# Patient Record
Sex: FEMALE | Race: WHITE | Employment: FULL TIME | URBAN - METROPOLITAN AREA
[De-identification: names, ages, dates, MRNs, and addresses within clinical notes are randomized per-mention and may not be internally consistent; named-entity substitution may affect disease eponyms.]

---

## 2017-01-01 ENCOUNTER — APPOINTMENT (OUTPATIENT)
Dept: CT IMAGING | Age: 57
DRG: 641 | End: 2017-01-01
Attending: HOSPITALIST
Payer: COMMERCIAL

## 2017-01-01 ENCOUNTER — APPOINTMENT (OUTPATIENT)
Dept: CT IMAGING | Age: 57
DRG: 641 | End: 2017-01-01
Attending: INTERNAL MEDICINE
Payer: COMMERCIAL

## 2017-01-01 ENCOUNTER — HOSPITAL ENCOUNTER (INPATIENT)
Age: 57
LOS: 2 days | DRG: 189 | End: 2017-10-08
Attending: EMERGENCY MEDICINE | Admitting: INTERNAL MEDICINE
Payer: COMMERCIAL

## 2017-01-01 ENCOUNTER — HOSPITAL ENCOUNTER (EMERGENCY)
Age: 57
Discharge: HOME | End: 2017-09-10
Attending: EMERGENCY MEDICINE
Payer: COMMERCIAL

## 2017-01-01 ENCOUNTER — APPOINTMENT (OUTPATIENT)
Dept: GENERAL RADIOLOGY | Age: 57
End: 2017-01-01
Attending: EMERGENCY MEDICINE
Payer: COMMERCIAL

## 2017-01-01 ENCOUNTER — APPOINTMENT (OUTPATIENT)
Dept: ULTRASOUND IMAGING | Age: 57
DRG: 189 | End: 2017-01-01
Attending: EMERGENCY MEDICINE
Payer: COMMERCIAL

## 2017-01-01 ENCOUNTER — APPOINTMENT (OUTPATIENT)
Dept: ULTRASOUND IMAGING | Age: 57
DRG: 641 | End: 2017-01-01
Attending: HOSPITALIST
Payer: COMMERCIAL

## 2017-01-01 ENCOUNTER — HOSPITAL ENCOUNTER (INPATIENT)
Age: 57
LOS: 3 days | Discharge: HOME | DRG: 643 | End: 2017-09-18
Attending: EMERGENCY MEDICINE | Admitting: INTERNAL MEDICINE
Payer: COMMERCIAL

## 2017-01-01 ENCOUNTER — HOSPITAL ENCOUNTER (INPATIENT)
Age: 57
LOS: 4 days | Discharge: HOME | DRG: 641 | End: 2017-10-02
Attending: EMERGENCY MEDICINE | Admitting: HOSPITALIST
Payer: COMMERCIAL

## 2017-01-01 ENCOUNTER — APPOINTMENT (OUTPATIENT)
Dept: ULTRASOUND IMAGING | Age: 57
DRG: 641 | End: 2017-01-01
Attending: INTERNAL MEDICINE
Payer: COMMERCIAL

## 2017-01-01 ENCOUNTER — DOCUMENTATION ONLY (OUTPATIENT)
Dept: ONCOLOGY | Age: 57
End: 2017-01-01

## 2017-01-01 ENCOUNTER — APPOINTMENT (OUTPATIENT)
Dept: GENERAL RADIOLOGY | Age: 57
DRG: 189 | End: 2017-01-01
Attending: HOSPITALIST
Payer: COMMERCIAL

## 2017-01-01 ENCOUNTER — APPOINTMENT (OUTPATIENT)
Dept: GENERAL RADIOLOGY | Age: 57
DRG: 643 | End: 2017-01-01
Attending: EMERGENCY MEDICINE
Payer: COMMERCIAL

## 2017-01-01 ENCOUNTER — APPOINTMENT (OUTPATIENT)
Dept: GENERAL RADIOLOGY | Age: 57
DRG: 189 | End: 2017-01-01
Attending: EMERGENCY MEDICINE
Payer: COMMERCIAL

## 2017-01-01 VITALS
HEART RATE: 95 BPM | RESPIRATION RATE: 18 BRPM | BODY MASS INDEX: 32.97 KG/M2 | DIASTOLIC BLOOD PRESSURE: 81 MMHG | HEIGHT: 63 IN | OXYGEN SATURATION: 95 % | TEMPERATURE: 98.2 F | WEIGHT: 186.07 LBS | SYSTOLIC BLOOD PRESSURE: 172 MMHG

## 2017-01-01 VITALS
OXYGEN SATURATION: 97 % | SYSTOLIC BLOOD PRESSURE: 163 MMHG | TEMPERATURE: 98.3 F | WEIGHT: 184.97 LBS | DIASTOLIC BLOOD PRESSURE: 79 MMHG | HEIGHT: 63 IN | BODY MASS INDEX: 32.77 KG/M2 | HEART RATE: 80 BPM | RESPIRATION RATE: 20 BRPM

## 2017-01-01 VITALS
BODY MASS INDEX: 32.6 KG/M2 | WEIGHT: 184 LBS | RESPIRATION RATE: 16 BRPM | HEIGHT: 63 IN | SYSTOLIC BLOOD PRESSURE: 158 MMHG | HEART RATE: 80 BPM | TEMPERATURE: 98.5 F | OXYGEN SATURATION: 92 % | DIASTOLIC BLOOD PRESSURE: 77 MMHG

## 2017-01-01 VITALS
TEMPERATURE: 95.8 F | HEIGHT: 63 IN | SYSTOLIC BLOOD PRESSURE: 57 MMHG | OXYGEN SATURATION: 95 % | WEIGHT: 187.61 LBS | DIASTOLIC BLOOD PRESSURE: 17 MMHG | BODY MASS INDEX: 33.24 KG/M2

## 2017-01-01 DIAGNOSIS — C78.7 LIVER METASTASES (HCC): Primary | ICD-10-CM

## 2017-01-01 DIAGNOSIS — R25.2 CRAMP OF BOTH LOWER EXTREMITIES: ICD-10-CM

## 2017-01-01 DIAGNOSIS — K72.00 ACUTE LIVER FAILURE WITHOUT HEPATIC COMA: ICD-10-CM

## 2017-01-01 DIAGNOSIS — E87.6 HYPOKALEMIA: ICD-10-CM

## 2017-01-01 DIAGNOSIS — C34.91 SMALL CELL CARCINOMA OF RIGHT LUNG (HCC): ICD-10-CM

## 2017-01-01 DIAGNOSIS — E87.6 HYPOKALEMIA: Primary | ICD-10-CM

## 2017-01-01 DIAGNOSIS — K59.00 CONSTIPATION, UNSPECIFIED CONSTIPATION TYPE: ICD-10-CM

## 2017-01-01 DIAGNOSIS — J44.1 ACUTE EXACERBATION OF CHRONIC OBSTRUCTIVE PULMONARY DISEASE (COPD) (HCC): Primary | ICD-10-CM

## 2017-01-01 DIAGNOSIS — R18.0 MALIGNANT ASCITES: ICD-10-CM

## 2017-01-01 DIAGNOSIS — R60.0 PEDAL EDEMA: ICD-10-CM

## 2017-01-01 DIAGNOSIS — C79.9 METASTATIC CANCER (HCC): ICD-10-CM

## 2017-01-01 DIAGNOSIS — E87.1 HYPONATREMIA: Primary | ICD-10-CM

## 2017-01-01 DIAGNOSIS — E87.1 HYPONATREMIA: ICD-10-CM

## 2017-01-01 DIAGNOSIS — J96.21 ACUTE ON CHRONIC RESPIRATORY FAILURE WITH HYPOXIA (HCC): ICD-10-CM

## 2017-01-01 LAB
ACTH PLAS-MCNC: 337.6 PG/ML (ref 7.2–63.3)
AFP-TM SERPL-MCNC: 2.9 NG/ML (ref 0–8.3)
ALBUMIN SERPL-MCNC: 1.8 G/DL (ref 3.5–5)
ALBUMIN SERPL-MCNC: 2.1 G/DL (ref 3.5–5)
ALBUMIN SERPL-MCNC: 2.3 G/DL (ref 3.5–5)
ALBUMIN SERPL-MCNC: 2.4 G/DL (ref 3.5–5)
ALBUMIN SERPL-MCNC: 2.8 G/DL (ref 3.5–5)
ALBUMIN SERPL-MCNC: 2.9 G/DL (ref 3.5–5)
ALBUMIN SERPL-MCNC: 3 G/DL (ref 3.5–5)
ALBUMIN SERPL-MCNC: 3.2 G/DL (ref 3.5–5)
ALBUMIN/GLOB SERPL: 0.5 {RATIO} (ref 1.1–2.2)
ALBUMIN/GLOB SERPL: 0.6 {RATIO} (ref 1.1–2.2)
ALBUMIN/GLOB SERPL: 0.7 {RATIO} (ref 1.1–2.2)
ALBUMIN/GLOB SERPL: 0.7 {RATIO} (ref 1.1–2.2)
ALBUMIN/GLOB SERPL: 1 {RATIO} (ref 1.1–2.2)
ALBUMIN/GLOB SERPL: 1 {RATIO} (ref 1.1–2.2)
ALBUMIN/GLOB SERPL: 1.2 {RATIO} (ref 1.1–2.2)
ALBUMIN/GLOB SERPL: 1.2 {RATIO} (ref 1.1–2.2)
ALDOST 24H UR-MRATE: <10.25 UG/24 HR (ref 0–19)
ALDOST SERPL-MCNC: <1 NG/DL (ref 0–30)
ALDOST UR-MCNC: <2.5 UG/L
ALP SERPL-CCNC: 1292 U/L (ref 45–117)
ALP SERPL-CCNC: 248 U/L (ref 45–117)
ALP SERPL-CCNC: 424 U/L (ref 45–117)
ALP SERPL-CCNC: 437 U/L (ref 45–117)
ALP SERPL-CCNC: 88 U/L (ref 45–117)
ALP SERPL-CCNC: 88 U/L (ref 45–117)
ALP SERPL-CCNC: 98 U/L (ref 45–117)
ALP SERPL-CCNC: 98 U/L (ref 45–117)
ALT SERPL-CCNC: 278 U/L (ref 12–78)
ALT SERPL-CCNC: 292 U/L (ref 12–78)
ALT SERPL-CCNC: 37 U/L (ref 12–78)
ALT SERPL-CCNC: 38 U/L (ref 12–78)
ALT SERPL-CCNC: 52 U/L (ref 12–78)
ALT SERPL-CCNC: 55 U/L (ref 12–78)
ALT SERPL-CCNC: 87 U/L (ref 12–78)
ALT SERPL-CCNC: >3500 U/L (ref 12–78)
AMORPH CRY URNS QL MICRO: ABNORMAL
AMPHET UR QL SCN: NEGATIVE
ANION GAP SERPL CALC-SCNC: 10 MMOL/L (ref 5–15)
ANION GAP SERPL CALC-SCNC: 14 MMOL/L (ref 5–15)
ANION GAP SERPL CALC-SCNC: 23 MMOL/L (ref 5–15)
ANION GAP SERPL CALC-SCNC: 4 MMOL/L (ref 5–15)
ANION GAP SERPL CALC-SCNC: 5 MMOL/L (ref 5–15)
ANION GAP SERPL CALC-SCNC: 6 MMOL/L (ref 5–15)
ANION GAP SERPL CALC-SCNC: 7 MMOL/L (ref 5–15)
ANION GAP SERPL CALC-SCNC: 8 MMOL/L (ref 5–15)
ANION GAP SERPL CALC-SCNC: 9 MMOL/L (ref 5–15)
APPEARANCE UR: ABNORMAL
APPEARANCE UR: CLEAR
APPEARANCE UR: CLEAR
APTT PPP: 23.3 SEC (ref 22.1–32.5)
ARTERIAL PATENCY WRIST A: YES
AST SERPL-CCNC: 175 U/L (ref 15–37)
AST SERPL-CCNC: 182 U/L (ref 15–37)
AST SERPL-CCNC: 195 U/L (ref 15–37)
AST SERPL-CCNC: 28 U/L (ref 15–37)
AST SERPL-CCNC: 31 U/L (ref 15–37)
AST SERPL-CCNC: 37 U/L (ref 15–37)
AST SERPL-CCNC: 46 U/L (ref 15–37)
AST SERPL-CCNC: >2000 U/L (ref 15–37)
ATRIAL RATE: 163 BPM
ATRIAL RATE: 62 BPM
ATRIAL RATE: 88 BPM
BACTERIA SPEC CULT: NORMAL
BACTERIA SPEC CULT: NORMAL
BACTERIA URNS QL MICRO: ABNORMAL /HPF
BACTERIA URNS QL MICRO: NEGATIVE /HPF
BACTERIA URNS QL MICRO: NEGATIVE /HPF
BARBITURATES UR QL SCN: NEGATIVE
BASE DEFICIT BLDA-SCNC: 19.6 MMOL/L
BASE DEFICIT BLDA-SCNC: 2 MMOL/L
BASE EXCESS BLDA CALC-SCNC: 19.9 MMOL/L
BASOPHILS # BLD: 0 K/UL
BASOPHILS # BLD: 0 K/UL (ref 0–0.1)
BASOPHILS NFR BLD: 0 %
BASOPHILS NFR BLD: 0 % (ref 0–1)
BDY SITE: ABNORMAL
BENZODIAZ UR QL: NEGATIVE
BILIRUB DIRECT SERPL-MCNC: 2.4 MG/DL (ref 0–0.2)
BILIRUB SERPL-MCNC: 0.6 MG/DL (ref 0.2–1)
BILIRUB SERPL-MCNC: 0.9 MG/DL (ref 0.2–1)
BILIRUB SERPL-MCNC: 1 MG/DL (ref 0.2–1)
BILIRUB SERPL-MCNC: 3.2 MG/DL (ref 0.2–1)
BILIRUB SERPL-MCNC: 3.5 MG/DL (ref 0.2–1)
BILIRUB SERPL-MCNC: 4.3 MG/DL (ref 0.2–1)
BILIRUB UR QL: NEGATIVE
BNP SERPL-MCNC: 1014 PG/ML (ref 0–125)
BREATHS.SPONTANEOUS ON VENT: 20
BREATHS.SPONTANEOUS ON VENT: 28
BUN SERPL-MCNC: 10 MG/DL (ref 6–20)
BUN SERPL-MCNC: 11 MG/DL (ref 6–20)
BUN SERPL-MCNC: 12 MG/DL (ref 6–20)
BUN SERPL-MCNC: 12 MG/DL (ref 6–20)
BUN SERPL-MCNC: 14 MG/DL (ref 6–20)
BUN SERPL-MCNC: 18 MG/DL (ref 6–20)
BUN SERPL-MCNC: 20 MG/DL (ref 6–20)
BUN SERPL-MCNC: 36 MG/DL (ref 6–20)
BUN SERPL-MCNC: 7 MG/DL (ref 6–20)
BUN SERPL-MCNC: 8 MG/DL (ref 6–20)
BUN SERPL-MCNC: 8 MG/DL (ref 6–20)
BUN SERPL-MCNC: 9 MG/DL (ref 6–20)
BUN/CREAT SERPL: 13 (ref 12–20)
BUN/CREAT SERPL: 15 (ref 12–20)
BUN/CREAT SERPL: 15 (ref 12–20)
BUN/CREAT SERPL: 17 (ref 12–20)
BUN/CREAT SERPL: 18 (ref 12–20)
BUN/CREAT SERPL: 18 (ref 12–20)
BUN/CREAT SERPL: 19 (ref 12–20)
BUN/CREAT SERPL: 20 (ref 12–20)
BUN/CREAT SERPL: 21 (ref 12–20)
BUN/CREAT SERPL: 22 (ref 12–20)
BUN/CREAT SERPL: 23 (ref 12–20)
BUN/CREAT SERPL: 24 (ref 12–20)
BUN/CREAT SERPL: 26 (ref 12–20)
BUN/CREAT SERPL: 29 (ref 12–20)
CALCIUM SERPL-MCNC: 7.7 MG/DL (ref 8.5–10.1)
CALCIUM SERPL-MCNC: 7.7 MG/DL (ref 8.5–10.1)
CALCIUM SERPL-MCNC: 7.8 MG/DL (ref 8.5–10.1)
CALCIUM SERPL-MCNC: 7.9 MG/DL (ref 8.5–10.1)
CALCIUM SERPL-MCNC: 8 MG/DL (ref 8.5–10.1)
CALCIUM SERPL-MCNC: 8.1 MG/DL (ref 8.5–10.1)
CALCIUM SERPL-MCNC: 8.1 MG/DL (ref 8.5–10.1)
CALCIUM SERPL-MCNC: 8.2 MG/DL (ref 8.5–10.1)
CALCIUM SERPL-MCNC: 8.3 MG/DL (ref 8.5–10.1)
CALCIUM SERPL-MCNC: 8.8 MG/DL (ref 8.5–10.1)
CALCIUM SERPL-MCNC: 8.8 MG/DL (ref 8.5–10.1)
CALCIUM SERPL-MCNC: 8.9 MG/DL (ref 8.5–10.1)
CALCULATED P AXIS, ECG09: -52 DEGREES
CALCULATED P AXIS, ECG09: 69 DEGREES
CALCULATED R AXIS, ECG10: -15 DEGREES
CALCULATED R AXIS, ECG10: -74 DEGREES
CALCULATED R AXIS, ECG10: 5 DEGREES
CALCULATED T AXIS, ECG11: -131 DEGREES
CALCULATED T AXIS, ECG11: 43 DEGREES
CALCULATED T AXIS, ECG11: 68 DEGREES
CANCER AG19-9 SERPL-ACNC: 117 U/ML (ref 0–35)
CANNABINOIDS UR QL SCN: NEGATIVE
CC UR VC: NORMAL
CC UR VC: NORMAL
CEA SERPL-MCNC: 2.1 NG/ML
CHLORIDE SERPL-SCNC: 100 MMOL/L (ref 97–108)
CHLORIDE SERPL-SCNC: 101 MMOL/L (ref 97–108)
CHLORIDE SERPL-SCNC: 102 MMOL/L (ref 97–108)
CHLORIDE SERPL-SCNC: 77 MMOL/L (ref 97–108)
CHLORIDE SERPL-SCNC: 77 MMOL/L (ref 97–108)
CHLORIDE SERPL-SCNC: 78 MMOL/L (ref 97–108)
CHLORIDE SERPL-SCNC: 81 MMOL/L (ref 97–108)
CHLORIDE SERPL-SCNC: 83 MMOL/L (ref 97–108)
CHLORIDE SERPL-SCNC: 87 MMOL/L (ref 97–108)
CHLORIDE SERPL-SCNC: 89 MMOL/L (ref 97–108)
CHLORIDE SERPL-SCNC: 92 MMOL/L (ref 97–108)
CHLORIDE SERPL-SCNC: 93 MMOL/L (ref 97–108)
CHLORIDE SERPL-SCNC: 94 MMOL/L (ref 97–108)
CHLORIDE SERPL-SCNC: 95 MMOL/L (ref 97–108)
CHLORIDE SERPL-SCNC: 95 MMOL/L (ref 97–108)
CHLORIDE SERPL-SCNC: 96 MMOL/L (ref 97–108)
CHLORIDE SERPL-SCNC: 97 MMOL/L (ref 97–108)
CHLORIDE SERPL-SCNC: 99 MMOL/L (ref 97–108)
CK MB CFR SERPL CALC: 0.4 % (ref 0–2.5)
CK MB CFR SERPL CALC: 0.7 % (ref 0–2.5)
CK MB SERPL-MCNC: 17.3 NG/ML (ref 5–25)
CK MB SERPL-MCNC: 2.2 NG/ML (ref 5–25)
CK SERPL-CCNC: 294 U/L (ref 26–192)
CK SERPL-CCNC: 322 U/L (ref 26–192)
CK SERPL-CCNC: 4042 U/L (ref 26–192)
CO2 SERPL-SCNC: 11 MMOL/L (ref 21–32)
CO2 SERPL-SCNC: 23 MMOL/L (ref 21–32)
CO2 SERPL-SCNC: 28 MMOL/L (ref 21–32)
CO2 SERPL-SCNC: 28 MMOL/L (ref 21–32)
CO2 SERPL-SCNC: 29 MMOL/L (ref 21–32)
CO2 SERPL-SCNC: 31 MMOL/L (ref 21–32)
CO2 SERPL-SCNC: 33 MMOL/L (ref 21–32)
CO2 SERPL-SCNC: 34 MMOL/L (ref 21–32)
CO2 SERPL-SCNC: 34 MMOL/L (ref 21–32)
CO2 SERPL-SCNC: 35 MMOL/L (ref 21–32)
CO2 SERPL-SCNC: 36 MMOL/L (ref 21–32)
CO2 SERPL-SCNC: 36 MMOL/L (ref 21–32)
CO2 SERPL-SCNC: 37 MMOL/L (ref 21–32)
CO2 SERPL-SCNC: 37 MMOL/L (ref 21–32)
CO2 SERPL-SCNC: 38 MMOL/L (ref 21–32)
CO2 SERPL-SCNC: 40 MMOL/L (ref 21–32)
CO2 SERPL-SCNC: 43 MMOL/L (ref 21–32)
CO2 SERPL-SCNC: 45 MMOL/L (ref 21–32)
COCAINE UR QL SCN: NEGATIVE
COLLECT DURATION TIME UR: 24 HR
COLOR UR: ABNORMAL
CORTIS AM PEAK SERPL-MCNC: 104.6 UG/DL (ref 4.3–22.4)
CREAT SERPL-MCNC: 0.33 MG/DL (ref 0.55–1.02)
CREAT SERPL-MCNC: 0.38 MG/DL (ref 0.55–1.02)
CREAT SERPL-MCNC: 0.41 MG/DL (ref 0.55–1.02)
CREAT SERPL-MCNC: 0.43 MG/DL (ref 0.55–1.02)
CREAT SERPL-MCNC: 0.43 MG/DL (ref 0.55–1.02)
CREAT SERPL-MCNC: 0.45 MG/DL (ref 0.55–1.02)
CREAT SERPL-MCNC: 0.45 MG/DL (ref 0.55–1.02)
CREAT SERPL-MCNC: 0.51 MG/DL (ref 0.55–1.02)
CREAT SERPL-MCNC: 0.51 MG/DL (ref 0.55–1.02)
CREAT SERPL-MCNC: 0.52 MG/DL (ref 0.55–1.02)
CREAT SERPL-MCNC: 0.52 MG/DL (ref 0.55–1.02)
CREAT SERPL-MCNC: 0.54 MG/DL (ref 0.55–1.02)
CREAT SERPL-MCNC: 0.61 MG/DL (ref 0.55–1.02)
CREAT SERPL-MCNC: 0.69 MG/DL (ref 0.55–1.02)
CREAT SERPL-MCNC: 0.72 MG/DL (ref 0.55–1.02)
CREAT SERPL-MCNC: 0.88 MG/DL (ref 0.55–1.02)
CREAT SERPL-MCNC: 0.96 MG/DL (ref 0.55–1.02)
CREAT SERPL-MCNC: 2.42 MG/DL (ref 0.55–1.02)
DIAGNOSIS, 93000: NORMAL
DIFFERENTIAL METHOD BLD: ABNORMAL
DRUG SCRN COMMENT,DRGCM: NORMAL
EOSINOPHIL # BLD: 0 K/UL
EOSINOPHIL # BLD: 0 K/UL (ref 0–0.4)
EOSINOPHIL NFR BLD: 0 %
EOSINOPHIL NFR BLD: 0 % (ref 0–7)
EPAP/CPAP/PEEP, PAPEEP: 6
EPITH CASTS URNS QL MICRO: ABNORMAL /LPF
ERYTHROCYTE [DISTWIDTH] IN BLOOD BY AUTOMATED COUNT: 12.7 % (ref 11.5–14.5)
ERYTHROCYTE [DISTWIDTH] IN BLOOD BY AUTOMATED COUNT: 12.8 % (ref 11.5–14.5)
ERYTHROCYTE [DISTWIDTH] IN BLOOD BY AUTOMATED COUNT: 13.1 % (ref 11.5–14.5)
ERYTHROCYTE [DISTWIDTH] IN BLOOD BY AUTOMATED COUNT: 13.3 % (ref 11.5–14.5)
ERYTHROCYTE [DISTWIDTH] IN BLOOD BY AUTOMATED COUNT: 13.4 % (ref 11.5–14.5)
ERYTHROCYTE [DISTWIDTH] IN BLOOD BY AUTOMATED COUNT: 13.6 % (ref 11.5–14.5)
ERYTHROCYTE [DISTWIDTH] IN BLOOD BY AUTOMATED COUNT: 13.7 % (ref 11.5–14.5)
ERYTHROCYTE [DISTWIDTH] IN BLOOD BY AUTOMATED COUNT: 14.6 % (ref 11.5–14.5)
ERYTHROCYTE [DISTWIDTH] IN BLOOD BY AUTOMATED COUNT: 14.6 % (ref 11.5–14.5)
ERYTHROCYTE [DISTWIDTH] IN BLOOD BY AUTOMATED COUNT: 15.1 % (ref 11.5–14.5)
EST. AVERAGE GLUCOSE BLD GHB EST-MCNC: 143 MG/DL
FIO2 ON VENT: 100 %
FIO2 ON VENT: 100 %
GAS FLOW.O2 SETTING OXYMISER: 4 L/MIN
GLOBULIN SER CALC-MCNC: 2.6 G/DL (ref 2–4)
GLOBULIN SER CALC-MCNC: 2.7 G/DL (ref 2–4)
GLOBULIN SER CALC-MCNC: 2.8 G/DL (ref 2–4)
GLOBULIN SER CALC-MCNC: 2.9 G/DL (ref 2–4)
GLOBULIN SER CALC-MCNC: 3.1 G/DL (ref 2–4)
GLOBULIN SER CALC-MCNC: 3.3 G/DL (ref 2–4)
GLOBULIN SER CALC-MCNC: 3.4 G/DL (ref 2–4)
GLOBULIN SER CALC-MCNC: 3.5 G/DL (ref 2–4)
GLUCOSE BLD STRIP.AUTO-MCNC: 109 MG/DL (ref 65–100)
GLUCOSE BLD STRIP.AUTO-MCNC: 118 MG/DL (ref 65–100)
GLUCOSE BLD STRIP.AUTO-MCNC: 131 MG/DL (ref 65–100)
GLUCOSE BLD STRIP.AUTO-MCNC: 139 MG/DL (ref 65–100)
GLUCOSE BLD STRIP.AUTO-MCNC: 157 MG/DL (ref 65–100)
GLUCOSE BLD STRIP.AUTO-MCNC: 161 MG/DL (ref 65–100)
GLUCOSE BLD STRIP.AUTO-MCNC: 166 MG/DL (ref 65–100)
GLUCOSE BLD STRIP.AUTO-MCNC: 167 MG/DL (ref 65–100)
GLUCOSE BLD STRIP.AUTO-MCNC: 168 MG/DL (ref 65–100)
GLUCOSE BLD STRIP.AUTO-MCNC: 174 MG/DL (ref 65–100)
GLUCOSE BLD STRIP.AUTO-MCNC: 176 MG/DL (ref 65–100)
GLUCOSE BLD STRIP.AUTO-MCNC: 181 MG/DL (ref 65–100)
GLUCOSE BLD STRIP.AUTO-MCNC: 197 MG/DL (ref 65–100)
GLUCOSE BLD STRIP.AUTO-MCNC: 198 MG/DL (ref 65–100)
GLUCOSE BLD STRIP.AUTO-MCNC: 203 MG/DL (ref 65–100)
GLUCOSE BLD STRIP.AUTO-MCNC: 215 MG/DL (ref 65–100)
GLUCOSE BLD STRIP.AUTO-MCNC: 228 MG/DL (ref 65–100)
GLUCOSE BLD STRIP.AUTO-MCNC: 229 MG/DL (ref 65–100)
GLUCOSE BLD STRIP.AUTO-MCNC: 237 MG/DL (ref 65–100)
GLUCOSE BLD STRIP.AUTO-MCNC: 250 MG/DL (ref 65–100)
GLUCOSE BLD STRIP.AUTO-MCNC: 252 MG/DL (ref 65–100)
GLUCOSE BLD STRIP.AUTO-MCNC: 268 MG/DL (ref 65–100)
GLUCOSE BLD STRIP.AUTO-MCNC: 306 MG/DL (ref 65–100)
GLUCOSE BLD STRIP.AUTO-MCNC: 316 MG/DL (ref 65–100)
GLUCOSE BLD STRIP.AUTO-MCNC: 85 MG/DL (ref 65–100)
GLUCOSE BLD STRIP.AUTO-MCNC: 91 MG/DL (ref 65–100)
GLUCOSE BLD STRIP.AUTO-MCNC: 92 MG/DL (ref 65–100)
GLUCOSE BLD STRIP.AUTO-MCNC: 92 MG/DL (ref 65–100)
GLUCOSE BLD STRIP.AUTO-MCNC: 94 MG/DL (ref 65–100)
GLUCOSE BLD STRIP.AUTO-MCNC: 97 MG/DL (ref 65–100)
GLUCOSE SERPL-MCNC: 112 MG/DL (ref 65–100)
GLUCOSE SERPL-MCNC: 133 MG/DL (ref 65–100)
GLUCOSE SERPL-MCNC: 141 MG/DL (ref 65–100)
GLUCOSE SERPL-MCNC: 150 MG/DL (ref 65–100)
GLUCOSE SERPL-MCNC: 165 MG/DL (ref 65–100)
GLUCOSE SERPL-MCNC: 178 MG/DL (ref 65–100)
GLUCOSE SERPL-MCNC: 182 MG/DL (ref 65–100)
GLUCOSE SERPL-MCNC: 198 MG/DL (ref 65–100)
GLUCOSE SERPL-MCNC: 198 MG/DL (ref 65–100)
GLUCOSE SERPL-MCNC: 211 MG/DL (ref 65–100)
GLUCOSE SERPL-MCNC: 223 MG/DL (ref 65–100)
GLUCOSE SERPL-MCNC: 224 MG/DL (ref 65–100)
GLUCOSE SERPL-MCNC: 339 MG/DL (ref 65–100)
GLUCOSE SERPL-MCNC: 342 MG/DL (ref 65–100)
GLUCOSE SERPL-MCNC: 75 MG/DL (ref 65–100)
GLUCOSE SERPL-MCNC: 86 MG/DL (ref 65–100)
GLUCOSE UR STRIP.AUTO-MCNC: 500 MG/DL
HBA1C MFR BLD: 6.6 % (ref 4.2–6.3)
HCO3 BLDA-SCNC: 11 MMOL/L (ref 22–26)
HCO3 BLDA-SCNC: 19 MMOL/L (ref 22–26)
HCO3 BLDA-SCNC: 43 MMOL/L (ref 22–26)
HCT VFR BLD AUTO: 29.3 % (ref 35–47)
HCT VFR BLD AUTO: 31.3 % (ref 35–47)
HCT VFR BLD AUTO: 32.3 % (ref 35–47)
HCT VFR BLD AUTO: 33.5 % (ref 35–47)
HCT VFR BLD AUTO: 33.8 % (ref 35–47)
HCT VFR BLD AUTO: 33.9 % (ref 35–47)
HCT VFR BLD AUTO: 35.6 % (ref 35–47)
HCT VFR BLD AUTO: 36.2 % (ref 35–47)
HCT VFR BLD AUTO: 38.2 % (ref 35–47)
HCT VFR BLD AUTO: 38.5 % (ref 35–47)
HGB BLD-MCNC: 10.8 G/DL (ref 11.5–16)
HGB BLD-MCNC: 11.2 G/DL (ref 11.5–16)
HGB BLD-MCNC: 12.1 G/DL (ref 11.5–16)
HGB BLD-MCNC: 12.1 G/DL (ref 11.5–16)
HGB BLD-MCNC: 12.2 G/DL (ref 11.5–16)
HGB BLD-MCNC: 12.7 G/DL (ref 11.5–16)
HGB BLD-MCNC: 13.2 G/DL (ref 11.5–16)
HGB BLD-MCNC: 13.7 G/DL (ref 11.5–16)
HGB BLD-MCNC: 14 G/DL (ref 11.5–16)
HGB BLD-MCNC: 9.4 G/DL (ref 11.5–16)
HGB UR QL STRIP: NEGATIVE
HYALINE CASTS URNS QL MICRO: ABNORMAL /LPF (ref 0–5)
INR PPP: 1.2 (ref 0.9–1.1)
INR PPP: 1.2 (ref 0.9–1.1)
IPAP/PIP, IPAPIP: 8
KETONES UR QL STRIP.AUTO: NEGATIVE MG/DL
LACTATE SERPL-SCNC: 16.7 MMOL/L (ref 0.4–2)
LEUKOCYTE ESTERASE UR QL STRIP.AUTO: ABNORMAL
LEUKOCYTE ESTERASE UR QL STRIP.AUTO: NEGATIVE
LEUKOCYTE ESTERASE UR QL STRIP.AUTO: NEGATIVE
LIPASE SERPL-CCNC: 1392 U/L (ref 73–393)
LIPASE SERPL-CCNC: 397 U/L (ref 73–393)
LIPASE SERPL-CCNC: 461 U/L (ref 73–393)
LIPASE SERPL-CCNC: 778 U/L (ref 73–393)
LYMPHOCYTES # BLD: 1 K/UL (ref 0.8–3.5)
LYMPHOCYTES # BLD: 2.1 K/UL
LYMPHOCYTES # BLD: 2.5 K/UL
LYMPHOCYTES # BLD: 2.6 K/UL
LYMPHOCYTES NFR BLD: 18 %
LYMPHOCYTES NFR BLD: 22 %
LYMPHOCYTES NFR BLD: 24 %
LYMPHOCYTES NFR BLD: 6 % (ref 12–49)
MAGNESIUM SERPL-MCNC: 1.8 MG/DL (ref 1.6–2.4)
MAGNESIUM SERPL-MCNC: 1.8 MG/DL (ref 1.6–2.4)
MAGNESIUM SERPL-MCNC: 1.9 MG/DL (ref 1.6–2.4)
MAGNESIUM SERPL-MCNC: 1.9 MG/DL (ref 1.6–2.4)
MAGNESIUM SERPL-MCNC: 2 MG/DL (ref 1.6–2.4)
MAGNESIUM SERPL-MCNC: 2 MG/DL (ref 1.6–2.4)
MAGNESIUM SERPL-MCNC: 2.1 MG/DL (ref 1.6–2.4)
MAGNESIUM SERPL-MCNC: 2.1 MG/DL (ref 1.6–2.4)
MAGNESIUM SERPL-MCNC: 2.3 MG/DL (ref 1.6–2.4)
MAGNESIUM SERPL-MCNC: 2.4 MG/DL (ref 1.6–2.4)
MAGNESIUM SERPL-MCNC: 3.2 MG/DL (ref 1.6–2.4)
MCH RBC QN AUTO: 29.3 PG (ref 26–34)
MCH RBC QN AUTO: 29.4 PG (ref 26–34)
MCH RBC QN AUTO: 29.6 PG (ref 26–34)
MCH RBC QN AUTO: 29.7 PG (ref 26–34)
MCH RBC QN AUTO: 29.8 PG (ref 26–34)
MCH RBC QN AUTO: 29.8 PG (ref 26–34)
MCH RBC QN AUTO: 29.9 PG (ref 26–34)
MCH RBC QN AUTO: 31 PG (ref 26–34)
MCHC RBC AUTO-ENTMCNC: 32.1 G/DL (ref 30–36.5)
MCHC RBC AUTO-ENTMCNC: 34.5 G/DL (ref 30–36.5)
MCHC RBC AUTO-ENTMCNC: 34.7 G/DL (ref 30–36.5)
MCHC RBC AUTO-ENTMCNC: 35.6 G/DL (ref 30–36.5)
MCHC RBC AUTO-ENTMCNC: 35.7 G/DL (ref 30–36.5)
MCHC RBC AUTO-ENTMCNC: 35.7 G/DL (ref 30–36.5)
MCHC RBC AUTO-ENTMCNC: 35.8 G/DL (ref 30–36.5)
MCHC RBC AUTO-ENTMCNC: 36.4 G/DL (ref 30–36.5)
MCHC RBC AUTO-ENTMCNC: 36.5 G/DL (ref 30–36.5)
MCHC RBC AUTO-ENTMCNC: 36.6 G/DL (ref 30–36.5)
MCV RBC AUTO: 81.7 FL (ref 80–99)
MCV RBC AUTO: 82.1 FL (ref 80–99)
MCV RBC AUTO: 82.5 FL (ref 80–99)
MCV RBC AUTO: 83.5 FL (ref 80–99)
MCV RBC AUTO: 83.8 FL (ref 80–99)
MCV RBC AUTO: 83.9 FL (ref 80–99)
MCV RBC AUTO: 84.5 FL (ref 80–99)
MCV RBC AUTO: 84.8 FL (ref 80–99)
MCV RBC AUTO: 85.4 FL (ref 80–99)
MCV RBC AUTO: 92.7 FL (ref 80–99)
METAMYELOCYTES NFR BLD MANUAL: 1 %
METAMYELOCYTES NFR BLD MANUAL: 2 %
METAMYELOCYTES NFR BLD MANUAL: 3 %
METHADONE UR QL: NEGATIVE
MONOCYTES # BLD: 0 K/UL
MONOCYTES # BLD: 0.4 K/UL
MONOCYTES # BLD: 0.4 K/UL
MONOCYTES # BLD: 1 K/UL (ref 0–1)
MONOCYTES NFR BLD: 0 %
MONOCYTES NFR BLD: 3 %
MONOCYTES NFR BLD: 4 %
MONOCYTES NFR BLD: 6 % (ref 5–13)
MYELOCYTES NFR BLD MANUAL: 2 %
MYELOCYTES NFR BLD MANUAL: 2 %
MYELOCYTES NFR BLD MANUAL: 3 %
NEUTS BAND NFR BLD MANUAL: 12 %
NEUTS BAND NFR BLD MANUAL: 12 %
NEUTS BAND NFR BLD MANUAL: 19 %
NEUTS SEG # BLD: 14.9 K/UL (ref 1.8–8)
NEUTS SEG # BLD: 6.9 K/UL
NEUTS SEG # BLD: 8.8 K/UL
NEUTS SEG # BLD: 8.9 K/UL
NEUTS SEG NFR BLD: 48 %
NEUTS SEG NFR BLD: 62 %
NEUTS SEG NFR BLD: 63 %
NEUTS SEG NFR BLD: 88 % (ref 32–75)
NITRITE UR QL STRIP.AUTO: NEGATIVE
NRBC # BLD: 0.57 K/UL (ref 0–0.01)
NRBC # BLD: 0.58 K/UL (ref 0–0.01)
NRBC BLD-RTO: 5.5 PER 100 WBC
NRBC BLD-RTO: 9.9 PER 100 WBC
OPIATES UR QL: NEGATIVE
OSMOLALITY SERPL: 270 MOSM/KG H2O
OSMOLALITY UR: 327 MOSM/KG H2O
OSMOLALITY UR: 335 MOSM/KG H2O
P-R INTERVAL, ECG05: 124 MS
P-R INTERVAL, ECG05: 156 MS
PCO2 BLDA: 23 MMHG (ref 35–45)
PCO2 BLDA: 40 MMHG (ref 35–45)
PCO2 BLDA: 43 MMHG (ref 35–45)
PCP UR QL: NEGATIVE
PH BLDA: 7.02 [PH] (ref 7.35–7.45)
PH BLDA: 7.53 [PH] (ref 7.35–7.45)
PH BLDA: 7.65 [PH] (ref 7.35–7.45)
PH UR STRIP: 7 [PH] (ref 5–8)
PH UR STRIP: 7 [PH] (ref 5–8)
PH UR STRIP: 7.5 [PH] (ref 5–8)
PHOSPHATE SERPL-MCNC: 2 MG/DL (ref 2.6–4.7)
PHOSPHATE SERPL-MCNC: 2.3 MG/DL (ref 2.6–4.7)
PHOSPHATE SERPL-MCNC: 2.5 MG/DL (ref 2.6–4.7)
PHOSPHATE SERPL-MCNC: 2.6 MG/DL (ref 2.6–4.7)
PHOSPHATE SERPL-MCNC: 2.6 MG/DL (ref 2.6–4.7)
PHOSPHATE SERPL-MCNC: 2.7 MG/DL (ref 2.6–4.7)
PHOSPHATE SERPL-MCNC: 4.9 MG/DL (ref 2.6–4.7)
PHOSPHATE SERPL-MCNC: >13.5 MG/DL (ref 2.6–4.7)
PLATELET # BLD AUTO: 169 K/UL (ref 150–400)
PLATELET # BLD AUTO: 185 K/UL (ref 150–400)
PLATELET # BLD AUTO: 192 K/UL (ref 150–400)
PLATELET # BLD AUTO: 220 K/UL (ref 150–400)
PLATELET # BLD AUTO: 26 K/UL (ref 150–400)
PLATELET # BLD AUTO: 31 K/UL (ref 150–400)
PLATELET # BLD AUTO: 62 K/UL (ref 150–400)
PLATELET # BLD AUTO: 69 K/UL (ref 150–400)
PLATELET # BLD AUTO: 71 K/UL (ref 150–400)
PLATELET # BLD AUTO: 74 K/UL (ref 150–400)
PO2 BLDA: 115 MMHG (ref 80–100)
PO2 BLDA: 48 MMHG (ref 80–100)
PO2 BLDA: 58 MMHG (ref 80–100)
POTASSIUM SERPL-SCNC: 1.9 MMOL/L (ref 3.5–5.1)
POTASSIUM SERPL-SCNC: 2.1 MMOL/L (ref 3.5–5.1)
POTASSIUM SERPL-SCNC: 2.2 MMOL/L (ref 3.5–5.1)
POTASSIUM SERPL-SCNC: 2.4 MMOL/L (ref 3.5–5.1)
POTASSIUM SERPL-SCNC: 2.5 MMOL/L (ref 3.5–5.1)
POTASSIUM SERPL-SCNC: 2.7 MMOL/L (ref 3.5–5.1)
POTASSIUM SERPL-SCNC: 2.8 MMOL/L (ref 3.5–5.1)
POTASSIUM SERPL-SCNC: 2.9 MMOL/L (ref 3.5–5.1)
POTASSIUM SERPL-SCNC: 3 MMOL/L (ref 3.5–5.1)
POTASSIUM SERPL-SCNC: 3.1 MMOL/L (ref 3.5–5.1)
POTASSIUM SERPL-SCNC: 3.4 MMOL/L (ref 3.5–5.1)
POTASSIUM SERPL-SCNC: 3.6 MMOL/L (ref 3.5–5.1)
POTASSIUM SERPL-SCNC: 3.8 MMOL/L (ref 3.5–5.1)
POTASSIUM SERPL-SCNC: 4.3 MMOL/L (ref 3.5–5.1)
POTASSIUM SERPL-SCNC: 5.4 MMOL/L (ref 3.5–5.1)
POTASSIUM SERPL-SCNC: 8.5 MMOL/L (ref 3.5–5.1)
POTASSIUM SERPL-SCNC: <1.8 MMOL/L (ref 3.5–5.1)
POTASSIUM SERPL-SCNC: <1.8 MMOL/L (ref 3.5–5.1)
POTASSIUM UR-SCNC: 17 MMOL/L
POTASSIUM UR-SCNC: 18 MMOL/L
PROT SERPL-MCNC: 5.1 G/DL (ref 6.4–8.2)
PROT SERPL-MCNC: 5.4 G/DL (ref 6.4–8.2)
PROT SERPL-MCNC: 5.5 G/DL (ref 6.4–8.2)
PROT SERPL-MCNC: 5.6 G/DL (ref 6.4–8.2)
PROT SERPL-MCNC: 5.7 G/DL (ref 6.4–8.2)
PROT SERPL-MCNC: 5.7 G/DL (ref 6.4–8.2)
PROT SERPL-MCNC: 5.9 G/DL (ref 6.4–8.2)
PROT SERPL-MCNC: 5.9 G/DL (ref 6.4–8.2)
PROT UR STRIP-MCNC: 100 MG/DL
PROT UR STRIP-MCNC: ABNORMAL MG/DL
PROT UR STRIP-MCNC: ABNORMAL MG/DL
PROTHROMBIN TIME: 11.7 SEC (ref 9–11.1)
PROTHROMBIN TIME: 12.1 SEC (ref 9–11.1)
Q-T INTERVAL, ECG07: 266 MS
Q-T INTERVAL, ECG07: 358 MS
Q-T INTERVAL, ECG07: 406 MS
QRS DURATION, ECG06: 146 MS
QRS DURATION, ECG06: 80 MS
QRS DURATION, ECG06: 94 MS
QTC CALCULATION (BEZET), ECG08: 412 MS
QTC CALCULATION (BEZET), ECG08: 433 MS
QTC CALCULATION (BEZET), ECG08: 465 MS
RBC # BLD AUTO: 3.16 M/UL (ref 3.8–5.2)
RBC # BLD AUTO: 3.69 M/UL (ref 3.8–5.2)
RBC # BLD AUTO: 3.78 M/UL (ref 3.8–5.2)
RBC # BLD AUTO: 4.05 M/UL (ref 3.8–5.2)
RBC # BLD AUTO: 4.1 M/UL (ref 3.8–5.2)
RBC # BLD AUTO: 4.11 M/UL (ref 3.8–5.2)
RBC # BLD AUTO: 4.25 M/UL (ref 3.8–5.2)
RBC # BLD AUTO: 4.41 M/UL (ref 3.8–5.2)
RBC # BLD AUTO: 4.52 M/UL (ref 3.8–5.2)
RBC # BLD AUTO: 4.59 M/UL (ref 3.8–5.2)
RBC #/AREA URNS HPF: ABNORMAL /HPF (ref 0–5)
RBC MORPH BLD: ABNORMAL
RENIN PLAS-CCNC: <0.167 NG/ML/HR (ref 0.17–5.38)
SAO2 % BLD: 89 % (ref 92–97)
SAO2 % BLD: 95 % (ref 92–97)
SAO2 % BLD: 96 % (ref 92–97)
SAO2% DEVICE SAO2% SENSOR NAME: ABNORMAL
SERVICE CMNT-IMP: ABNORMAL
SERVICE CMNT-IMP: NORMAL
SODIUM SERPL-SCNC: 126 MMOL/L (ref 136–145)
SODIUM SERPL-SCNC: 128 MMOL/L (ref 136–145)
SODIUM SERPL-SCNC: 130 MMOL/L (ref 136–145)
SODIUM SERPL-SCNC: 132 MMOL/L (ref 136–145)
SODIUM SERPL-SCNC: 133 MMOL/L (ref 136–145)
SODIUM SERPL-SCNC: 133 MMOL/L (ref 136–145)
SODIUM SERPL-SCNC: 134 MMOL/L (ref 136–145)
SODIUM SERPL-SCNC: 135 MMOL/L (ref 136–145)
SODIUM SERPL-SCNC: 136 MMOL/L (ref 136–145)
SODIUM SERPL-SCNC: 138 MMOL/L (ref 136–145)
SODIUM SERPL-SCNC: 138 MMOL/L (ref 136–145)
SODIUM SERPL-SCNC: 140 MMOL/L (ref 136–145)
SODIUM UR-SCNC: 21 MMOL/L
SODIUM UR-SCNC: 28 MMOL/L
SP GR UR REFRACTOMETRY: 1.01 (ref 1–1.03)
SP GR UR REFRACTOMETRY: 1.01 (ref 1–1.03)
SP GR UR REFRACTOMETRY: 1.02 (ref 1–1.03)
SPECIMEN SITE: ABNORMAL
SPECIMEN SOURCE: ABNORMAL
SPECIMEN VOL ?TM UR: 4100 ML
T4 FREE SERPL-MCNC: 0.5 NG/DL (ref 0.8–1.5)
THERAPEUTIC RANGE,PTTT: NORMAL SECS (ref 58–77)
TROPONIN I SERPL-MCNC: 0.15 NG/ML
TROPONIN I SERPL-MCNC: <0.04 NG/ML
TSH SERPL DL<=0.05 MIU/L-ACNC: 0.21 UIU/ML (ref 0.36–3.74)
TSH SERPL DL<=0.05 MIU/L-ACNC: 2.9 UIU/ML (ref 0.36–3.74)
UA: UC IF INDICATED,UAUC: ABNORMAL
UA: UC IF INDICATED,UAUC: ABNORMAL
UROBILINOGEN UR QL STRIP.AUTO: 1 EU/DL (ref 0.2–1)
VENTILATION MODE VENT: ABNORMAL
VENTRICULAR RATE, ECG03: 184 BPM
VENTRICULAR RATE, ECG03: 62 BPM
VENTRICULAR RATE, ECG03: 88 BPM
WBC # BLD AUTO: 10.2 K/UL (ref 3.6–11)
WBC # BLD AUTO: 10.3 K/UL (ref 3.6–11)
WBC # BLD AUTO: 11.9 K/UL (ref 3.6–11)
WBC # BLD AUTO: 11.9 K/UL (ref 3.6–11)
WBC # BLD AUTO: 12.4 K/UL (ref 3.6–11)
WBC # BLD AUTO: 16.1 K/UL (ref 3.6–11)
WBC # BLD AUTO: 16.9 K/UL (ref 3.6–11)
WBC # BLD AUTO: 16.9 K/UL (ref 3.6–11)
WBC # BLD AUTO: 18.2 K/UL (ref 3.6–11)
WBC # BLD AUTO: 5.9 K/UL (ref 3.6–11)
WBC MORPH BLD: ABNORMAL
WBC NRBC COR # BLD: ABNORMAL 10*3/UL
WBC NRBC COR # BLD: ABNORMAL 10*3/UL
WBC URNS QL MICRO: ABNORMAL /HPF (ref 0–4)

## 2017-01-01 PROCEDURE — 77030029684 HC NEB SM VOL KT MONA -A

## 2017-01-01 PROCEDURE — 65270000029 HC RM PRIVATE

## 2017-01-01 PROCEDURE — 94640 AIRWAY INHALATION TREATMENT: CPT

## 2017-01-01 PROCEDURE — 74011250637 HC RX REV CODE- 250/637: Performed by: INTERNAL MEDICINE

## 2017-01-01 PROCEDURE — 80048 BASIC METABOLIC PNL TOTAL CA: CPT | Performed by: INTERNAL MEDICINE

## 2017-01-01 PROCEDURE — 83036 HEMOGLOBIN GLYCOSYLATED A1C: CPT | Performed by: INTERNAL MEDICINE

## 2017-01-01 PROCEDURE — 84100 ASSAY OF PHOSPHORUS: CPT | Performed by: INTERNAL MEDICINE

## 2017-01-01 PROCEDURE — 74011250636 HC RX REV CODE- 250/636: Performed by: INTERNAL MEDICINE

## 2017-01-01 PROCEDURE — 83735 ASSAY OF MAGNESIUM: CPT | Performed by: HOSPITALIST

## 2017-01-01 PROCEDURE — 36415 COLL VENOUS BLD VENIPUNCTURE: CPT | Performed by: HOSPITALIST

## 2017-01-01 PROCEDURE — 80076 HEPATIC FUNCTION PANEL: CPT | Performed by: HOSPITALIST

## 2017-01-01 PROCEDURE — 74011636637 HC RX REV CODE- 636/637: Performed by: HOSPITALIST

## 2017-01-01 PROCEDURE — 83690 ASSAY OF LIPASE: CPT | Performed by: EMERGENCY MEDICINE

## 2017-01-01 PROCEDURE — 74011250636 HC RX REV CODE- 250/636

## 2017-01-01 PROCEDURE — C1751 CATH, INF, PER/CENT/MIDLINE: HCPCS

## 2017-01-01 PROCEDURE — 82088 ASSAY OF ALDOSTERONE: CPT | Performed by: INTERNAL MEDICINE

## 2017-01-01 PROCEDURE — 80053 COMPREHEN METABOLIC PANEL: CPT | Performed by: EMERGENCY MEDICINE

## 2017-01-01 PROCEDURE — 83735 ASSAY OF MAGNESIUM: CPT | Performed by: INTERNAL MEDICINE

## 2017-01-01 PROCEDURE — 74011636320 HC RX REV CODE- 636/320: Performed by: EMERGENCY MEDICINE

## 2017-01-01 PROCEDURE — 74011000258 HC RX REV CODE- 258: Performed by: INTERNAL MEDICINE

## 2017-01-01 PROCEDURE — 74011000250 HC RX REV CODE- 250: Performed by: HOSPITALIST

## 2017-01-01 PROCEDURE — 85027 COMPLETE CBC AUTOMATED: CPT | Performed by: HOSPITALIST

## 2017-01-01 PROCEDURE — 82533 TOTAL CORTISOL: CPT | Performed by: HOSPITALIST

## 2017-01-01 PROCEDURE — 74011250636 HC RX REV CODE- 250/636: Performed by: EMERGENCY MEDICINE

## 2017-01-01 PROCEDURE — 74011250637 HC RX REV CODE- 250/637: Performed by: EMERGENCY MEDICINE

## 2017-01-01 PROCEDURE — 85027 COMPLETE CBC AUTOMATED: CPT | Performed by: EMERGENCY MEDICINE

## 2017-01-01 PROCEDURE — 83935 ASSAY OF URINE OSMOLALITY: CPT | Performed by: INTERNAL MEDICINE

## 2017-01-01 PROCEDURE — 36600 WITHDRAWAL OF ARTERIAL BLOOD: CPT | Performed by: HOSPITALIST

## 2017-01-01 PROCEDURE — 76705 ECHO EXAM OF ABDOMEN: CPT

## 2017-01-01 PROCEDURE — 80048 BASIC METABOLIC PNL TOTAL CA: CPT | Performed by: EMERGENCY MEDICINE

## 2017-01-01 PROCEDURE — 88342 IMHCHEM/IMCYTCHM 1ST ANTB: CPT | Performed by: HOSPITALIST

## 2017-01-01 PROCEDURE — 74011250637 HC RX REV CODE- 250/637: Performed by: HOSPITALIST

## 2017-01-01 PROCEDURE — 74011250636 HC RX REV CODE- 250/636: Performed by: HOSPITALIST

## 2017-01-01 PROCEDURE — 36415 COLL VENOUS BLD VENIPUNCTURE: CPT | Performed by: INTERNAL MEDICINE

## 2017-01-01 PROCEDURE — 36415 COLL VENOUS BLD VENIPUNCTURE: CPT | Performed by: EMERGENCY MEDICINE

## 2017-01-01 PROCEDURE — 80048 BASIC METABOLIC PNL TOTAL CA: CPT | Performed by: HOSPITALIST

## 2017-01-01 PROCEDURE — 81001 URINALYSIS AUTO W/SCOPE: CPT | Performed by: INTERNAL MEDICINE

## 2017-01-01 PROCEDURE — 82803 BLOOD GASES ANY COMBINATION: CPT | Performed by: HOSPITALIST

## 2017-01-01 PROCEDURE — 84443 ASSAY THYROID STIM HORMONE: CPT | Performed by: INTERNAL MEDICINE

## 2017-01-01 PROCEDURE — 81001 URINALYSIS AUTO W/SCOPE: CPT | Performed by: EMERGENCY MEDICINE

## 2017-01-01 PROCEDURE — 77030032490 HC SLV COMPR SCD KNE COVD -B

## 2017-01-01 PROCEDURE — 83735 ASSAY OF MAGNESIUM: CPT | Performed by: EMERGENCY MEDICINE

## 2017-01-01 PROCEDURE — 77030012879 HC MSK CPAP FLL FAC PHIL -B

## 2017-01-01 PROCEDURE — 65610000006 HC RM INTENSIVE CARE

## 2017-01-01 PROCEDURE — 74011000250 HC RX REV CODE- 250: Performed by: EMERGENCY MEDICINE

## 2017-01-01 PROCEDURE — 74011000250 HC RX REV CODE- 250: Performed by: INTERNAL MEDICINE

## 2017-01-01 PROCEDURE — 71020 XR CHEST PA LAT: CPT

## 2017-01-01 PROCEDURE — 94660 CPAP INITIATION&MGMT: CPT

## 2017-01-01 PROCEDURE — 80053 COMPREHEN METABOLIC PANEL: CPT | Performed by: INTERNAL MEDICINE

## 2017-01-01 PROCEDURE — 88305 TISSUE EXAM BY PATHOLOGIST: CPT | Performed by: HOSPITALIST

## 2017-01-01 PROCEDURE — 82803 BLOOD GASES ANY COMBINATION: CPT | Performed by: INTERNAL MEDICINE

## 2017-01-01 PROCEDURE — 77030027138 HC INCENT SPIROMETER -A

## 2017-01-01 PROCEDURE — 84484 ASSAY OF TROPONIN QUANT: CPT | Performed by: HOSPITALIST

## 2017-01-01 PROCEDURE — 99284 EMERGENCY DEPT VISIT MOD MDM: CPT

## 2017-01-01 PROCEDURE — 82962 GLUCOSE BLOOD TEST: CPT

## 2017-01-01 PROCEDURE — 84133 ASSAY OF URINE POTASSIUM: CPT | Performed by: INTERNAL MEDICINE

## 2017-01-01 PROCEDURE — 83930 ASSAY OF BLOOD OSMOLALITY: CPT | Performed by: INTERNAL MEDICINE

## 2017-01-01 PROCEDURE — 82378 CARCINOEMBRYONIC ANTIGEN: CPT | Performed by: INTERNAL MEDICINE

## 2017-01-01 PROCEDURE — 84100 ASSAY OF PHOSPHORUS: CPT | Performed by: HOSPITALIST

## 2017-01-01 PROCEDURE — 85730 THROMBOPLASTIN TIME PARTIAL: CPT | Performed by: HOSPITALIST

## 2017-01-01 PROCEDURE — 94761 N-INVAS EAR/PLS OXIMETRY MLT: CPT

## 2017-01-01 PROCEDURE — 93970 EXTREMITY STUDY: CPT

## 2017-01-01 PROCEDURE — 88172 CYTP DX EVAL FNA 1ST EA SITE: CPT | Performed by: HOSPITALIST

## 2017-01-01 PROCEDURE — 83690 ASSAY OF LIPASE: CPT | Performed by: HOSPITALIST

## 2017-01-01 PROCEDURE — 93005 ELECTROCARDIOGRAM TRACING: CPT

## 2017-01-01 PROCEDURE — 80053 COMPREHEN METABOLIC PANEL: CPT | Performed by: HOSPITALIST

## 2017-01-01 PROCEDURE — 65660000000 HC RM CCU STEPDOWN

## 2017-01-01 PROCEDURE — 74011250636 HC RX REV CODE- 250/636: Performed by: RADIOLOGY

## 2017-01-01 PROCEDURE — 36600 WITHDRAWAL OF ARTERIAL BLOOD: CPT | Performed by: INTERNAL MEDICINE

## 2017-01-01 PROCEDURE — 85025 COMPLETE CBC W/AUTO DIFF WBC: CPT | Performed by: HOSPITALIST

## 2017-01-01 PROCEDURE — 85025 COMPLETE CBC W/AUTO DIFF WBC: CPT | Performed by: EMERGENCY MEDICINE

## 2017-01-01 PROCEDURE — 74011636637 HC RX REV CODE- 636/637: Performed by: INTERNAL MEDICINE

## 2017-01-01 PROCEDURE — 74011000258 HC RX REV CODE- 258: Performed by: HOSPITALIST

## 2017-01-01 PROCEDURE — 83605 ASSAY OF LACTIC ACID: CPT | Performed by: INTERNAL MEDICINE

## 2017-01-01 PROCEDURE — 82550 ASSAY OF CK (CPK): CPT | Performed by: HOSPITALIST

## 2017-01-01 PROCEDURE — 99285 EMERGENCY DEPT VISIT HI MDM: CPT

## 2017-01-01 PROCEDURE — 36556 INSERT NON-TUNNEL CV CATH: CPT

## 2017-01-01 PROCEDURE — 99152 MOD SED SAME PHYS/QHP 5/>YRS: CPT

## 2017-01-01 PROCEDURE — 84439 ASSAY OF FREE THYROXINE: CPT | Performed by: HOSPITALIST

## 2017-01-01 PROCEDURE — 77030013140 HC MSK NEB VYRM -A

## 2017-01-01 PROCEDURE — 77010033678 HC OXYGEN DAILY

## 2017-01-01 PROCEDURE — 93306 TTE W/DOPPLER COMPLETE: CPT

## 2017-01-01 PROCEDURE — 71010 XR CHEST PORT: CPT

## 2017-01-01 PROCEDURE — 96374 THER/PROPH/DIAG INJ IV PUSH: CPT

## 2017-01-01 PROCEDURE — 84300 ASSAY OF URINE SODIUM: CPT | Performed by: INTERNAL MEDICINE

## 2017-01-01 PROCEDURE — 85610 PROTHROMBIN TIME: CPT | Performed by: HOSPITALIST

## 2017-01-01 PROCEDURE — 71260 CT THORAX DX C+: CPT

## 2017-01-01 PROCEDURE — 72131 CT LUMBAR SPINE W/O DYE: CPT

## 2017-01-01 PROCEDURE — 82105 ALPHA-FETOPROTEIN SERUM: CPT | Performed by: INTERNAL MEDICINE

## 2017-01-01 PROCEDURE — 85027 COMPLETE CBC AUTOMATED: CPT | Performed by: INTERNAL MEDICINE

## 2017-01-01 PROCEDURE — 81050 URINALYSIS VOLUME MEASURE: CPT | Performed by: INTERNAL MEDICINE

## 2017-01-01 PROCEDURE — 77030034849

## 2017-01-01 PROCEDURE — 80307 DRUG TEST PRSMV CHEM ANLYZR: CPT | Performed by: INTERNAL MEDICINE

## 2017-01-01 PROCEDURE — 74011636320 HC RX REV CODE- 636/320: Performed by: INTERNAL MEDICINE

## 2017-01-01 PROCEDURE — 96375 TX/PRO/DX INJ NEW DRUG ADDON: CPT

## 2017-01-01 PROCEDURE — 74177 CT ABD & PELVIS W/CONTRAST: CPT

## 2017-01-01 PROCEDURE — 83690 ASSAY OF LIPASE: CPT | Performed by: INTERNAL MEDICINE

## 2017-01-01 PROCEDURE — 74011000250 HC RX REV CODE- 250

## 2017-01-01 PROCEDURE — 02HV33Z INSERTION OF INFUSION DEVICE INTO SUPERIOR VENA CAVA, PERCUTANEOUS APPROACH: ICD-10-PCS | Performed by: ANESTHESIOLOGY

## 2017-01-01 PROCEDURE — 96365 THER/PROPH/DIAG IV INF INIT: CPT

## 2017-01-01 PROCEDURE — 87086 URINE CULTURE/COLONY COUNT: CPT | Performed by: INTERNAL MEDICINE

## 2017-01-01 PROCEDURE — 74011636637 HC RX REV CODE- 636/637: Performed by: EMERGENCY MEDICINE

## 2017-01-01 PROCEDURE — 84484 ASSAY OF TROPONIN QUANT: CPT | Performed by: INTERNAL MEDICINE

## 2017-01-01 PROCEDURE — 86301 IMMUNOASSAY TUMOR CA 19-9: CPT | Performed by: INTERNAL MEDICINE

## 2017-01-01 PROCEDURE — 74022 RADEX COMPL AQT ABD SERIES: CPT

## 2017-01-01 PROCEDURE — 83880 ASSAY OF NATRIURETIC PEPTIDE: CPT | Performed by: EMERGENCY MEDICINE

## 2017-01-01 PROCEDURE — 88173 CYTOPATH EVAL FNA REPORT: CPT | Performed by: HOSPITALIST

## 2017-01-01 PROCEDURE — 74011000250 HC RX REV CODE- 250: Performed by: RADIOLOGY

## 2017-01-01 PROCEDURE — 82550 ASSAY OF CK (CPK): CPT | Performed by: INTERNAL MEDICINE

## 2017-01-01 PROCEDURE — 87086 URINE CULTURE/COLONY COUNT: CPT | Performed by: EMERGENCY MEDICINE

## 2017-01-01 PROCEDURE — 70450 CT HEAD/BRAIN W/O DYE: CPT

## 2017-01-01 PROCEDURE — 82024 ASSAY OF ACTH: CPT | Performed by: INTERNAL MEDICINE

## 2017-01-01 PROCEDURE — 77030003503 HC NDL BIOP TISS BD -B

## 2017-01-01 PROCEDURE — 84443 ASSAY THYROID STIM HORMONE: CPT | Performed by: HOSPITALIST

## 2017-01-01 PROCEDURE — 0FB13ZX EXCISION OF RIGHT LOBE LIVER, PERCUTANEOUS APPROACH, DIAGNOSTIC: ICD-10-PCS | Performed by: RADIOLOGY

## 2017-01-01 RX ORDER — FENTANYL CITRATE 50 UG/ML
100 INJECTION, SOLUTION INTRAMUSCULAR; INTRAVENOUS
Status: DISCONTINUED | OUTPATIENT
Start: 2017-01-01 | End: 2017-01-01

## 2017-01-01 RX ORDER — VALSARTAN 320 MG/1
320 TABLET ORAL DAILY
COMMUNITY

## 2017-01-01 RX ORDER — POTASSIUM CHLORIDE 750 MG/1
30 TABLET, FILM COATED, EXTENDED RELEASE ORAL 2 TIMES DAILY
Status: DISCONTINUED | OUTPATIENT
Start: 2017-01-01 | End: 2017-01-01

## 2017-01-01 RX ORDER — LEVOTHYROXINE SODIUM 100 UG/1
100 TABLET ORAL
Status: DISCONTINUED | OUTPATIENT
Start: 2017-01-01 | End: 2017-01-01

## 2017-01-01 RX ORDER — MIDAZOLAM HYDROCHLORIDE 1 MG/ML
5 INJECTION, SOLUTION INTRAMUSCULAR; INTRAVENOUS
Status: DISCONTINUED | OUTPATIENT
Start: 2017-01-01 | End: 2017-01-01

## 2017-01-01 RX ORDER — SODIUM,POTASSIUM PHOSPHATES 280-250MG
2 POWDER IN PACKET (EA) ORAL 4 TIMES DAILY
Status: DISPENSED | OUTPATIENT
Start: 2017-01-01 | End: 2017-01-01

## 2017-01-01 RX ORDER — GUAIFENESIN 600 MG/1
600 TABLET, EXTENDED RELEASE ORAL 2 TIMES DAILY
Qty: 14 TAB | Refills: 0 | Status: SHIPPED | OUTPATIENT
Start: 2017-01-01 | End: 2017-01-01

## 2017-01-01 RX ORDER — POTASSIUM CHLORIDE 750 MG/1
40 TABLET, FILM COATED, EXTENDED RELEASE ORAL
Status: COMPLETED | OUTPATIENT
Start: 2017-01-01 | End: 2017-01-01

## 2017-01-01 RX ORDER — GLIMEPIRIDE 1 MG/1
1 TABLET ORAL
Status: DISCONTINUED | OUTPATIENT
Start: 2017-01-01 | End: 2017-01-01

## 2017-01-01 RX ORDER — FUROSEMIDE 10 MG/ML
40 INJECTION INTRAMUSCULAR; INTRAVENOUS ONCE
Status: COMPLETED | OUTPATIENT
Start: 2017-01-01 | End: 2017-01-01

## 2017-01-01 RX ORDER — POTASSIUM CHLORIDE 7.45 MG/ML
10 INJECTION INTRAVENOUS
Status: COMPLETED | OUTPATIENT
Start: 2017-01-01 | End: 2017-01-01

## 2017-01-01 RX ORDER — LEVALBUTEROL INHALATION SOLUTION 1.25 MG/3ML
1.25 SOLUTION RESPIRATORY (INHALATION)
Status: DISCONTINUED | OUTPATIENT
Start: 2017-01-01 | End: 2017-01-01

## 2017-01-01 RX ORDER — MAGNESIUM SULFATE HEPTAHYDRATE 40 MG/ML
2 INJECTION, SOLUTION INTRAVENOUS
Status: ACTIVE | OUTPATIENT
Start: 2017-01-01 | End: 2017-01-01

## 2017-01-01 RX ORDER — POTASSIUM CHLORIDE 14.9 MG/ML
10 INJECTION INTRAVENOUS
Status: DISCONTINUED | OUTPATIENT
Start: 2017-01-01 | End: 2017-01-01

## 2017-01-01 RX ORDER — IPRATROPIUM BROMIDE AND ALBUTEROL SULFATE 2.5; .5 MG/3ML; MG/3ML
3 SOLUTION RESPIRATORY (INHALATION)
Status: DISCONTINUED | OUTPATIENT
Start: 2017-01-01 | End: 2017-01-01

## 2017-01-01 RX ORDER — CALC/MAG/B COMPLEX/D3/HERB 61
30 TABLET ORAL
COMMUNITY

## 2017-01-01 RX ORDER — METFORMIN HYDROCHLORIDE 500 MG/1
1000 TABLET, FILM COATED, EXTENDED RELEASE ORAL
COMMUNITY
End: 2017-01-01

## 2017-01-01 RX ORDER — LEVOTHYROXINE SODIUM 88 UG/1
88 TABLET ORAL
Status: DISCONTINUED | OUTPATIENT
Start: 2017-01-01 | End: 2017-01-01 | Stop reason: HOSPADM

## 2017-01-01 RX ORDER — LIDOCAINE HYDROCHLORIDE 10 MG/ML
10 INJECTION, SOLUTION EPIDURAL; INFILTRATION; INTRACAUDAL; PERINEURAL
Status: COMPLETED | OUTPATIENT
Start: 2017-01-01 | End: 2017-01-01

## 2017-01-01 RX ORDER — INSULIN LISPRO 100 [IU]/ML
INJECTION, SOLUTION INTRAVENOUS; SUBCUTANEOUS
Status: DISCONTINUED | OUTPATIENT
Start: 2017-01-01 | End: 2017-01-01 | Stop reason: HOSPADM

## 2017-01-01 RX ORDER — POTASSIUM CHLORIDE 20 MEQ/1
40 TABLET, EXTENDED RELEASE ORAL
Status: COMPLETED | OUTPATIENT
Start: 2017-01-01 | End: 2017-01-01

## 2017-01-01 RX ORDER — SODIUM CHLORIDE 0.9 % (FLUSH) 0.9 %
5-10 SYRINGE (ML) INJECTION EVERY 8 HOURS
Status: DISCONTINUED | OUTPATIENT
Start: 2017-01-01 | End: 2017-01-01 | Stop reason: HOSPADM

## 2017-01-01 RX ORDER — LEVOTHYROXINE SODIUM 88 UG/1
88 TABLET ORAL
COMMUNITY

## 2017-01-01 RX ORDER — DEXTROSE 50 % IN WATER (D50W) INTRAVENOUS SYRINGE
12.5-25 AS NEEDED
Status: DISCONTINUED | OUTPATIENT
Start: 2017-01-01 | End: 2017-01-01 | Stop reason: HOSPADM

## 2017-01-01 RX ORDER — ATORVASTATIN CALCIUM 10 MG/1
20 TABLET, FILM COATED ORAL
Status: DISCONTINUED | OUTPATIENT
Start: 2017-01-01 | End: 2017-01-01

## 2017-01-01 RX ORDER — INSULIN LISPRO 100 [IU]/ML
INJECTION, SOLUTION INTRAVENOUS; SUBCUTANEOUS
Status: DISCONTINUED | OUTPATIENT
Start: 2017-01-01 | End: 2017-01-01

## 2017-01-01 RX ORDER — DEXTROSE 50 % IN WATER (D50W) INTRAVENOUS SYRINGE
25 ONCE
Status: COMPLETED | OUTPATIENT
Start: 2017-01-01 | End: 2017-01-01

## 2017-01-01 RX ORDER — FLUTICASONE FUROATE AND VILANTEROL 100; 25 UG/1; UG/1
1 POWDER RESPIRATORY (INHALATION) DAILY
Status: DISCONTINUED | OUTPATIENT
Start: 2017-01-01 | End: 2017-01-01 | Stop reason: HOSPADM

## 2017-01-01 RX ORDER — SPIRONOLACTONE 50 MG/1
50 TABLET, FILM COATED ORAL DAILY
Qty: 30 TAB | Refills: 0 | Status: SHIPPED | OUTPATIENT
Start: 2017-01-01 | End: 2017-01-01

## 2017-01-01 RX ORDER — MELATONIN
1000 DAILY
Status: DISCONTINUED | OUTPATIENT
Start: 2017-01-01 | End: 2017-01-01

## 2017-01-01 RX ORDER — CALCIUM CHLORIDE INJECTION 100 MG/ML
INJECTION, SOLUTION INTRAVENOUS
Status: COMPLETED
Start: 2017-01-01 | End: 2017-01-01

## 2017-01-01 RX ORDER — SODIUM CHLORIDE 0.9 % (FLUSH) 0.9 %
5-10 SYRINGE (ML) INJECTION AS NEEDED
Status: DISCONTINUED | OUTPATIENT
Start: 2017-01-01 | End: 2017-01-01 | Stop reason: HOSPADM

## 2017-01-01 RX ORDER — POTASSIUM CHLORIDE 20 MEQ/1
40 TABLET, EXTENDED RELEASE ORAL
Status: DISCONTINUED | OUTPATIENT
Start: 2017-01-01 | End: 2017-01-01 | Stop reason: SDUPTHER

## 2017-01-01 RX ORDER — GUAIFENESIN 100 MG/5ML
81 LIQUID (ML) ORAL
Status: DISCONTINUED | OUTPATIENT
Start: 2017-01-01 | End: 2017-01-01 | Stop reason: HOSPADM

## 2017-01-01 RX ORDER — MUPIROCIN 20 MG/G
OINTMENT TOPICAL 2 TIMES DAILY
Status: DISCONTINUED | OUTPATIENT
Start: 2017-01-01 | End: 2017-01-01

## 2017-01-01 RX ORDER — VALSARTAN 160 MG/1
320 TABLET ORAL DAILY
Status: DISCONTINUED | OUTPATIENT
Start: 2017-01-01 | End: 2017-01-01 | Stop reason: HOSPADM

## 2017-01-01 RX ORDER — LANOLIN ALCOHOL/MO/W.PET/CERES
400 CREAM (GRAM) TOPICAL 2 TIMES DAILY
Qty: 60 TAB | Refills: 1 | Status: SHIPPED | OUTPATIENT
Start: 2017-01-01 | End: 2017-12-01

## 2017-01-01 RX ORDER — VALSARTAN 80 MG/1
320 TABLET ORAL DAILY
Status: DISCONTINUED | OUTPATIENT
Start: 2017-01-01 | End: 2017-01-01 | Stop reason: HOSPADM

## 2017-01-01 RX ORDER — FUROSEMIDE 10 MG/ML
60 INJECTION INTRAMUSCULAR; INTRAVENOUS ONCE
Status: COMPLETED | OUTPATIENT
Start: 2017-01-01 | End: 2017-01-01

## 2017-01-01 RX ORDER — POTASSIUM CHLORIDE 7.45 MG/ML
10 INJECTION INTRAVENOUS
Status: DISCONTINUED | OUTPATIENT
Start: 2017-01-01 | End: 2017-01-01 | Stop reason: SDUPTHER

## 2017-01-01 RX ORDER — CHOLECALCIFEROL TAB 125 MCG (5000 UNIT) 125 MCG
5000 TAB ORAL DAILY
Status: ON HOLD | COMMUNITY
End: 2017-01-01 | Stop reason: DRUGHIGH

## 2017-01-01 RX ORDER — DEXTROSE 50 % IN WATER (D50W) INTRAVENOUS SYRINGE
12.5-25 AS NEEDED
Status: DISCONTINUED | OUTPATIENT
Start: 2017-01-01 | End: 2017-01-01

## 2017-01-01 RX ORDER — POTASSIUM CHLORIDE 20 MEQ/1
40 TABLET, EXTENDED RELEASE ORAL 2 TIMES DAILY
Status: DISCONTINUED | OUTPATIENT
Start: 2017-01-01 | End: 2017-01-01

## 2017-01-01 RX ORDER — PANTOPRAZOLE SODIUM 40 MG/1
40 TABLET, DELAYED RELEASE ORAL
Status: DISCONTINUED | OUTPATIENT
Start: 2017-01-01 | End: 2017-01-01 | Stop reason: HOSPADM

## 2017-01-01 RX ORDER — CETIRIZINE HCL 10 MG
10 TABLET ORAL DAILY
Qty: 20 TAB | Refills: 0 | Status: SHIPPED | OUTPATIENT
Start: 2017-01-01 | End: 2017-01-01

## 2017-01-01 RX ORDER — SODIUM BICARBONATE 1 MEQ/ML
50 SYRINGE (ML) INTRAVENOUS ONCE
Status: COMPLETED | OUTPATIENT
Start: 2017-01-01 | End: 2017-01-01

## 2017-01-01 RX ORDER — DIGOXIN 0.25 MG/ML
250 INJECTION INTRAMUSCULAR; INTRAVENOUS
Status: COMPLETED | OUTPATIENT
Start: 2017-01-01 | End: 2017-01-01

## 2017-01-01 RX ORDER — ADHESIVE BANDAGE
30 BANDAGE TOPICAL
Status: DISCONTINUED | OUTPATIENT
Start: 2017-01-01 | End: 2017-01-01 | Stop reason: HOSPADM

## 2017-01-01 RX ORDER — LANOLIN ALCOHOL/MO/W.PET/CERES
400 CREAM (GRAM) TOPICAL 2 TIMES DAILY
Status: DISCONTINUED | OUTPATIENT
Start: 2017-01-01 | End: 2017-01-01

## 2017-01-01 RX ORDER — AMOXICILLIN 250 MG
1 CAPSULE ORAL DAILY
Status: DISCONTINUED | OUTPATIENT
Start: 2017-01-01 | End: 2017-01-01

## 2017-01-01 RX ORDER — FENTANYL CITRATE 50 UG/ML
50 INJECTION, SOLUTION INTRAMUSCULAR; INTRAVENOUS
Status: COMPLETED | OUTPATIENT
Start: 2017-01-01 | End: 2017-01-01

## 2017-01-01 RX ORDER — ATORVASTATIN CALCIUM 40 MG/1
40 TABLET, FILM COATED ORAL
Status: DISCONTINUED | OUTPATIENT
Start: 2017-01-01 | End: 2017-01-01 | Stop reason: HOSPADM

## 2017-01-01 RX ORDER — PANTOPRAZOLE SODIUM 20 MG/1
20 TABLET, DELAYED RELEASE ORAL
Status: DISCONTINUED | OUTPATIENT
Start: 2017-01-01 | End: 2017-01-01 | Stop reason: CLARIF

## 2017-01-01 RX ORDER — MAGNESIUM SULFATE 100 %
4 CRYSTALS MISCELLANEOUS AS NEEDED
Status: DISCONTINUED | OUTPATIENT
Start: 2017-01-01 | End: 2017-01-01 | Stop reason: HOSPADM

## 2017-01-01 RX ORDER — DIGOXIN 0.25 MG/ML
125 INJECTION INTRAMUSCULAR; INTRAVENOUS DAILY
Status: DISCONTINUED | OUTPATIENT
Start: 2017-01-01 | End: 2017-01-01

## 2017-01-01 RX ORDER — ACETAMINOPHEN 325 MG/1
650 TABLET ORAL
Status: DISCONTINUED | OUTPATIENT
Start: 2017-01-01 | End: 2017-01-01 | Stop reason: HOSPADM

## 2017-01-01 RX ORDER — MUPIROCIN 20 MG/G
OINTMENT TOPICAL 2 TIMES DAILY
Status: DISCONTINUED | OUTPATIENT
Start: 2017-01-01 | End: 2017-01-01 | Stop reason: HOSPADM

## 2017-01-01 RX ORDER — IPRATROPIUM BROMIDE AND ALBUTEROL SULFATE 2.5; .5 MG/3ML; MG/3ML
3 SOLUTION RESPIRATORY (INHALATION)
Status: COMPLETED | OUTPATIENT
Start: 2017-01-01 | End: 2017-01-01

## 2017-01-01 RX ORDER — SODIUM CHLORIDE 9 MG/ML
25 INJECTION, SOLUTION INTRAVENOUS CONTINUOUS
Status: DISCONTINUED | OUTPATIENT
Start: 2017-01-01 | End: 2017-01-01

## 2017-01-01 RX ORDER — LEVOTHYROXINE SODIUM 88 UG/1
88 TABLET ORAL
Status: DISCONTINUED | OUTPATIENT
Start: 2017-01-01 | End: 2017-01-01

## 2017-01-01 RX ORDER — POTASSIUM CHLORIDE 750 MG/1
40 TABLET, FILM COATED, EXTENDED RELEASE ORAL 3 TIMES DAILY
Status: COMPLETED | OUTPATIENT
Start: 2017-01-01 | End: 2017-01-01

## 2017-01-01 RX ORDER — METFORMIN HYDROCHLORIDE 500 MG/1
500 TABLET, FILM COATED, EXTENDED RELEASE ORAL
COMMUNITY
End: 2017-01-01

## 2017-01-01 RX ORDER — IPRATROPIUM BROMIDE AND ALBUTEROL SULFATE 2.5; .5 MG/3ML; MG/3ML
3 SOLUTION RESPIRATORY (INHALATION)
Status: DISCONTINUED | OUTPATIENT
Start: 2017-01-01 | End: 2017-01-01 | Stop reason: HOSPADM

## 2017-01-01 RX ORDER — HYDRALAZINE HYDROCHLORIDE 20 MG/ML
10 INJECTION INTRAMUSCULAR; INTRAVENOUS
Status: DISCONTINUED | OUTPATIENT
Start: 2017-01-01 | End: 2017-01-01 | Stop reason: HOSPADM

## 2017-01-01 RX ORDER — OXYCODONE HYDROCHLORIDE 5 MG/1
5 TABLET ORAL
Status: DISCONTINUED | OUTPATIENT
Start: 2017-01-01 | End: 2017-01-01 | Stop reason: HOSPADM

## 2017-01-01 RX ORDER — FLUTICASONE FUROATE AND VILANTEROL 100; 25 UG/1; UG/1
1 POWDER RESPIRATORY (INHALATION) DAILY
Status: DISCONTINUED | OUTPATIENT
Start: 2017-01-01 | End: 2017-01-01

## 2017-01-01 RX ORDER — GLIMEPIRIDE 1 MG/1
1 TABLET ORAL
Qty: 30 TAB | Refills: 0 | Status: SHIPPED | OUTPATIENT
Start: 2017-01-01

## 2017-01-01 RX ORDER — SODIUM CHLORIDE 0.9 % (FLUSH) 0.9 %
10 SYRINGE (ML) INJECTION
Status: COMPLETED | OUTPATIENT
Start: 2017-01-01 | End: 2017-01-01

## 2017-01-01 RX ORDER — POTASSIUM CHLORIDE AND SODIUM CHLORIDE 900; 300 MG/100ML; MG/100ML
INJECTION, SOLUTION INTRAVENOUS CONTINUOUS
Status: DISCONTINUED | OUTPATIENT
Start: 2017-01-01 | End: 2017-01-01 | Stop reason: HOSPADM

## 2017-01-01 RX ORDER — POTASSIUM CHLORIDE 750 MG/1
30 TABLET, FILM COATED, EXTENDED RELEASE ORAL 3 TIMES DAILY
Status: DISCONTINUED | OUTPATIENT
Start: 2017-01-01 | End: 2017-01-01 | Stop reason: HOSPADM

## 2017-01-01 RX ORDER — METFORMIN HYDROCHLORIDE 500 MG/1
1000 TABLET, EXTENDED RELEASE ORAL 2 TIMES DAILY WITH MEALS
Status: DISCONTINUED | OUTPATIENT
Start: 2017-01-01 | End: 2017-01-01 | Stop reason: HOSPADM

## 2017-01-01 RX ORDER — DEXTROSE MONOHYDRATE 100 MG/ML
125-250 INJECTION, SOLUTION INTRAVENOUS AS NEEDED
Status: DISCONTINUED | OUTPATIENT
Start: 2017-01-01 | End: 2017-01-01 | Stop reason: HOSPADM

## 2017-01-01 RX ORDER — SPIRONOLACTONE 25 MG/1
50 TABLET ORAL 2 TIMES DAILY
Status: DISCONTINUED | OUTPATIENT
Start: 2017-01-01 | End: 2017-01-01 | Stop reason: HOSPADM

## 2017-01-01 RX ORDER — FUROSEMIDE 10 MG/ML
40 INJECTION INTRAMUSCULAR; INTRAVENOUS 2 TIMES DAILY
Status: DISCONTINUED | OUTPATIENT
Start: 2017-01-01 | End: 2017-01-01

## 2017-01-01 RX ORDER — OXYCODONE HYDROCHLORIDE 5 MG/1
5 TABLET ORAL
Qty: 30 TAB | Refills: 0 | Status: SHIPPED | OUTPATIENT
Start: 2017-01-01

## 2017-01-01 RX ORDER — SODIUM BICARBONATE 1 MEQ/ML
SYRINGE (ML) INTRAVENOUS
Status: DISCONTINUED
Start: 2017-01-01 | End: 2017-01-01

## 2017-01-01 RX ORDER — BUMETANIDE 0.25 MG/ML
0.5 INJECTION INTRAMUSCULAR; INTRAVENOUS ONCE
Status: COMPLETED | OUTPATIENT
Start: 2017-01-01 | End: 2017-01-01

## 2017-01-01 RX ORDER — LORAZEPAM 2 MG/ML
INJECTION INTRAMUSCULAR
Status: COMPLETED
Start: 2017-01-01 | End: 2017-01-01

## 2017-01-01 RX ORDER — MELATONIN
1000 DAILY
COMMUNITY

## 2017-01-01 RX ORDER — AMLODIPINE BESYLATE 5 MG/1
5 TABLET ORAL DAILY
Status: DISCONTINUED | OUTPATIENT
Start: 2017-01-01 | End: 2017-01-01

## 2017-01-01 RX ORDER — PANTOPRAZOLE SODIUM 40 MG/1
40 TABLET, DELAYED RELEASE ORAL DAILY
Status: DISCONTINUED | OUTPATIENT
Start: 2017-01-01 | End: 2017-01-01

## 2017-01-01 RX ORDER — NALOXONE HYDROCHLORIDE 0.4 MG/ML
0.4 INJECTION, SOLUTION INTRAMUSCULAR; INTRAVENOUS; SUBCUTANEOUS AS NEEDED
Status: DISCONTINUED | OUTPATIENT
Start: 2017-01-01 | End: 2017-01-01 | Stop reason: HOSPADM

## 2017-01-01 RX ORDER — VALSARTAN 160 MG/1
320 TABLET ORAL DAILY
Status: DISCONTINUED | OUTPATIENT
Start: 2017-01-01 | End: 2017-01-01

## 2017-01-01 RX ORDER — POTASSIUM CHLORIDE 750 MG/1
40 TABLET, FILM COATED, EXTENDED RELEASE ORAL 3 TIMES DAILY
Status: DISCONTINUED | OUTPATIENT
Start: 2017-01-01 | End: 2017-01-01

## 2017-01-01 RX ORDER — PREDNISONE 20 MG/1
60 TABLET ORAL
Status: COMPLETED | OUTPATIENT
Start: 2017-01-01 | End: 2017-01-01

## 2017-01-01 RX ORDER — ONDANSETRON 2 MG/ML
4 INJECTION INTRAMUSCULAR; INTRAVENOUS
Status: DISCONTINUED | OUTPATIENT
Start: 2017-01-01 | End: 2017-01-01 | Stop reason: HOSPADM

## 2017-01-01 RX ORDER — PREDNISONE 5 MG/1
10 TABLET ORAL
Status: COMPLETED | OUTPATIENT
Start: 2017-01-01 | End: 2017-01-01

## 2017-01-01 RX ORDER — LORAZEPAM 2 MG/ML
1 INJECTION INTRAMUSCULAR ONCE
Status: COMPLETED | OUTPATIENT
Start: 2017-01-01 | End: 2017-01-01

## 2017-01-01 RX ORDER — SODIUM CHLORIDE 9 MG/ML
50 INJECTION, SOLUTION INTRAVENOUS
Status: COMPLETED | OUTPATIENT
Start: 2017-01-01 | End: 2017-01-01

## 2017-01-01 RX ORDER — METHYLPREDNISOLONE 4 MG/1
TABLET ORAL
COMMUNITY
End: 2017-01-01

## 2017-01-01 RX ORDER — METOPROLOL TARTRATE 5 MG/5ML
INJECTION INTRAVENOUS
Status: DISPENSED
Start: 2017-01-01 | End: 2017-01-01

## 2017-01-01 RX ORDER — CEFEPIME HYDROCHLORIDE 1 G/1
1 INJECTION, POWDER, FOR SOLUTION INTRAMUSCULAR; INTRAVENOUS EVERY 12 HOURS
Status: DISCONTINUED | OUTPATIENT
Start: 2017-01-01 | End: 2017-01-01 | Stop reason: SDUPTHER

## 2017-01-01 RX ORDER — FLUTICASONE FUROATE AND VILANTEROL 100; 25 UG/1; UG/1
1 POWDER RESPIRATORY (INHALATION) DAILY
Qty: 1 INHALER | Refills: 0 | Status: SHIPPED | OUTPATIENT
Start: 2017-01-01

## 2017-01-01 RX ORDER — SODIUM BICARBONATE 1 MEQ/ML
50 SYRINGE (ML) INTRAVENOUS
Status: COMPLETED | OUTPATIENT
Start: 2017-01-01 | End: 2017-01-01

## 2017-01-01 RX ORDER — SODIUM CHLORIDE 0.9 % (FLUSH) 0.9 %
5-10 SYRINGE (ML) INJECTION EVERY 8 HOURS
Status: DISCONTINUED | OUTPATIENT
Start: 2017-01-01 | End: 2017-01-01

## 2017-01-01 RX ORDER — POTASSIUM CHLORIDE 750 MG/1
30 TABLET, FILM COATED, EXTENDED RELEASE ORAL 3 TIMES DAILY
Qty: 270 TAB | Refills: 1 | Status: SHIPPED | OUTPATIENT
Start: 2017-01-01 | End: 2017-12-01

## 2017-01-01 RX ORDER — INSULIN LISPRO 100 [IU]/ML
INJECTION, SOLUTION INTRAVENOUS; SUBCUTANEOUS EVERY 6 HOURS
Status: DISCONTINUED | OUTPATIENT
Start: 2017-01-01 | End: 2017-01-01

## 2017-01-01 RX ORDER — GUAIFENESIN 100 MG/5ML
100 SOLUTION ORAL
COMMUNITY
End: 2017-01-01

## 2017-01-01 RX ORDER — DILTIAZEM HYDROCHLORIDE 5 MG/ML
10 INJECTION INTRAVENOUS ONCE
Status: COMPLETED | OUTPATIENT
Start: 2017-01-01 | End: 2017-01-01

## 2017-01-01 RX ORDER — LANOLIN ALCOHOL/MO/W.PET/CERES
400 CREAM (GRAM) TOPICAL 2 TIMES DAILY
Status: ON HOLD | COMMUNITY
End: 2017-01-01

## 2017-01-01 RX ORDER — SODIUM CHLORIDE 9 MG/ML
1000 INJECTION, SOLUTION INTRAVENOUS ONCE
Status: COMPLETED | OUTPATIENT
Start: 2017-01-01 | End: 2017-01-01

## 2017-01-01 RX ORDER — BUMETANIDE 0.25 MG/ML
2 INJECTION INTRAMUSCULAR; INTRAVENOUS
Status: COMPLETED | OUTPATIENT
Start: 2017-01-01 | End: 2017-01-01

## 2017-01-01 RX ORDER — GUAIFENESIN 100 MG/5ML
100 SOLUTION ORAL 3 TIMES DAILY
Status: DISCONTINUED | OUTPATIENT
Start: 2017-01-01 | End: 2017-01-01 | Stop reason: HOSPADM

## 2017-01-01 RX ORDER — GUAIFENESIN 600 MG/1
600 TABLET, EXTENDED RELEASE ORAL EVERY 12 HOURS
Status: DISCONTINUED | OUTPATIENT
Start: 2017-01-01 | End: 2017-01-01

## 2017-01-01 RX ORDER — METHYLPREDNISOLONE 4 MG/1
TABLET ORAL
Qty: 1 DOSE PACK | Refills: 0 | Status: SHIPPED | OUTPATIENT
Start: 2017-01-01 | End: 2017-01-01

## 2017-01-01 RX ORDER — MORPHINE SULFATE 2 MG/ML
1 INJECTION, SOLUTION INTRAMUSCULAR; INTRAVENOUS
Status: DISCONTINUED | OUTPATIENT
Start: 2017-01-01 | End: 2017-01-01 | Stop reason: HOSPADM

## 2017-01-01 RX ORDER — THERA TABS 400 MCG
1 TAB ORAL DAILY
Status: DISCONTINUED | OUTPATIENT
Start: 2017-01-01 | End: 2017-01-01 | Stop reason: HOSPADM

## 2017-01-01 RX ORDER — IPRATROPIUM BROMIDE AND ALBUTEROL SULFATE 2.5; .5 MG/3ML; MG/3ML
3 SOLUTION RESPIRATORY (INHALATION)
Qty: 100 NEBULE | Refills: 0 | Status: SHIPPED | OUTPATIENT
Start: 2017-01-01

## 2017-01-01 RX ORDER — ALBUTEROL SULFATE 90 UG/1
2 AEROSOL, METERED RESPIRATORY (INHALATION)
COMMUNITY

## 2017-01-01 RX ORDER — KETOCONAZOLE 200 MG/1
200 TABLET ORAL 2 TIMES DAILY
Qty: 60 TAB | Refills: 1 | Status: SHIPPED | OUTPATIENT
Start: 2017-01-01 | End: 2017-12-01

## 2017-01-01 RX ORDER — AMLODIPINE BESYLATE 5 MG/1
5 TABLET ORAL DAILY
COMMUNITY

## 2017-01-01 RX ORDER — CETIRIZINE HCL 10 MG
10 TABLET ORAL
Status: COMPLETED | OUTPATIENT
Start: 2017-01-01 | End: 2017-01-01

## 2017-01-01 RX ORDER — NEBULIZER AND COMPRESSOR
EACH MISCELLANEOUS
Qty: 1 EACH | Refills: 0 | Status: SHIPPED | OUTPATIENT
Start: 2017-01-01 | End: 2017-01-01

## 2017-01-01 RX ORDER — LANOLIN ALCOHOL/MO/W.PET/CERES
400 CREAM (GRAM) TOPICAL 2 TIMES DAILY
Status: DISCONTINUED | OUTPATIENT
Start: 2017-01-01 | End: 2017-01-01 | Stop reason: HOSPADM

## 2017-01-01 RX ORDER — POTASSIUM CHLORIDE 750 MG/1
30 TABLET, FILM COATED, EXTENDED RELEASE ORAL
Status: COMPLETED | OUTPATIENT
Start: 2017-01-01 | End: 2017-01-01

## 2017-01-01 RX ORDER — VALSARTAN AND HYDROCHLOROTHIAZIDE 320; 25 MG/1; MG/1
1 TABLET, FILM COATED ORAL DAILY
Status: ON HOLD | COMMUNITY
End: 2017-01-01

## 2017-01-01 RX ORDER — VALSARTAN 320 MG/1
320 TABLET ORAL DAILY
Qty: 30 TAB | Refills: 0 | Status: SHIPPED | OUTPATIENT
Start: 2017-01-01 | End: 2017-01-01 | Stop reason: DRUGHIGH

## 2017-01-01 RX ORDER — POLYETHYLENE GLYCOL 3350 17 G/17G
17 POWDER, FOR SOLUTION ORAL DAILY
Status: DISCONTINUED | OUTPATIENT
Start: 2017-01-01 | End: 2017-01-01

## 2017-01-01 RX ORDER — SPIRONOLACTONE 25 MG/1
50 TABLET ORAL DAILY
Status: DISCONTINUED | OUTPATIENT
Start: 2017-01-01 | End: 2017-01-01

## 2017-01-01 RX ORDER — GUAIFENESIN 600 MG/1
600 TABLET, EXTENDED RELEASE ORAL 2 TIMES DAILY
Status: DISCONTINUED | OUTPATIENT
Start: 2017-01-01 | End: 2017-01-01 | Stop reason: HOSPADM

## 2017-01-01 RX ORDER — METFORMIN HYDROCHLORIDE 500 MG/1
500 TABLET, FILM COATED, EXTENDED RELEASE ORAL EVERY EVENING
COMMUNITY
End: 2017-01-01

## 2017-01-01 RX ORDER — METOPROLOL TARTRATE 5 MG/5ML
5 INJECTION INTRAVENOUS ONCE
Status: COMPLETED | OUTPATIENT
Start: 2017-01-01 | End: 2017-01-01

## 2017-01-01 RX ORDER — KETOCONAZOLE 200 MG/1
200 TABLET ORAL 2 TIMES DAILY
Status: DISCONTINUED | OUTPATIENT
Start: 2017-01-01 | End: 2017-01-01 | Stop reason: HOSPADM

## 2017-01-01 RX ORDER — POTASSIUM CHLORIDE 20 MEQ/1
20 TABLET, EXTENDED RELEASE ORAL 2 TIMES DAILY
COMMUNITY
End: 2017-01-01

## 2017-01-01 RX ADMIN — LEVOTHYROXINE SODIUM 100 MCG: 100 TABLET ORAL at 08:33

## 2017-01-01 RX ADMIN — POTASSIUM CHLORIDE 40 MEQ: 750 TABLET, FILM COATED, EXTENDED RELEASE ORAL at 19:52

## 2017-01-01 RX ADMIN — GUAIFENESIN 100 MG: 100 SOLUTION ORAL at 08:28

## 2017-01-01 RX ADMIN — Medication 400 MG: at 17:09

## 2017-01-01 RX ADMIN — LEVOTHYROXINE SODIUM 100 MCG: 100 TABLET ORAL at 09:31

## 2017-01-01 RX ADMIN — Medication 400 MG: at 18:22

## 2017-01-01 RX ADMIN — ATORVASTATIN CALCIUM 20 MG: 10 TABLET, FILM COATED ORAL at 21:28

## 2017-01-01 RX ADMIN — FLUTICASONE FUROATE AND VILANTEROL TRIFENATATE 1 PUFF: 100; 25 POWDER RESPIRATORY (INHALATION) at 08:53

## 2017-01-01 RX ADMIN — Medication 400 MG: at 08:50

## 2017-01-01 RX ADMIN — GUAIFENESIN 600 MG: 600 TABLET, EXTENDED RELEASE ORAL at 00:58

## 2017-01-01 RX ADMIN — POTASSIUM CHLORIDE 40 MEQ: 750 TABLET, FILM COATED, EXTENDED RELEASE ORAL at 08:33

## 2017-01-01 RX ADMIN — Medication 400 MG: at 18:03

## 2017-01-01 RX ADMIN — Medication 10 ML: at 16:23

## 2017-01-01 RX ADMIN — LEVALBUTEROL HYDROCHLORIDE 1.25 MG: 1.25 SOLUTION RESPIRATORY (INHALATION) at 13:00

## 2017-01-01 RX ADMIN — INSULIN LISPRO 3 UNITS: 100 INJECTION, SOLUTION INTRAVENOUS; SUBCUTANEOUS at 11:40

## 2017-01-01 RX ADMIN — IPRATROPIUM BROMIDE AND ALBUTEROL SULFATE 3 ML: .5; 3 SOLUTION RESPIRATORY (INHALATION) at 23:10

## 2017-01-01 RX ADMIN — ASPIRIN 81 MG 81 MG: 81 TABLET ORAL at 09:31

## 2017-01-01 RX ADMIN — SODIUM CHLORIDE 500 ML: 900 INJECTION, SOLUTION INTRAVENOUS at 00:01

## 2017-01-01 RX ADMIN — Medication 10 ML: at 06:20

## 2017-01-01 RX ADMIN — METHYLPREDNISOLONE SODIUM SUCCINATE 40 MG: 40 INJECTION, POWDER, FOR SOLUTION INTRAMUSCULAR; INTRAVENOUS at 14:48

## 2017-01-01 RX ADMIN — ASPIRIN 81 MG 81 MG: 81 TABLET ORAL at 08:32

## 2017-01-01 RX ADMIN — Medication 400 MG: at 09:01

## 2017-01-01 RX ADMIN — IPRATROPIUM BROMIDE AND ALBUTEROL SULFATE 3 ML: .5; 3 SOLUTION RESPIRATORY (INHALATION) at 07:10

## 2017-01-01 RX ADMIN — MORPHINE SULFATE 1 MG: 2 INJECTION, SOLUTION INTRAMUSCULAR; INTRAVENOUS at 18:18

## 2017-01-01 RX ADMIN — SODIUM BICARBONATE 50 MEQ: 84 INJECTION, SOLUTION INTRAVENOUS at 04:26

## 2017-01-01 RX ADMIN — Medication 10 ML: at 03:03

## 2017-01-01 RX ADMIN — FLUTICASONE FUROATE AND VILANTEROL TRIFENATATE 1 PUFF: 100; 25 POWDER RESPIRATORY (INHALATION) at 09:00

## 2017-01-01 RX ADMIN — Medication 400 MG: at 18:47

## 2017-01-01 RX ADMIN — Medication 10 ML: at 06:31

## 2017-01-01 RX ADMIN — SODIUM CHLORIDE 0.5 MCG/KG/HR: 900 INJECTION, SOLUTION INTRAVENOUS at 17:52

## 2017-01-01 RX ADMIN — Medication 10 ML: at 23:25

## 2017-01-01 RX ADMIN — Medication 50 MEQ: at 04:26

## 2017-01-01 RX ADMIN — POTASSIUM CHLORIDE 30 MEQ: 750 TABLET, FILM COATED, EXTENDED RELEASE ORAL at 23:40

## 2017-01-01 RX ADMIN — POTASSIUM CHLORIDE 10 MEQ: 10 INJECTION, SOLUTION INTRAVENOUS at 02:16

## 2017-01-01 RX ADMIN — POTASSIUM CHLORIDE 10 MEQ: 10 INJECTION, SOLUTION INTRAVENOUS at 10:14

## 2017-01-01 RX ADMIN — GUAIFENESIN 100 MG: 100 SOLUTION ORAL at 16:52

## 2017-01-01 RX ADMIN — Medication 10 ML: at 15:05

## 2017-01-01 RX ADMIN — INSULIN LISPRO 7 UNITS: 100 INJECTION, SOLUTION INTRAVENOUS; SUBCUTANEOUS at 15:19

## 2017-01-01 RX ADMIN — POTASSIUM CHLORIDE 10 MEQ: 10 INJECTION, SOLUTION INTRAVENOUS at 09:21

## 2017-01-01 RX ADMIN — CALCIUM GLUCONATE 1 G: 94 INJECTION, SOLUTION INTRAVENOUS at 02:17

## 2017-01-01 RX ADMIN — IOPAMIDOL 100 ML: 755 INJECTION, SOLUTION INTRAVENOUS at 14:16

## 2017-01-01 RX ADMIN — POTASSIUM & SODIUM PHOSPHATES POWDER PACK 280-160-250 MG 2 PACKET: 280-160-250 PACK at 22:15

## 2017-01-01 RX ADMIN — Medication 10 ML: at 06:02

## 2017-01-01 RX ADMIN — POTASSIUM CHLORIDE 10 MEQ: 10 INJECTION, SOLUTION INTRAVENOUS at 13:28

## 2017-01-01 RX ADMIN — POTASSIUM CHLORIDE 40 MEQ: 1500 TABLET, EXTENDED RELEASE ORAL at 18:03

## 2017-01-01 RX ADMIN — LEVOTHYROXINE SODIUM 88 MCG: 88 TABLET ORAL at 06:59

## 2017-01-01 RX ADMIN — SODIUM CHLORIDE 1000 ML: 900 INJECTION, SOLUTION INTRAVENOUS at 07:46

## 2017-01-01 RX ADMIN — NOREPINEPHRINE BITARTRATE 2 MCG/MIN: 1 INJECTION, SOLUTION, CONCENTRATE INTRAVENOUS at 10:00

## 2017-01-01 RX ADMIN — GUAIFENESIN 100 MG: 100 SOLUTION ORAL at 21:29

## 2017-01-01 RX ADMIN — POTASSIUM CHLORIDE 10 MEQ: 10 INJECTION, SOLUTION INTRAVENOUS at 20:18

## 2017-01-01 RX ADMIN — POTASSIUM CHLORIDE 30 MEQ: 750 TABLET, FILM COATED, EXTENDED RELEASE ORAL at 21:45

## 2017-01-01 RX ADMIN — KETOCONAZOLE 200 MG: 200 TABLET ORAL at 13:16

## 2017-01-01 RX ADMIN — IPRATROPIUM BROMIDE AND ALBUTEROL SULFATE 3 ML: .5; 3 SOLUTION RESPIRATORY (INHALATION) at 10:02

## 2017-01-01 RX ADMIN — Medication 400 MG: at 13:15

## 2017-01-01 RX ADMIN — FUROSEMIDE 40 MG: 10 INJECTION, SOLUTION INTRAMUSCULAR; INTRAVENOUS at 05:32

## 2017-01-01 RX ADMIN — VALSARTAN 320 MG: 160 TABLET ORAL at 09:01

## 2017-01-01 RX ADMIN — LEVALBUTEROL HYDROCHLORIDE 1.25 MG: 1.25 SOLUTION RESPIRATORY (INHALATION) at 08:25

## 2017-01-01 RX ADMIN — MUPIROCIN: 20 OINTMENT TOPICAL at 09:08

## 2017-01-01 RX ADMIN — Medication 10 ML: at 23:19

## 2017-01-01 RX ADMIN — IOPAMIDOL 100 ML: 612 INJECTION, SOLUTION INTRAVENOUS at 07:59

## 2017-01-01 RX ADMIN — VALSARTAN 320 MG: 80 TABLET ORAL at 11:48

## 2017-01-01 RX ADMIN — GUAIFENESIN 600 MG: 600 TABLET, EXTENDED RELEASE ORAL at 18:00

## 2017-01-01 RX ADMIN — Medication 10 ML: at 06:36

## 2017-01-01 RX ADMIN — GUAIFENESIN 100 MG: 100 SOLUTION ORAL at 22:16

## 2017-01-01 RX ADMIN — POTASSIUM CHLORIDE 10 MEQ: 200 INJECTION, SOLUTION INTRAVENOUS at 08:34

## 2017-01-01 RX ADMIN — INSULIN LISPRO 4 UNITS: 100 INJECTION, SOLUTION INTRAVENOUS; SUBCUTANEOUS at 18:27

## 2017-01-01 RX ADMIN — POTASSIUM PHOSPHATE, MONOBASIC AND POTASSIUM PHOSPHATE, DIBASIC: 224; 236 INJECTION, SOLUTION INTRAVENOUS at 07:53

## 2017-01-01 RX ADMIN — KETOCONAZOLE 200 MG: 200 TABLET ORAL at 18:22

## 2017-01-01 RX ADMIN — Medication 10 ML: at 14:00

## 2017-01-01 RX ADMIN — PANTOPRAZOLE SODIUM 40 MG: 40 TABLET, DELAYED RELEASE ORAL at 07:30

## 2017-01-01 RX ADMIN — INSULIN HUMAN 10 UNITS: 100 INJECTION, SUSPENSION SUBCUTANEOUS at 18:22

## 2017-01-01 RX ADMIN — POTASSIUM CHLORIDE 40 MEQ: 750 TABLET, FILM COATED, EXTENDED RELEASE ORAL at 22:14

## 2017-01-01 RX ADMIN — SODIUM CHLORIDE AND POTASSIUM CHLORIDE: 9; 2.98 INJECTION, SOLUTION INTRAVENOUS at 01:43

## 2017-01-01 RX ADMIN — HYDRALAZINE HYDROCHLORIDE 10 MG: 20 INJECTION INTRAMUSCULAR; INTRAVENOUS at 16:45

## 2017-01-01 RX ADMIN — DEXTROSE MONOHYDRATE 15 MG/HR: 50 INJECTION, SOLUTION INTRAVENOUS at 04:27

## 2017-01-01 RX ADMIN — FENTANYL CITRATE 25 MCG: 50 INJECTION, SOLUTION INTRAMUSCULAR; INTRAVENOUS at 10:17

## 2017-01-01 RX ADMIN — DILTIAZEM HYDROCHLORIDE 10 MG: 5 INJECTION INTRAVENOUS at 05:13

## 2017-01-01 RX ADMIN — INSULIN LISPRO 2 UNITS: 100 INJECTION, SOLUTION INTRAVENOUS; SUBCUTANEOUS at 07:47

## 2017-01-01 RX ADMIN — SPIRONOLACTONE 50 MG: 25 TABLET, FILM COATED ORAL at 18:18

## 2017-01-01 RX ADMIN — GUAIFENESIN 600 MG: 600 TABLET, EXTENDED RELEASE ORAL at 18:18

## 2017-01-01 RX ADMIN — Medication 10 ML: at 21:32

## 2017-01-01 RX ADMIN — LEVALBUTEROL HYDROCHLORIDE 1.25 MG: 1.25 SOLUTION RESPIRATORY (INHALATION) at 19:17

## 2017-01-01 RX ADMIN — INSULIN HUMAN 5 UNITS: 100 INJECTION, SUSPENSION SUBCUTANEOUS at 11:45

## 2017-01-01 RX ADMIN — SPIRONOLACTONE 50 MG: 25 TABLET, FILM COATED ORAL at 11:59

## 2017-01-01 RX ADMIN — INSULIN LISPRO 3 UNITS: 100 INJECTION, SOLUTION INTRAVENOUS; SUBCUTANEOUS at 18:13

## 2017-01-01 RX ADMIN — SODIUM CHLORIDE AND POTASSIUM CHLORIDE: 9; 2.98 INJECTION, SOLUTION INTRAVENOUS at 02:22

## 2017-01-01 RX ADMIN — Medication 10 ML: at 06:59

## 2017-01-01 RX ADMIN — Medication 10 ML: at 13:28

## 2017-01-01 RX ADMIN — SODIUM CHLORIDE 0.4 MCG/KG/HR: 900 INJECTION, SOLUTION INTRAVENOUS at 03:24

## 2017-01-01 RX ADMIN — Medication 10 ML: at 23:37

## 2017-01-01 RX ADMIN — PREDNISONE 60 MG: 20 TABLET ORAL at 21:52

## 2017-01-01 RX ADMIN — INSULIN HUMAN 5 UNITS: 100 INJECTION, SUSPENSION SUBCUTANEOUS at 21:38

## 2017-01-01 RX ADMIN — POTASSIUM CHLORIDE 30 MEQ: 750 TABLET, FILM COATED, EXTENDED RELEASE ORAL at 08:50

## 2017-01-01 RX ADMIN — LORAZEPAM 1 MG: 2 INJECTION INTRAMUSCULAR; INTRAVENOUS at 06:16

## 2017-01-01 RX ADMIN — INSULIN LISPRO 10 UNITS: 100 INJECTION, SOLUTION INTRAVENOUS; SUBCUTANEOUS at 11:24

## 2017-01-01 RX ADMIN — SODIUM CHLORIDE AND POTASSIUM CHLORIDE: 9; 2.98 INJECTION, SOLUTION INTRAVENOUS at 08:38

## 2017-01-01 RX ADMIN — Medication 10 ML: at 23:02

## 2017-01-01 RX ADMIN — IPRATROPIUM BROMIDE AND ALBUTEROL SULFATE 3 ML: .5; 3 SOLUTION RESPIRATORY (INHALATION) at 16:51

## 2017-01-01 RX ADMIN — SODIUM CHLORIDE AND POTASSIUM CHLORIDE: 9; 2.98 INJECTION, SOLUTION INTRAVENOUS at 18:21

## 2017-01-01 RX ADMIN — POTASSIUM CHLORIDE 30 MEQ: 750 TABLET, FILM COATED, EXTENDED RELEASE ORAL at 16:43

## 2017-01-01 RX ADMIN — SODIUM CHLORIDE 50 ML/HR: 900 INJECTION, SOLUTION INTRAVENOUS at 14:17

## 2017-01-01 RX ADMIN — INSULIN HUMAN 10 UNITS: 100 INJECTION, SUSPENSION SUBCUTANEOUS at 16:44

## 2017-01-01 RX ADMIN — LEVOTHYROXINE SODIUM 88 MCG: 88 TABLET ORAL at 07:30

## 2017-01-01 RX ADMIN — INSULIN HUMAN 10 UNITS: 100 INJECTION, SUSPENSION SUBCUTANEOUS at 18:13

## 2017-01-01 RX ADMIN — Medication 10 ML: at 12:11

## 2017-01-01 RX ADMIN — MUPIROCIN: 20 OINTMENT TOPICAL at 21:43

## 2017-01-01 RX ADMIN — POTASSIUM CHLORIDE 10 MEQ: 10 INJECTION, SOLUTION INTRAVENOUS at 19:00

## 2017-01-01 RX ADMIN — FLUTICASONE FUROATE AND VILANTEROL TRIFENATATE 1 PUFF: 100; 25 POWDER RESPIRATORY (INHALATION) at 13:21

## 2017-01-01 RX ADMIN — POTASSIUM CHLORIDE 10 MEQ: 10 INJECTION, SOLUTION INTRAVENOUS at 09:01

## 2017-01-01 RX ADMIN — INSULIN LISPRO 2 UNITS: 100 INJECTION, SOLUTION INTRAVENOUS; SUBCUTANEOUS at 08:27

## 2017-01-01 RX ADMIN — DEXTROSE MONOHYDRATE 5 MG/HR: 50 INJECTION, SOLUTION INTRAVENOUS at 04:08

## 2017-01-01 RX ADMIN — POTASSIUM CHLORIDE 10 MEQ: 10 INJECTION, SOLUTION INTRAVENOUS at 00:39

## 2017-01-01 RX ADMIN — Medication 10 ML: at 03:19

## 2017-01-01 RX ADMIN — PREDNISONE 10 MG: 5 TABLET ORAL at 08:31

## 2017-01-01 RX ADMIN — POTASSIUM CHLORIDE 10 MEQ: 10 INJECTION, SOLUTION INTRAVENOUS at 06:58

## 2017-01-01 RX ADMIN — KETOCONAZOLE 200 MG: 200 TABLET ORAL at 08:50

## 2017-01-01 RX ADMIN — POTASSIUM CHLORIDE 10 MEQ: 10 INJECTION, SOLUTION INTRAVENOUS at 01:04

## 2017-01-01 RX ADMIN — MORPHINE SULFATE 1 MG: 2 INJECTION, SOLUTION INTRAMUSCULAR; INTRAVENOUS at 12:15

## 2017-01-01 RX ADMIN — INSULIN HUMAN 10 UNITS: 100 INJECTION, SUSPENSION SUBCUTANEOUS at 09:08

## 2017-01-01 RX ADMIN — INSULIN LISPRO 2 UNITS: 100 INJECTION, SOLUTION INTRAVENOUS; SUBCUTANEOUS at 12:00

## 2017-01-01 RX ADMIN — VALSARTAN 320 MG: 160 TABLET ORAL at 09:19

## 2017-01-01 RX ADMIN — Medication 10 ML: at 13:22

## 2017-01-01 RX ADMIN — METHYLPREDNISOLONE SODIUM SUCCINATE 40 MG: 40 INJECTION, POWDER, FOR SOLUTION INTRAMUSCULAR; INTRAVENOUS at 21:40

## 2017-01-01 RX ADMIN — LEVOTHYROXINE SODIUM 100 MCG: 100 TABLET ORAL at 07:50

## 2017-01-01 RX ADMIN — CEFEPIME HYDROCHLORIDE 2 G: 2 INJECTION, POWDER, FOR SOLUTION INTRAVENOUS at 23:12

## 2017-01-01 RX ADMIN — POTASSIUM CHLORIDE 30 MEQ: 750 TABLET, FILM COATED, EXTENDED RELEASE ORAL at 13:13

## 2017-01-01 RX ADMIN — SPIRONOLACTONE 50 MG: 25 TABLET, FILM COATED ORAL at 17:26

## 2017-01-01 RX ADMIN — GUAIFENESIN 100 MG: 100 SOLUTION ORAL at 09:31

## 2017-01-01 RX ADMIN — PANTOPRAZOLE SODIUM 40 MG: 40 TABLET, DELAYED RELEASE ORAL at 07:50

## 2017-01-01 RX ADMIN — POTASSIUM CHLORIDE 40 MEQ: 1500 TABLET, EXTENDED RELEASE ORAL at 23:34

## 2017-01-01 RX ADMIN — Medication 10 ML: at 07:57

## 2017-01-01 RX ADMIN — POTASSIUM CHLORIDE 10 MEQ: 10 INJECTION, SOLUTION INTRAVENOUS at 18:05

## 2017-01-01 RX ADMIN — INSULIN LISPRO 3 UNITS: 100 INJECTION, SOLUTION INTRAVENOUS; SUBCUTANEOUS at 17:22

## 2017-01-01 RX ADMIN — INSULIN LISPRO 2 UNITS: 100 INJECTION, SOLUTION INTRAVENOUS; SUBCUTANEOUS at 11:59

## 2017-01-01 RX ADMIN — POTASSIUM CHLORIDE 10 MEQ: 10 INJECTION, SOLUTION INTRAVENOUS at 03:30

## 2017-01-01 RX ADMIN — Medication 400 MG: at 18:12

## 2017-01-01 RX ADMIN — POTASSIUM CHLORIDE 10 MEQ: 10 INJECTION, SOLUTION INTRAVENOUS at 04:40

## 2017-01-01 RX ADMIN — INSULIN LISPRO 3 UNITS: 100 INJECTION, SOLUTION INTRAVENOUS; SUBCUTANEOUS at 13:16

## 2017-01-01 RX ADMIN — KETOCONAZOLE 200 MG: 200 TABLET ORAL at 18:47

## 2017-01-01 RX ADMIN — GUAIFENESIN 600 MG: 600 TABLET, EXTENDED RELEASE ORAL at 09:03

## 2017-01-01 RX ADMIN — SODIUM CHLORIDE 0.5 MCG/KG/HR: 900 INJECTION, SOLUTION INTRAVENOUS at 09:07

## 2017-01-01 RX ADMIN — POTASSIUM CHLORIDE 10 MEQ: 10 INJECTION, SOLUTION INTRAVENOUS at 11:27

## 2017-01-01 RX ADMIN — MIDAZOLAM HYDROCHLORIDE 2 MG: 1 INJECTION INTRAMUSCULAR; INTRAVENOUS at 09:59

## 2017-01-01 RX ADMIN — SODIUM CHLORIDE 1000 ML: 900 INJECTION, SOLUTION INTRAVENOUS at 11:42

## 2017-01-01 RX ADMIN — POTASSIUM CHLORIDE 40 MEQ: 750 TABLET, FILM COATED, EXTENDED RELEASE ORAL at 03:16

## 2017-01-01 RX ADMIN — POTASSIUM CHLORIDE 10 MEQ: 10 INJECTION, SOLUTION INTRAVENOUS at 01:59

## 2017-01-01 RX ADMIN — IOPAMIDOL 100 ML: 612 INJECTION, SOLUTION INTRAVENOUS at 08:00

## 2017-01-01 RX ADMIN — VALSARTAN 320 MG: 160 TABLET ORAL at 13:13

## 2017-01-01 RX ADMIN — LEVOTHYROXINE SODIUM 88 MCG: 88 TABLET ORAL at 06:31

## 2017-01-01 RX ADMIN — SODIUM BICARBONATE 50 MEQ: 84 INJECTION, SOLUTION INTRAVENOUS at 02:32

## 2017-01-01 RX ADMIN — Medication 10 ML: at 14:17

## 2017-01-01 RX ADMIN — METHYLPREDNISOLONE SODIUM SUCCINATE 40 MG: 40 INJECTION, POWDER, FOR SOLUTION INTRAMUSCULAR; INTRAVENOUS at 00:58

## 2017-01-01 RX ADMIN — POTASSIUM CHLORIDE 30 MEQ: 750 TABLET, FILM COATED, EXTENDED RELEASE ORAL at 22:57

## 2017-01-01 RX ADMIN — DIGOXIN 250 MCG: 0.25 INJECTION INTRAMUSCULAR; INTRAVENOUS at 11:15

## 2017-01-01 RX ADMIN — MORPHINE SULFATE 1 MG: 2 INJECTION, SOLUTION INTRAMUSCULAR; INTRAVENOUS at 18:49

## 2017-01-01 RX ADMIN — Medication 10 ML: at 07:59

## 2017-01-01 RX ADMIN — Medication 10 ML: at 06:37

## 2017-01-01 RX ADMIN — ASPIRIN 81 MG 81 MG: 81 TABLET ORAL at 07:51

## 2017-01-01 RX ADMIN — CEFEPIME HYDROCHLORIDE 2 G: 2 INJECTION, POWDER, FOR SOLUTION INTRAVENOUS at 11:44

## 2017-01-01 RX ADMIN — LIDOCAINE HYDROCHLORIDE 10 ML: 10 INJECTION, SOLUTION EPIDURAL; INFILTRATION; INTRACAUDAL; PERINEURAL at 12:00

## 2017-01-01 RX ADMIN — METFORMIN HYDROCHLORIDE 1000 MG: 500 TABLET, EXTENDED RELEASE ORAL at 09:49

## 2017-01-01 RX ADMIN — LORAZEPAM 1 MG: 2 INJECTION INTRAMUSCULAR at 06:16

## 2017-01-01 RX ADMIN — GUAIFENESIN 100 MG: 100 SOLUTION ORAL at 09:04

## 2017-01-01 RX ADMIN — MORPHINE SULFATE 1 MG: 2 INJECTION, SOLUTION INTRAMUSCULAR; INTRAVENOUS at 09:20

## 2017-01-01 RX ADMIN — INSULIN LISPRO 4 UNITS: 100 INJECTION, SOLUTION INTRAVENOUS; SUBCUTANEOUS at 22:57

## 2017-01-01 RX ADMIN — SODIUM BICARBONATE 50 MEQ: 84 INJECTION, SOLUTION INTRAVENOUS at 01:07

## 2017-01-01 RX ADMIN — THERA TABS 1 TABLET: TAB at 09:19

## 2017-01-01 RX ADMIN — SODIUM CHLORIDE AND POTASSIUM CHLORIDE: 9; 2.98 INJECTION, SOLUTION INTRAVENOUS at 11:51

## 2017-01-01 RX ADMIN — THERA TABS 1 TABLET: TAB at 08:50

## 2017-01-01 RX ADMIN — IPRATROPIUM BROMIDE AND ALBUTEROL SULFATE 3 ML: .5; 3 SOLUTION RESPIRATORY (INHALATION) at 01:58

## 2017-01-01 RX ADMIN — Medication 10 ML: at 22:26

## 2017-01-01 RX ADMIN — BUMETANIDE 0.5 MG: 0.25 INJECTION INTRAMUSCULAR; INTRAVENOUS at 14:43

## 2017-01-01 RX ADMIN — POTASSIUM CHLORIDE 10 MEQ: 10 INJECTION, SOLUTION INTRAVENOUS at 02:50

## 2017-01-01 RX ADMIN — POTASSIUM CHLORIDE 40 MEQ: 1500 TABLET, EXTENDED RELEASE ORAL at 18:18

## 2017-01-01 RX ADMIN — THERA TABS 1 TABLET: TAB at 13:13

## 2017-01-01 RX ADMIN — INSULIN HUMAN 10 UNITS: 100 INJECTION, SUSPENSION SUBCUTANEOUS at 18:04

## 2017-01-01 RX ADMIN — NOREPINEPHRINE BITARTRATE 20 MCG/MIN: 1 INJECTION, SOLUTION, CONCENTRATE INTRAVENOUS at 22:51

## 2017-01-01 RX ADMIN — POTASSIUM CHLORIDE 10 MEQ: 10 INJECTION, SOLUTION INTRAVENOUS at 23:27

## 2017-01-01 RX ADMIN — POTASSIUM CHLORIDE 40 MEQ: 1500 TABLET, EXTENDED RELEASE ORAL at 15:10

## 2017-01-01 RX ADMIN — MAGNESIUM HYDROXIDE 30 ML: 400 SUSPENSION ORAL at 18:23

## 2017-01-01 RX ADMIN — MORPHINE SULFATE 1 MG: 2 INJECTION, SOLUTION INTRAMUSCULAR; INTRAVENOUS at 04:27

## 2017-01-01 RX ADMIN — POTASSIUM & SODIUM PHOSPHATES POWDER PACK 280-160-250 MG 2 PACKET: 280-160-250 PACK at 17:27

## 2017-01-01 RX ADMIN — POTASSIUM CHLORIDE 30 MEQ: 750 TABLET, FILM COATED, EXTENDED RELEASE ORAL at 17:09

## 2017-01-01 RX ADMIN — KETOCONAZOLE 200 MG: 200 TABLET ORAL at 18:12

## 2017-01-01 RX ADMIN — POTASSIUM CHLORIDE 40 MEQ: 750 TABLET, FILM COATED, EXTENDED RELEASE ORAL at 09:31

## 2017-01-01 RX ADMIN — AMIODARONE HYDROCHLORIDE 150 MG: 50 INJECTION, SOLUTION INTRAVENOUS at 06:11

## 2017-01-01 RX ADMIN — GUAIFENESIN 600 MG: 600 TABLET, EXTENDED RELEASE ORAL at 09:31

## 2017-01-01 RX ADMIN — INSULIN LISPRO 3 UNITS: 100 INJECTION, SOLUTION INTRAVENOUS; SUBCUTANEOUS at 18:19

## 2017-01-01 RX ADMIN — ATORVASTATIN CALCIUM 20 MG: 10 TABLET, FILM COATED ORAL at 22:16

## 2017-01-01 RX ADMIN — PANTOPRAZOLE SODIUM 40 MG: 40 TABLET, DELAYED RELEASE ORAL at 08:33

## 2017-01-01 RX ADMIN — POTASSIUM CHLORIDE 10 MEQ: 10 INJECTION, SOLUTION INTRAVENOUS at 08:53

## 2017-01-01 RX ADMIN — LEVALBUTEROL HYDROCHLORIDE 1.25 MG: 1.25 SOLUTION RESPIRATORY (INHALATION) at 01:36

## 2017-01-01 RX ADMIN — PREDNISONE 10 MG: 5 TABLET ORAL at 09:03

## 2017-01-01 RX ADMIN — ATORVASTATIN CALCIUM 20 MG: 10 TABLET, FILM COATED ORAL at 01:44

## 2017-01-01 RX ADMIN — PANTOPRAZOLE SODIUM 40 MG: 40 TABLET, DELAYED RELEASE ORAL at 06:31

## 2017-01-01 RX ADMIN — POTASSIUM & SODIUM PHOSPHATES POWDER PACK 280-160-250 MG 2 PACKET: 280-160-250 PACK at 11:59

## 2017-01-01 RX ADMIN — IPRATROPIUM BROMIDE AND ALBUTEROL SULFATE 3 ML: .5; 3 SOLUTION RESPIRATORY (INHALATION) at 21:53

## 2017-01-01 RX ADMIN — Medication 5 ML: at 06:00

## 2017-01-01 RX ADMIN — INSULIN LISPRO 7 UNITS: 100 INJECTION, SOLUTION INTRAVENOUS; SUBCUTANEOUS at 12:26

## 2017-01-01 RX ADMIN — POTASSIUM CHLORIDE 40 MEQ: 750 TABLET, FILM COATED, EXTENDED RELEASE ORAL at 16:51

## 2017-01-01 RX ADMIN — INSULIN LISPRO 3 UNITS: 100 INJECTION, SOLUTION INTRAVENOUS; SUBCUTANEOUS at 08:50

## 2017-01-01 RX ADMIN — POTASSIUM CHLORIDE 30 MEQ: 750 TABLET, FILM COATED, EXTENDED RELEASE ORAL at 18:16

## 2017-01-01 RX ADMIN — POTASSIUM CHLORIDE 30 MEQ: 750 TABLET, FILM COATED, EXTENDED RELEASE ORAL at 23:16

## 2017-01-01 RX ADMIN — FUROSEMIDE 60 MG: 10 INJECTION, SOLUTION INTRAMUSCULAR; INTRAVENOUS at 06:15

## 2017-01-01 RX ADMIN — Medication 10 ML: at 06:39

## 2017-01-01 RX ADMIN — POTASSIUM CHLORIDE 40 MEQ: 1500 TABLET, EXTENDED RELEASE ORAL at 12:12

## 2017-01-01 RX ADMIN — POTASSIUM CHLORIDE 10 MEQ: 10 INJECTION, SOLUTION INTRAVENOUS at 10:31

## 2017-01-01 RX ADMIN — CETIRIZINE HYDROCHLORIDE 10 MG: 10 TABLET, FILM COATED ORAL at 21:51

## 2017-01-01 RX ADMIN — BUMETANIDE 2 MG: 0.25 INJECTION, SOLUTION INTRAMUSCULAR; INTRAVENOUS at 21:49

## 2017-01-01 RX ADMIN — INSULIN HUMAN 10 UNITS: 100 INJECTION, SUSPENSION SUBCUTANEOUS at 08:50

## 2017-01-01 RX ADMIN — PANTOPRAZOLE SODIUM 40 MG: 40 TABLET, DELAYED RELEASE ORAL at 06:59

## 2017-01-01 RX ADMIN — PANTOPRAZOLE SODIUM 40 MG: 40 TABLET, DELAYED RELEASE ORAL at 09:30

## 2017-01-01 RX ADMIN — IPRATROPIUM BROMIDE AND ALBUTEROL SULFATE 3 ML: .5; 3 SOLUTION RESPIRATORY (INHALATION) at 09:35

## 2017-01-01 RX ADMIN — POTASSIUM CHLORIDE 10 MEQ: 10 INJECTION, SOLUTION INTRAVENOUS at 16:22

## 2017-01-01 RX ADMIN — INSULIN LISPRO 3 UNITS: 100 INJECTION, SOLUTION INTRAVENOUS; SUBCUTANEOUS at 07:30

## 2017-01-01 RX ADMIN — GUAIFENESIN 600 MG: 600 TABLET, EXTENDED RELEASE ORAL at 08:29

## 2017-01-01 RX ADMIN — VALSARTAN 320 MG: 160 TABLET ORAL at 08:50

## 2017-01-01 RX ADMIN — POTASSIUM CHLORIDE 40 MEQ: 750 TABLET, FILM COATED, EXTENDED RELEASE ORAL at 11:27

## 2017-01-01 RX ADMIN — INSULIN LISPRO 3 UNITS: 100 INJECTION, SOLUTION INTRAVENOUS; SUBCUTANEOUS at 23:19

## 2017-01-01 RX ADMIN — Medication 10 ML: at 21:41

## 2017-01-01 RX ADMIN — POTASSIUM CHLORIDE 30 MEQ: 750 TABLET, FILM COATED, EXTENDED RELEASE ORAL at 09:01

## 2017-01-01 RX ADMIN — CALCIUM CHLORIDE 1 G: 100 INJECTION INTRAVENOUS; INTRAVENTRICULAR at 00:43

## 2017-01-01 RX ADMIN — METHYLPREDNISOLONE SODIUM SUCCINATE 40 MG: 40 INJECTION, POWDER, FOR SOLUTION INTRAMUSCULAR; INTRAVENOUS at 05:05

## 2017-01-01 RX ADMIN — GUAIFENESIN 100 MG: 100 SOLUTION ORAL at 15:11

## 2017-01-01 RX ADMIN — INSULIN LISPRO 4 UNITS: 100 INJECTION, SOLUTION INTRAVENOUS; SUBCUTANEOUS at 18:03

## 2017-01-01 RX ADMIN — Medication 10 ML: at 21:37

## 2017-01-01 RX ADMIN — SODIUM CHLORIDE AND POTASSIUM CHLORIDE: 9; 2.98 INJECTION, SOLUTION INTRAVENOUS at 23:11

## 2017-01-01 RX ADMIN — NOREPINEPHRINE BITARTRATE 30 MCG/MIN: 1 INJECTION, SOLUTION, CONCENTRATE INTRAVENOUS at 04:27

## 2017-01-01 RX ADMIN — SPIRONOLACTONE 50 MG: 25 TABLET, FILM COATED ORAL at 09:04

## 2017-01-01 RX ADMIN — INSULIN LISPRO 7 UNITS: 100 INJECTION, SOLUTION INTRAVENOUS; SUBCUTANEOUS at 16:43

## 2017-01-01 RX ADMIN — AMIODARONE HYDROCHLORIDE 1 MG/MIN: 50 INJECTION, SOLUTION INTRAVENOUS at 08:45

## 2017-01-01 RX ADMIN — THERA TABS 1 TABLET: TAB at 09:01

## 2017-01-01 RX ADMIN — POTASSIUM CHLORIDE 30 MEQ: 750 TABLET, FILM COATED, EXTENDED RELEASE ORAL at 18:22

## 2017-01-01 RX ADMIN — MORPHINE SULFATE 1 MG: 2 INJECTION, SOLUTION INTRAMUSCULAR; INTRAVENOUS at 22:50

## 2017-01-01 RX ADMIN — IPRATROPIUM BROMIDE AND ALBUTEROL SULFATE 3 ML: .5; 3 SOLUTION RESPIRATORY (INHALATION) at 23:11

## 2017-01-01 RX ADMIN — DEXTROSE MONOHYDRATE 25 G: 25 INJECTION, SOLUTION INTRAVENOUS at 05:05

## 2017-01-01 RX ADMIN — MORPHINE SULFATE 1 MG: 2 INJECTION, SOLUTION INTRAMUSCULAR; INTRAVENOUS at 12:36

## 2017-01-01 RX ADMIN — PREDNISONE 10 MG: 5 TABLET ORAL at 09:31

## 2017-01-01 RX ADMIN — Medication 10 ML: at 22:23

## 2017-01-01 RX ADMIN — DOCUSATE SODIUM AND SENNOSIDES 1 TABLET: 8.6; 5 TABLET, FILM COATED ORAL at 03:19

## 2017-01-01 RX ADMIN — POTASSIUM CHLORIDE 10 MEQ: 10 INJECTION, SOLUTION INTRAVENOUS at 11:46

## 2017-01-01 RX ADMIN — SODIUM BICARBONATE: 84 INJECTION, SOLUTION INTRAVENOUS at 01:23

## 2017-01-01 RX ADMIN — INSULIN HUMAN 10 UNITS: 100 INJECTION, SUSPENSION SUBCUTANEOUS at 18:03

## 2017-01-01 RX ADMIN — POTASSIUM CHLORIDE 10 MEQ: 10 INJECTION, SOLUTION INTRAVENOUS at 04:10

## 2017-01-01 RX ADMIN — SPIRONOLACTONE 50 MG: 25 TABLET, FILM COATED ORAL at 08:32

## 2017-01-01 RX ADMIN — FENTANYL CITRATE 50 MCG: 50 INJECTION, SOLUTION INTRAMUSCULAR; INTRAVENOUS at 10:38

## 2017-01-01 RX ADMIN — SODIUM CHLORIDE 50 ML/HR: 900 INJECTION, SOLUTION INTRAVENOUS at 07:59

## 2017-01-01 RX ADMIN — METOPROLOL TARTRATE 5 MG: 5 INJECTION INTRAVENOUS at 03:32

## 2017-01-01 RX ADMIN — Medication 400 MG: at 09:22

## 2017-09-11 NOTE — ED NOTES
Dr. Deyvi Lou gave and reviewed discharge instructions with the patient. The patient verbalized understanding. The patient was given opportunity for questions. Patient discharged in stable condition to the waiting room with male visitor.

## 2017-09-11 NOTE — DISCHARGE INSTRUCTIONS
COPD Exacerbation Plan: Care Instructions  Your Care Instructions  If you have chronic obstructive pulmonary disease (COPD), your usual shortness of breath could suddenly get worse. You may start coughing more and have more mucus. This flare-up is called a COPD exacerbation (say \"lt-XRI-dd-BAY-carmen\"). A lung infection or air pollution could set off an exacerbation. Sometimes it can happen after a quick change in temperature or being around chemicals. Work with your doctor to make a plan for dealing with an exacerbation. You can better manage it if you plan ahead. Follow-up care is a key part of your treatment and safety. Be sure to make and go to all appointments, and call your doctor if you are having problems. It's also a good idea to know your test results and keep a list of the medicines you take. How can you care for yourself at home? During an exacerbation  · Do not panic if you start to have one. Quick treatment at home may help you prevent serious breathing problems. If you have a COPD exacerbation plan that you developed with your doctor, follow it. · Take your medicines exactly as your doctor tells you. ¨ Use your inhaler as directed by your doctor. If your symptoms do not get better after you use your medicine, have someone take you to the emergency room. Call an ambulance if necessary. ¨ With inhaled medicines, a spacer or a nebulizer may help you get more medicine to your lungs. Ask your doctor or pharmacist how to use them properly. Practice using the spacer in front of a mirror before you have an exacerbation. This may help you get the medicine into your lungs quickly. ¨ If your doctor has given you steroid pills, take them as directed. ¨ Your doctor may have given you a prescription for antibiotics, which you can fill if you need it. ¨ Talk to your doctor if you have any problems with your medicine. And call your doctor if you have to use your antibiotic or steroid pills.   Preventing an exacerbation  · Do not smoke. This is the most important step you can take to prevent more damage to your lungs and prevent problems. If you already smoke, it is never too late to stop. If you need help quitting, talk to your doctor about stop-smoking programs and medicines. These can increase your chances of quitting for good. · Take your daily medicines as prescribed. · Avoid colds and flu. ¨ Get a pneumococcal vaccine. ¨ Get a flu vaccine each year, as soon as it is available. Ask those you live or work with to do the same, so they will not get the flu and infect you. ¨ Try to stay away from people with colds or the flu. ¨ Wash your hands often. · Avoid secondhand smoke; air pollution; cold, dry air; hot, humid air; and high altitudes. Stay at home with your windows closed when air pollution is bad. · Learn breathing techniques for COPD, such as breathing through pursed lips. These techniques can help you breathe easier during an exacerbation. When should you call for help? Call 911 anytime you think you may need emergency care. For example, call if:  · You have severe trouble breathing. · You have severe chest pain. Call your doctor now or seek immediate medical care if:  · You have new or worse shortness of breath. · You develop new chest pain. · You are coughing more deeply or more often, especially if you notice more mucus or a change in the color of your mucus. · You cough up blood. · You have new or increased swelling in your legs or belly. · You have a fever. Watch closely for changes in your health, and be sure to contact your doctor if:  · You need to use your antibiotic or steroid pills. · Your symptoms are getting worse. Where can you learn more? Go to http://jacqueline-rossana.info/. Enter F386 in the search box to learn more about \"COPD Exacerbation Plan: Care Instructions. \"  Current as of: March 25, 2017  Content Version: 11.3  © 9581-4390 [employer]. Care instructions adapted under license by [employer] (which disclaims liability or warranty for this information). If you have questions about a medical condition or this instruction, always ask your healthcare professional. [address] any warranty or liability for your use of this information.

## 2017-09-11 NOTE — ED NOTES
Assumed care of pt from triage. Pt presents to ED with chief complaint of cough x 2 weeks. Pt reports having a worsening cough after cutting grass today. Pt reports having bronchitis x 2 weeks. Pt reports smoking occasionally 5 cigarettes/day. Pt is A&O x 4. Pt denies any other symptoms at this time. Pt resting comfortably on the stretcher in a position of comfort. Pt in no acute distress at this time. Call bell within reach. Side rails x 2. Cardiac monitor x 2. Stretcher locked in the lowest position. Pt aware of plan to await for MD/PA-C/NP assessment, and pt/family verbalizes understanding. Will continue to monitor.

## 2017-09-11 NOTE — ED PROVIDER NOTES
HPI Comments: Eduardo Pavon is a 61 y.o. female with PMhx significant for Bronchitis, COPD, HTN, and DM who presents ambulatory to the ED with c/o progressively, worsening, productive cough from bronchitis x 2 weeks. The pt states the cough was exacerbated yesterday after cutting the grass outside. She notes associated right-sided chest tightness along with the cough. She denies taking any nebulizer treatments at home for relief of symptoms. Pt specifically denies any fever, congestion, chest pain, abdominal pain, nausea, vomiting, or diarrhea. Endocrinologist: Harjeet Romero   PCP: Patricia Biswas MD    Social history significant for: (.75PPD) + Tobacco, - EtOH, - Illicit Drug Use    There are no other complaints, changes, or physical findings at this time. The history is provided by the patient. No  was used. Past Medical History:   Diagnosis Date    Bronchitis     Diabetes ([facility] [address]     Endocrine disease     Hypertension     Other ill-defined conditions     high cholesterol       Past Surgical History:   Procedure Laterality Date    HX HEENT      tonsil    HX HEENT      eye ball decompression    HX ORTHOPAEDIC      carpal tunnel         History reviewed. No pertinent family history. Social History     Social History    Marital status:      Spouse name: N/A    Number of children: N/A    Years of education: N/A     Occupational History    Not on file. Social History Main Topics    Smoking status: Current Every Day Smoker     Packs/day: 1.00    Smokeless tobacco: Not on file    Alcohol use Yes      Comment: socially    Drug use: No    Sexual activity: Yes     Other Topics Concern    Not on file     Social History Narrative         ALLERGIES: Codeine and Darvocet a500 [propoxyphene n-acetaminophen]    Review of Systems   Constitutional: Negative. Negative for fever. Eyes: Negative. Respiratory: Positive for cough and chest tightness.  Negative for shortness of breath. Cardiovascular: Negative for chest pain. Gastrointestinal: Negative for abdominal pain, nausea and vomiting. Endocrine: Negative. Genitourinary: Negative. Negative for difficulty urinating, dysuria and hematuria. Musculoskeletal: Negative. Skin: Negative. Allergic/Immunologic: Negative. Neurological: Negative. Psychiatric/Behavioral: Negative for suicidal ideas. All other systems reviewed and are negative. Vitals:    09/10/17 2043 09/10/17 2100 09/10/17 2130 09/10/17 2200   BP:  171/87 (!) 165/95 163/76   Pulse:       Resp:       Temp:       SpO2: 97% 97% 96% 92%   Weight:       Height:                Physical Exam   Constitutional: She is oriented to person, place, and time. She appears well-developed and well-nourished. No distress. HENT:   Head: Normocephalic and atraumatic. Nose: Nose normal.   Eyes: Conjunctivae and EOM are normal. No scleral icterus. Neck: Normal range of motion. No tracheal deviation present. Cardiovascular: Normal rate, regular rhythm, normal heart sounds and intact distal pulses. Exam reveals no friction rub. No murmur heard. Pulmonary/Chest: Effort normal and breath sounds normal. No stridor. No respiratory distress. She has no wheezes. She has no rales. Speaking in full, unlabored sentences   Abdominal: Soft. Bowel sounds are normal. She exhibits no distension. There is no tenderness. There is no rebound. Musculoskeletal: Normal range of motion. She exhibits no tenderness. Neurological: She is alert and oriented to person, place, and time. No cranial nerve deficit. Skin: Skin is warm and dry. No rash noted. She is not diaphoretic. Psychiatric: She has a normal mood and affect. Her speech is normal and behavior is normal. Judgment and thought content normal. Cognition and memory are normal.   Nursing note and vitals reviewed.        MDM  Number of Diagnoses or Management Options  Acute exacerbation of chronic obstructive pulmonary disease (COPD) ([facility]):   Diagnosis management comments: DDX:  Copd exacerbation, acute bronchitis, pna    Plan:  Cxr, neb, zyrtec, steroids    Impression:  Acute exacerbation of copd         Amount and/or Complexity of Data Reviewed  Clinical lab tests: ordered and reviewed  Tests in the radiology section of CPT®: ordered and reviewed      ED Course       Procedures    I reviewed our electronic medical record system for any past medical records that were available that may contribute to the patients current condition, the nursing notes and and vital signs from today's visit    Nursing notes will be reviewed as they become available in realtime while the pt has been in the ED. Dustin Cortez MD    I have spent 3-7 minutes discussing the medical risks of prolonged smoking habits and advised the patient of the benefits of the cessation of smoking, providing specific suggestions on how to quit. Dustin Cortez MD    9:31 PM  Pt's rate is 95 with a 18 rhythm as noted on Cardiac monitor. SpO2 is 98, on (RA)    I personally reviewed pt's imaging. Official read by radiology listed below. Dustin Cortez MD    10:29 PM  Progress note:  Pt noted to be feeling better after neb and steroids, ready for discharge. Discussed imaging findings with pt and/or family. Will follow up as instructed. All questions have been answered, pt voiced understanding and agreement with plan. If narcotics were prescribed, pt was advised not to drive or operate heavy machinery. If abx were prescribed, pt advised that diarrhea and rash are possible side effects of the medications. Specific return precautions provided as well as instructions to return to the ED should sx worsen at any time. Dustin Cortez MD    LABORATORY TESTS:  No results found for this or any previous visit (from the past 12 hour(s)).     IMAGING RESULTS:  XR CHEST PA LAT   Final Result   INDICATION: . cough  Additional history: Productive cough x2-3 weeks  COMPARISON: Previous chest xray, 12/22/2012. Artist Hazy FINDINGS: PA and lateral view of the chest.   .  Lines/tubes/surgical: None. Heart/mediastinum: Calcifications in the aortic arch. Lungs/pleura:  No focal consolidation or mass. No visualized pleural effusion or  pneumothorax. Additional Comments: None. Artist Hazy IMPRESSION  IMPRESSION:  1. No radiographic evidence of acute cardiopulmonary disease. MEDICATIONS GIVEN:  Medications   albuterol-ipratropium (DUO-NEB) 2.5 MG-0.5 MG/3 ML (3 mL Nebulization Given 9/10/17 2153)   predniSONE (DELTASONE) tablet 60 mg (60 mg Oral Given 9/10/17 2152)   cetirizine (ZYRTEC) tablet 10 mg (10 mg Oral Given 9/10/17 2151)       IMPRESSION:  1. Acute exacerbation of chronic obstructive pulmonary disease (COPD) ([facility] [address]        PLAN:  1. Current Discharge Medication List      START taking these medications    Details   cetirizine (ZYRTEC) 10 mg tablet Take 1 Tab by mouth daily. Qty: 20 Tab, Refills: 0      methylPREDNISolone (MEDROL, NALINI,) 4 mg tablet Take as directed on packaging  Qty: 1 Dose Pack, Refills: 0           2. Follow-up Information     Follow up With Details Comments [facility] Ele Padron MD Schedule an appointment as soon as possible for a visit in 2 days  [facility]  [phone]          Return to ED if worse     Discharge Note:  10:49 PM  The pt is ready for discharge. The pt's signs, symptoms, diagnosis, and discharge instructions have been discussed and pt has conveyed their understanding. The pt is to follow up as recommended or return to ER should their symptoms worsen. Plan has been discussed and pt is in agreement. This note is prepared by Bethel Bullock, acting as a Scribe for Tete Darling MD.    Tete Darling MD: The scribe's documentation has been prepared under my direction and personally reviewed by me in its entirety.  I confirm that the notes above accurately reflects all work, treatment, procedures, and medical decision making performed by me. This note will not be viewable in [address]

## 2017-09-15 PROBLEM — E87.6 HYPOKALEMIA: Status: ACTIVE | Noted: 2017-01-01

## 2017-09-15 NOTE — IP AVS SNAPSHOT
[address] [facility] 
[phone] Patient: Kermit Durbin MRN: [id number] SDT:[id number] Current Discharge Medication List  
  
START taking these medications Dose & Instructions Dispensing Information Comments Morning Noon Evening Bedtime  
 fluticasone-vilanterol 100-25 mcg/dose inhaler Commonly known as:  BREO ELLIPTA Your last dose was: Your next dose is:    
   
   
 Dose:  1 Puff Take 1 Puff by inhalation daily. Quantity:  1 Inhaler Refills:  0  
     
   
   
   
  
 glimepiride 1 mg tablet Commonly known as:  AMARYL Your last dose was: Your next dose is:    
   
   
 Dose:  1 mg Take 1 Tab by mouth Daily (before breakfast). Quantity:  30 Tab Refills:  0 Nebulizer & Compressor machine Your last dose was: Your next dose is: To use nebs as directed Quantity:  1 Each Refills:  0  
     
   
   
   
  
 spironolactone 50 mg tablet Commonly known as:  ALDACTONE Your last dose was: Your next dose is:    
   
   
 Dose:  50 mg Take 1 Tab by mouth daily. Quantity:  30 Tab Refills:  0  
     
   
   
   
  
 valsartan 320 mg tablet Commonly known as:  DIOVAN Your last dose was: Your next dose is:    
   
   
 Dose:  320 mg Take 1 Tab by mouth daily. Quantity:  30 Tab Refills:  0 CONTINUE these medications which have CHANGED Dose & Instructions Dispensing Information Comments Morning Noon Evening Bedtime * guaiFENesin 100 mg/5 mL liquid Commonly known as:  ROBITUSSIN What changed:  Another medication with the same name was added. Make sure you understand how and when to take each. Your last dose was: Your next dose is:    
   
   
 Dose:  100 mg Take 5 mL by mouth three (3) times daily. Quantity:  120 mL Refills:  0 * guaiFENesin  mg ER tablet Commonly known as:  [employer] What changed: You were already taking a medication with the same name, and this prescription was added. Make sure you understand how and when to take each. Your last dose was: Your next dose is:    
   
   
 Dose:  600 mg Take 1 Tab by mouth two (2) times a day for 7 days. Quantity:  14 Tab Refills:  0  
     
   
   
   
  
 * ipratropium-albuterol  mcg/actuation inhaler Commonly known as:  COMBIVENT What changed:  Another medication with the same name was added. Make sure you understand how and when to take each. Your last dose was: Your next dose is:    
   
   
 Dose:  2 Puff Take 2 Puffs by inhalation every six (6) hours as needed for Wheezing. Quantity:  1 Inhaler Refills:  1  
     
   
   
   
  
 * albuterol-ipratropium 2.5 mg-0.5 mg/3 ml Nebu Commonly known as:  Crystal  What changed: You were already taking a medication with the same name, and this prescription was added. Make sure you understand how and when to take each. Your last dose was: Your next dose is:    
   
   
 Dose:  3 mL  
3 mL by Nebulization route every four (4) hours as needed. Quantity:  100 Nebule Refills:  0  
     
   
   
   
  
 * Notice: This list has 4 medication(s) that are the same as other medications prescribed for you. Read the directions carefully, and ask your doctor or other care provider to review them with you. CONTINUE these medications which have NOT CHANGED Dose & Instructions Dispensing Information Comments Morning Noon Evening Bedtime  
 aspirin 81 mg tablet Your last dose was: Your next dose is:    
   
   
 Dose:  81 mg Take 81 mg by mouth. Refills:  0  
     
   
   
   
  
 cetirizine 10 mg tablet Commonly known as:  ZyrTEC Your last dose was:     
   
Your next dose is:    
   
   
 Dose:  10 mg  
 Take 1 Tab by mouth daily. Quantity:  20 Tab Refills:  0  
     
   
   
   
  
 CRESTOR 10 mg tablet Generic drug:  rosuvastatin Your last dose was: Your next dose is:    
   
   
 Dose:  10 mg Take 10 mg by mouth nightly. Refills:  0  
     
   
   
   
  
 omeprazole 40 mg capsule Commonly known as:  PRILOSEC Your last dose was: Your next dose is:    
   
   
 Dose:  40 mg Take 1 Cap by mouth daily. Quantity:  30 Cap Refills:  1 SYNTHROID 100 mcg tablet Generic drug:  levothyroxine Your last dose was: Your next dose is:    
   
   
 Dose:  100 mcg Take 100 mcg by mouth Daily (before breakfast). Refills:  0 STOP taking these medications   
 metFORMIN 500 mg tablet Commonly known as:  GLUCOPHAGE  
   
  
 methylPREDNISolone 4 mg tablet Commonly known as:  MEDROL (NALINI) predniSONE 5 mg tablet Commonly known as:  DELTASONE  
   
  
 valsartan-hydroCHLOROthiazide 320-12.5 mg per tablet Commonly known as:  DIOVAN-HCT Where to Get Your Medications Information on where to get these meds will be given to you by the nurse or doctor. ! Ask your nurse or doctor about these medications  
  albuterol-ipratropium 2.5 mg-0.5 mg/3 ml Nebu  
 fluticasone-vilanterol 100-25 mcg/dose inhaler  
 glimepiride 1 mg tablet  
 guaiFENesin  mg ER tablet Nebulizer & Compressor machine  
 spironolactone 50 mg tablet  
 valsartan 320 mg tablet

## 2017-09-15 NOTE — IP AVS SNAPSHOT
[address] [facility] 
[phone] Patient: Yousif Monaco MRN: [id number] DMT:7/54/4151 You are allergic to the following Allergen Reactions Codeine Nausea and Vomiting Darvocet A500 (Propoxyphene N-Acetaminophen) Nausea and Vomiting Recent Documentation Height Weight Breastfeeding? BMI OB Status Smoking Status 1.6 m 83.9 kg No 32.77 kg/m2 Postmenopausal Current Every Day Smoker Unresulted Labs Order Current Status ACTH In process ALDOSTERONE, UR In process ALDOSTERONE/RENIN ACTIVITY In process Emergency Contacts Name Discharge Info Relation Home Work Mobile Keny Gomez DISCHARGE CAREGIVER [3] Spouse [3] [phone] About your hospitalization You were admitted on:  September 15, 2017 You last received care in the:  [facility] 2 CRITICAL CARE 1 You were discharged on:  September 18, 2017 Unit phone number:  [phone] Why you were hospitalized Your primary diagnosis was:  Not on File Your diagnoses also included:  Hypokalemia Providers Seen During Your Hospitalizations Provider Role Specialty Primary office phone Adelso Reyes MD Attending Provider Emergency Medicine [phone] Dontrell Arellano MD Attending Provider Internal Medicine [phone] Shun Morris MD Attending Provider Internal Medicine [phone] Your Primary Care Physician (PCP) Primary Care Physician Office Phone Office Fax Patel Alistair [phone] Follow-up Information Follow up With Details Comments Contact Info Chuy Min MD Schedule an appointment as soon as possible for a visit in 1 week  [address] [address] 
[phone] Danilo Yap MD Schedule an appointment as soon as possible for a visit in 1 month  [address] [facility] [address] [facility] 
[phone] Nathan Mckeon MD Schedule an appointment as soon as possible for a visit in 10 days  [address] 
[address] [facility] 
[phone] Basic Metabolic Panel In 1 week With primary care physician or Dr Chasity Garcia office Current Discharge Medication List  
  
START taking these medications Dose & Instructions Dispensing Information Comments Morning Noon Evening Bedtime  
 fluticasone-vilanterol 100-25 mcg/dose inhaler Commonly known as:  BREO ELLIPTA Your last dose was: Your next dose is:    
   
   
 Dose:  1 Puff Take 1 Puff by inhalation daily. Quantity:  1 Inhaler Refills:  0  
     
   
   
   
  
 glimepiride 1 mg tablet Commonly known as:  AMARYL Your last dose was: Your next dose is:    
   
   
 Dose:  1 mg Take 1 Tab by mouth Daily (before breakfast). Quantity:  30 Tab Refills:  0 Nebulizer & Compressor machine Your last dose was: Your next dose is: To use nebs as directed Quantity:  1 Each Refills:  0  
     
   
   
   
  
 spironolactone 50 mg tablet Commonly known as:  ALDACTONE Your last dose was: Your next dose is:    
   
   
 Dose:  50 mg Take 1 Tab by mouth daily. Quantity:  30 Tab Refills:  0  
     
   
   
   
  
 valsartan 320 mg tablet Commonly known as:  DIOVAN Your last dose was: Your next dose is:    
   
   
 Dose:  320 mg Take 1 Tab by mouth daily. Quantity:  30 Tab Refills:  0 CONTINUE these medications which have CHANGED Dose & Instructions Dispensing Information Comments Morning Noon Evening Bedtime * guaiFENesin 100 mg/5 mL liquid Commonly known as:  ROBITUSSIN What changed:  Another medication with the same name was added. Make sure you understand how and when to take each. Your last dose was: Your next dose is:    
   
   
 Dose:  100 mg Take 5 mL by mouth three (3) times daily. Quantity:  120 mL Refills:  0  
     
   
   
   
  
 * guaiFENesin  mg ER tablet Commonly known as:  Allen & Allen What changed: You were already taking a medication with the same name, and this prescription was added. Make sure you understand how and when to take each. Your last dose was: Your next dose is:    
   
   
 Dose:  600 mg Take 1 Tab by mouth two (2) times a day for 7 days. Quantity:  14 Tab Refills:  0  
     
   
   
   
  
 * ipratropium-albuterol  mcg/actuation inhaler Commonly known as:  COMBIVENT What changed:  Another medication with the same name was added. Make sure you understand how and when to take each. Your last dose was: Your next dose is:    
   
   
 Dose:  2 Puff Take 2 Puffs by inhalation every six (6) hours as needed for Wheezing. Quantity:  1 Inhaler Refills:  1  
     
   
   
   
  
 * albuterol-ipratropium 2.5 mg-0.5 mg/3 ml Nebu Commonly known as:  Olivia Showers What changed: You were already taking a medication with the same name, and this prescription was added. Make sure you understand how and when to take each. Your last dose was: Your next dose is:    
   
   
 Dose:  3 mL  
3 mL by Nebulization route every four (4) hours as needed. Quantity:  100 Nebule Refills:  0  
     
   
   
   
  
 * Notice: This list has 4 medication(s) that are the same as other medications prescribed for you. Read the directions carefully, and ask your doctor or other care provider to review them with you. CONTINUE these medications which have NOT CHANGED Dose & Instructions Dispensing Information Comments Morning Noon Evening Bedtime  
 aspirin 81 mg tablet Your last dose was: Your next dose is:    
   
   
 Dose:  81 mg Take 81 mg by mouth. Refills:  0 cetirizine 10 mg tablet Commonly known as:  ZyrTEC Your last dose was: Your next dose is:    
   
   
 Dose:  10 mg Take 1 Tab by mouth daily. Quantity:  20 Tab Refills:  0  
     
   
   
   
  
 CRESTOR 10 mg tablet Generic drug:  rosuvastatin Your last dose was: Your next dose is:    
   
   
 Dose:  10 mg Take 10 mg by mouth nightly. Refills:  0  
     
   
   
   
  
 omeprazole 40 mg capsule Commonly known as:  PRILOSEC Your last dose was: Your next dose is:    
   
   
 Dose:  40 mg Take 1 Cap by mouth daily. Quantity:  30 Cap Refills:  1 SYNTHROID 100 mcg tablet Generic drug:  levothyroxine Your last dose was: Your next dose is:    
   
   
 Dose:  100 mcg Take 100 mcg by mouth Daily (before breakfast). Refills:  0 STOP taking these medications   
 metFORMIN 500 mg tablet Commonly known as:  GLUCOPHAGE  
   
  
 methylPREDNISolone 4 mg tablet Commonly known as:  MEDROL (NALINI) predniSONE 5 mg tablet Commonly known as:  DELTASONE  
   
  
 valsartan-hydroCHLOROthiazide 320-12.5 mg per tablet Commonly known as:  DIOVAN-HCT Where to Get Your Medications Information on where to get these meds will be given to you by the nurse or doctor. ! Ask your nurse or doctor about these medications  
  albuterol-ipratropium 2.5 mg-0.5 mg/3 ml Nebu  
 fluticasone-vilanterol 100-25 mcg/dose inhaler  
 glimepiride 1 mg tablet  
 guaiFENesin  mg ER tablet Nebulizer & Compressor machine  
 spironolactone 50 mg tablet  
 valsartan 320 mg tablet Discharge Instructions HOSPITALIST DISCHARGE INSTRUCTIONS 
 
NAME: Ruben Valdes :  1960 MRN:  [id number] Date/Time:  2017 11:28 AM 
 
ADMIT DATE: 9/15/2017 DISCHARGE DATE: 2017 DISCHARGE DIAGNOSIS: 
 Metabolic Alkalosis due to Severe Hypokalemia vs Contraction alkalosis with dehydration Hyponatremia Nausea and Vomiting COPD Exacerbation NIDDM Oral Thrush, 2nd to DM and Steroids HTN 
 
MEDICATIONS: 
· It is important that you take the medication exactly as they are prescribed. · Keep your medication in the bottles provided by the pharmacist and keep a list of the medication names, dosages, and times to be taken in your wallet. · Do not take other medications without consulting your doctor. Pain Management: per above medications What to do at [facility] Recommended diet:  Diabetic Diet Recommended activity: Activity as tolerated If you have questions regarding the hospital related prescriptions or hospital related issues please call [facility] at . If you experience any of the following symptoms then please call your primary care physician or return to the emergency room if you cannot get hold of your doctor: 
Fever, chills, nausea, vomiting, diarrhea, change in mentation, falling, bleeding, shortness of breath Information obtained by : 
I understand that if any problems occur once I am at home I am to contact my physician. I understand and acknowledge receipt of the instructions indicated above. Physician's or R.N.'s Signature                                                                  Date/Time [employer] Activation Thank you for requesting access to [employer]. Please follow the instructions below to securely access and download your online medical record. [employer] allows you to send messages to your doctor, view your test results, renew your prescriptions, schedule appointments, and more. How Do I Sign Up? 1. In your internet browser, go to [email or web address] 
 2. Click on the First Time User? Click Here link in the Sign In box. You will be redirect to the New Member Sign Up page. 3. Enter your [employer] Access Code exactly as it appears below. You will not need to use this code after youve completed the sign-up process. If you do not sign up before the expiration date, you must request a new code. [employer] Access Code: [email or web address] Expires: 2017  8:03 PM (This is the date your [employer] access code will ) 4. Enter the last four digits of your Social Security Number (xxxx) and Date of Birth (mm/dd/yyyy) as indicated and click Submit. You will be taken to the next sign-up page. 5. Create a MacuLogixt ID. This will be your [employer] login ID and cannot be changed, so think of one that is secure and easy to remember. 6. Create a [employer] password. You can change your password at any time. 7. Enter your Password Reset Question and Answer. This can be used at a later time if you forget your password. 8. Enter your e-mail address. You will receive e-mail notification when new information is available in [address] 9. Click Sign Up. You can now view and download portions of your medical record. 10. Click the Download Summary menu link to download a portable copy of your medical information. Additional Information If you have questions, please visit the Frequently Asked Questions section of the [employer] website at [email or web address] Remember, [employer] is NOT to be used for urgent needs. For medical emergencies, dial 911. Patient or Representative Signature                                                          Date/Time Discharge Orders None F[employer] Announcement  We are excited to announce that we are making your provider's discharge notes available to you in Konbini. You will see these notes when they are completed and signed by the physician that discharged you from your recent hospital stay. If you have any questions or concerns about any information you see in Konbini, please call the Health Information Department where you were seen or reach out to your Primary Care Provider for more information about your plan of care. Introducing [facility]! Olegario Gomez introduces Konbini patient portal. Now you can access parts of your medical record, email your doctor's office, and request medication refills online. 1. In your internet browser, go to [email or web address] 2. Click on the First Time User? Click Here link in the Sign In box. You will see the New Member Sign Up page. 3. Enter your Kon[employer]i Access Code exactly as it appears below. You will not need to use this code after youve completed the sign-up process. If you do not sign up before the expiration date, you must request a new code. · Kon[employer]i Access Code: [email or web address] Expires: 12/9/2017  8:03 PM 
 
4. Enter the last four digits of your Social Security Number (xxxx) and Date of Birth (mm/dd/yyyy) as indicated and click Submit. You will be taken to the next sign-up page. 5. Create a [employer]t ID. This will be your Konbini login ID and cannot be changed, so think of one that is secure and easy to remember. 6. Create a Kon[employer]i password. You can change your password at any time. 7. Enter your Password Reset Question and Answer. This can be used at a later time if you forget your password. 8. Enter your e-mail address. You will receive e-mail notification when new information is available in [address] 9. Click Sign Up. You can now view and download portions of your medical record. 10. Click the Download Summary menu link to download a portable copy of your medical information. If you have questions, please visit the Frequently Asked Questions section of the MyChart website. Remember, MyChart is NOT to be used for urgent needs. For medical emergencies, dial 911. Now available from your iPhone and Android! General Information Please provide this summary of care documentation to your next provider. Patient Signature:  ____________________________________________________________ Date:  ____________________________________________________________  
  
Mariel Faes Provider Signature:  ____________________________________________________________ Date:  ____________________________________________________________

## 2017-09-15 NOTE — IP AVS SNAPSHOT
Summary of Care Report The Summary of Care report has been created to help improve care coordination. Users with access to [employer] or [employer] [facility] (Web-based application) may access additional patient information including the Discharge Summary. If you are not currently a [address] user and need more information, please call the number listed below in the [place] [address] section and ask to be connected with Medical Records. Facility Information Name Address Phone [address] [address] [phone] Patient Information Patient Name Sex  Christophe Bartlett ([id number]) Female 1960 Discharge Information Admitting Provider Service Area Unit Sabi Pérez MD / [facility] 2 Critical Care  [phone] Discharge Provider Discharge Date/Time Discharge Disposition Destination (none) 2017 (Pending) AHR (none) Patient Language Language ENGLISH [13] Hospital Problems as of 2017  Reviewed: 2012  2:59 PM by Rhiannon Howell MD  
  
  
  
 Class Noted - Resolved Last Modified POA Active Problems Hypokalemia  9/15/2017 - Present 9/15/2017 by Sabi Pérez MD Unknown Entered by Sabi Pérez MD  
  
Non-Hospital Problems as of 2017  Reviewed: 2012  2:59 PM by Rhiannon Howell MD  
  
  
  
 Class Noted - Resolved Last Modified Active Problems Acute respiratory failure ([facility]  2012 - Present 2012 Entered by Helena Geronimo MD  
  Hypoxia  2012 - Present 2012 Entered by Helena Geronimo MD  
  SIRS (systemic inflammatory response syndrome) ([facility]  2012 - Present 2012 Entered by Helena Geronimo MD  
  Acute bronchitis  2012 - Present 2012   Entered by Helena Geronimo MD  
 Diabetes mellitus ([facility]  12/23/2012 - Present 12/24/2012 Entered by Luac Chandra MD  
  Hypothyroidism  12/23/2012 - Present 12/24/2012 Entered by Luca Chandra MD  
  HTN (hypertension)  12/23/2012 - Present 12/24/2012 Entered by Luca Chandra MD  
  Smoker  12/23/2012 - Present 12/24/2012 Entered by Luca Chandra MD  
  
You are allergic to the following Allergen Reactions Codeine Nausea and Vomiting Darvocet A500 (Propoxyphene N-Acetaminophen) Nausea and Vomiting Current Discharge Medication List  
  
START taking these medications Dose & Instructions Dispensing Information Comments  
 fluticasone-vilanterol 100-25 mcg/dose inhaler Commonly known as:  BREO ELLIPTA Dose:  1 Puff Take 1 Puff by inhalation daily. Quantity:  1 Inhaler Refills:  0  
   
 glimepiride 1 mg tablet Commonly known as:  AMARYL Dose:  1 mg Take 1 Tab by mouth Daily (before breakfast). Quantity:  30 Tab Refills:  0 Nebulizer & Compressor machine To use nebs as directed Quantity:  1 Each Refills:  0  
   
 spironolactone 50 mg tablet Commonly known as:  ALDACTONE Dose:  50 mg Take 1 Tab by mouth daily. Quantity:  30 Tab Refills:  0  
   
 valsartan 320 mg tablet Commonly known as:  DIOVAN Dose:  320 mg Take 1 Tab by mouth daily. Quantity:  30 Tab Refills:  0 CONTINUE these medications which have CHANGED Dose & Instructions Dispensing Information Comments * guaiFENesin 100 mg/5 mL liquid Commonly known as:  ROBITUSSIN What changed:  Another medication with the same name was added. Make sure you understand how and when to take each. Dose:  100 mg Take 5 mL by mouth three (3) times daily. Quantity:  120 mL Refills:  0  
   
 * guaiFENesin  mg ER tablet Commonly known as:  Allen & Allen What changed:   You were already taking a medication with the same name, and this prescription was added. Make sure you understand how and when to take each. Dose:  600 mg Take 1 Tab by mouth two (2) times a day for 7 days. Quantity:  14 Tab Refills:  0  
   
 * ipratropium-albuterol  mcg/actuation inhaler Commonly known as:  COMBIVENT What changed:  Another medication with the same name was added. Make sure you understand how and when to take each. Dose:  2 Puff Take 2 Puffs by inhalation every six (6) hours as needed for Wheezing. Quantity:  1 Inhaler Refills:  1  
   
 * albuterol-ipratropium 2.5 mg-0.5 mg/3 ml Nebu Commonly known as:  Adelita Villarreal What changed: You were already taking a medication with the same name, and this prescription was added. Make sure you understand how and when to take each. Dose:  3 mL  
3 mL by Nebulization route every four (4) hours as needed. Quantity:  100 Nebule Refills:  0  
   
 * Notice: This list has 4 medication(s) that are the same as other medications prescribed for you. Read the directions carefully, and ask your doctor or other care provider to review them with you. CONTINUE these medications which have NOT CHANGED Dose & Instructions Dispensing Information Comments  
 aspirin 81 mg tablet Dose:  81 mg Take 81 mg by mouth. Refills:  0  
   
 cetirizine 10 mg tablet Commonly known as:  ZyrTEC Dose:  10 mg Take 1 Tab by mouth daily. Quantity:  20 Tab Refills:  0  
   
 CRESTOR 10 mg tablet Generic drug:  rosuvastatin Dose:  10 mg Take 10 mg by mouth nightly. Refills:  0  
   
 omeprazole 40 mg capsule Commonly known as:  PRILOSEC Dose:  40 mg Take 1 Cap by mouth daily. Quantity:  30 Cap Refills:  1 SYNTHROID 100 mcg tablet Generic drug:  levothyroxine Dose:  100 mcg Take 100 mcg by mouth Daily (before breakfast). Refills:  0 STOP taking these medications Comments  
 metFORMIN 500 mg tablet Commonly known as:  GLUCOPHAGE  
   
   
 methylPREDNISolone 4 mg tablet Commonly known as:  MEDROL (NALINI) predniSONE 5 mg tablet Commonly known as:  DELTASONE  
   
   
 valsartan-hydroCHLOROthiazide 320-12.5 mg per tablet Commonly known as:  DIOVAN-HCT Follow-up Information Follow up With Details Comments Contact Info Lydia Tatum MD Schedule an appointment as soon as possible for a visit in 1 week  [address] 
[phone] Casimiro Quiroz MD Schedule an appointment as soon as possible for a visit in 1 month  [address] [facility] [address] [facility] 
[phone] Griselda Kind, MD Schedule an appointment as soon as possible for a visit in 10 days  [address]  
[address] [facility] 
[phone] Basic Metabolic Panel In 1 week With primary care physician or Dr Mary Ellen Mccain office Discharge Instructions HOSPITALIST DISCHARGE INSTRUCTIONS 
 
NAME: Genevieve Montoya :  1960 MRN:  [id number] Date/Time:  2017 11:28 AM 
 
ADMIT DATE: 9/15/2017 DISCHARGE DATE: 2017 DISCHARGE DIAGNOSIS: 
Metabolic Alkalosis due to Severe Hypokalemia vs Contraction alkalosis with dehydration Hyponatremia Nausea and Vomiting COPD Exacerbation NIDDM Oral Thrush, 2nd to DM and Steroids HTN 
 
MEDICATIONS: 
· It is important that you take the medication exactly as they are prescribed. · Keep your medication in the bottles provided by the pharmacist and keep a list of the medication names, dosages, and times to be taken in your wallet. · Do not take other medications without consulting your doctor. Pain Management: per above medications What to do at [facility] Recommended diet:  Diabetic Diet Recommended activity: Activity as tolerated If you have questions regarding the hospital related prescriptions or hospital related issues please call [facility] at . If you experience any of the following symptoms then please call your primary care physician or return to the emergency room if you cannot get hold of your doctor: 
Fever, chills, nausea, vomiting, diarrhea, change in mentation, falling, bleeding, shortness of breath Information obtained by : 
I understand that if any problems occur once I am at home I am to contact my physician. I understand and acknowledge receipt of the instructions indicated above. Physician's or R.N.'s Signature                                                                  Date/Time [employer] Activation Thank you for requesting access to [employer]. Please follow the instructions below to securely access and download your online medical record. [employer] allows you to send messages to your doctor, view your test results, renew your prescriptions, schedule appointments, and more. How Do I Sign Up? 1. In your internet browser, go to [email or web address] 
2. Click on the First Time User? Click Here link in the Sign In box. You will be redirect to the New Member Sign Up page. 3. Enter your [employer] Access Code exactly as it appears below. You will not need to use this code after youve completed the sign-up process. If you do not sign up before the expiration date, you must request a new code. [employer] Access Code: [email or web address] Expires: 2017  8:03 PM (This is the date your [employer] access code will ) 4. Enter the last four digits of your Social Security Number (xxxx) and Date of Birth (mm/dd/yyyy) as indicated and click Submit. You will be taken to the next sign-up page. 5. Create a [employer] ID.  This will be your [employer] login ID and cannot be changed, so think of one that is secure and easy to remember. 6. Create a [employer] password. You can change your password at any time. 7. Enter your Password Reset Question and Answer. This can be used at a later time if you forget your password. 8. Enter your e-mail address. You will receive e-mail notification when new information is available in [address] 9. Click Sign Up. You can now view and download portions of your medical record. 10. Click the Download Summary menu link to download a portable copy of your medical information. Additional Information If you have questions, please visit the Frequently Asked Questions section of the [employer] website at [email or web address] Remember, [employer] is NOT to be used for urgent needs. For medical emergencies, dial 911. Patient or Representative Signature                                                          Date/Time Chart Review Routing History Recipient Method Report Sent By Ayo Gomez MD  
Fax: [phone] Phone: [phone] Fax Tho Dick MD NOTES AUTO ROUTING REPORT Nicolette Christianson [phone] 9/16/2017  6:16 AM 09/16/2017 Prosper Gomez MD  
Fax: [phone] Phone: [phone] Fax Tho Dick MD NOTES AUTO ROUTING REPORT Wilfred Abel MD [[id number]] 9/18/2017 11:38 AM 09/18/2017

## 2017-09-15 NOTE — ED NOTES
After triage was complete the patient then states \"I have pressure that radiates through to my back\". EKG ordered.

## 2017-09-16 NOTE — CONSULTS
[facility] Consult Note        NAME: Filipe Mcclendon       :  1960       MRN:  [id number]     Date/Time: 2017    Risk of deterioration: low       Assessment:    Plan:  Severe hypokalemia  Hyponatremia, hypophosphatemia  N/V with 10 pound weight loss  Contraction alkalosis   Edema  Ongoing tobacco use  COPD K and Phos being repleted aggressively. Na has improved with IVF and contraction alkalosis improving with volume resuscitation  Labs/urine sent for eval of hyperaldo (but would expect normal sodium and a lower k, not < 1.9)  In interim will place on spironolactone at 50 mg bid (holding arb/hctz)   Pt had a low k in -doesn't recall having a low k by office labs in past       Asked to see for severe hypokalemia. Pt admitted with a potassium of < 1.9 and a sodium of 128. Bicarb was > 40. Pt had a 2 week hx of n/v which she attributed to her metformin adjustment. She lost 10 pounds during this time. She vomitted daily, not large amounts, just what she took in. No diarrhea-had first bm today in 5 days. Smoke 3 cigs/day and vapor cigs once. No recent imaging studies. Up to date with colonoscopy-bev, has hx of polpyectomy (benign) and diverticulosis. Subjective:     Chief Complaint:  How long has my potassium been low? Review of Systems: General: postive for weakness, fatigue  Respiratory:  No cough, sputum production  Cardiology:  No cp, gibbons  Gastrointestinal: n/v now resolved, no diarrhea, change in bowel habits  Genitourinary: no dysuria, hematuria  Review of systems otherwise negative    Objective:     VITALS:   Last 24hrs VS reviewed since prior progress note.  Most recent are:  Visit Vitals    /80    Pulse 84    Temp 98.3 °F (36.8 °C)    Resp 19    Ht 5' 3\" (1.6 m)    Wt 83.9 kg (184 lb 15.5 oz)    SpO2 96%    Breastfeeding No    BMI 32.77 kg/m2     SpO2 Readings from Last 6 Encounters:   17 96%   09/10/17 95%   12 93%    O2 Flow Rate (L/min): 2 l/min Intake/Output Summary (Last 24 hours) at 09/16/17 1223  Last data filed at 09/16/17 1217   Gross per 24 hour   Intake          1396.66 ml   Output             1152 ml   Net           244.66 ml        Telemetry Reviewed       PHYSICAL EXAM:    General   well developed, well nourished, appears stated age, in no acute distress  Respiratory   Clear To Auscultation bilaterally - no wheezes, rales, rhonchi, or crackles  Cardiology  RRR  Abdominal  Soft, nt, nd  Extremities  (+) edema  Neurological  No focal neurological deficits noted  Psychological  Oriented x 3. Normal affect.               Lab Data Reviewed: (see below)    Medications Reviewed: (see below)    PMH/SH reviewed - no change compared to H&P  ______________________________________________________    Attending Physician: Eulogio Griggs MD     ____________________________________________________  MEDICATIONS:  Current Facility-Administered Medications   Medication Dose Route Frequency    aspirin chewable tablet 81 mg  81 mg Oral ACB    guaiFENesin (ROBITUSSIN) 100 mg/5 mL oral liquid 100 mg  100 mg Oral TID    ipratropium-albuterol (COMBIVENT RESPIMAT) 20 mcg-100 mcg inhalation spray  1 Puff Inhalation Q6H PRN    levothyroxine (SYNTHROID) tablet 100 mcg  100 mcg Oral ACB    pantoprazole (PROTONIX) tablet 40 mg  40 mg Oral ACB    atorvastatin (LIPITOR) tablet 20 mg  20 mg Oral QHS    sodium chloride (NS) flush 5-10 mL  5-10 mL IntraVENous Q8H    sodium chloride (NS) flush 5-10 mL  5-10 mL IntraVENous PRN    acetaminophen (TYLENOL) tablet 650 mg  650 mg Oral Q4H PRN    0.9% sodium chloride with KCl 40 mEq/L infusion   IntraVENous CONTINUOUS    predniSONE (DELTASONE) tablet 10 mg  10 mg Oral DAILY WITH BREAKFAST    albuterol-ipratropium (DUO-NEB) 2.5 MG-0.5 MG/3 ML  3 mL Nebulization Q6H PRN    guaiFENesin ER (MUCINEX) tablet 600 mg  600 mg Oral BID    insulin lispro (HUMALOG) injection   SubCUTAneous TIDAC    glucose chewable tablet 16 g  4 Tab Oral PRN    glucagon (GLUCAGEN) injection 1 mg  1 mg IntraMUSCular PRN    dextrose 10% infusion 125-250 mL  125-250 mL IntraVENous PRN    potassium chloride SR (KLOR-CON 10) tablet 40 mEq  40 mEq Oral TID    potassium, sodium phosphates (NEUTRA-PHOS) packet 2 Packet  2 Packet Oral QID    spironolactone (ALDACTONE) tablet 50 mg  50 mg Oral BID    sodium chloride (NS) flush 5-10 mL  5-10 mL IntraVENous Q8H    sodium chloride (NS) flush 5-10 mL  5-10 mL IntraVENous PRN        LABS:  Recent Labs      09/15/17   2206  09/15/17   2034   WBC  16.1*  16.9*   HGB  13.2  14.0   HCT  36.2  38.2   PLT  185  220     Recent Labs      09/16/17   0714  09/16/17   0208  09/15/17   2206   NA  130*  126*  128*   K  2.1*  1.9*  <1.8*   CL  83*  77*  78*   CO2  37*  45*  40*   BUN  9  7  9   CREA  0.72  0.38*  0.54*   GLU  224*  150*  198*   CA  8.2*  7.9*  8.0*   MG   --    --   2.0   PHOS   --   2.3*   --      Recent Labs      09/15/17   2206  09/15/17   2120   SGOT  31  28   ALT  37  38   AP  88  88   TBILI  0.9  0.6   TP  5.6*  5.7*   ALB  3.0*  2.9*   GLOB  2.6  2.8     No results for input(s): INR, PTP, APTT in the last 72 hours. No lab exists for component: INREXT   No results for input(s): FE, TIBC, PSAT, FERR in the last 72 hours. Recent Labs      09/16/17   0314   PH  7.65*   PCO2  40   PO2  58*     No results for input(s): CPK, CKNDX, TROIQ in the last 72 hours.     No lab exists for component: CPKMB  Lab Results   Component Value Date/Time    Glucose (POC) 166 09/16/2017 11:23 AM    Glucose (POC) 229 09/16/2017 08:19 AM    Glucose (POC) 194 12/24/2012 11:46 AM    Glucose (POC) 293 12/24/2012 11:44 AM    Glucose (POC) 191 12/24/2012 08:06 AM

## 2017-09-16 NOTE — PROGRESS NOTES
TRANSFER - IN REPORT:    Verbal report received from [facility] on Yousif Givens  being received from ED for routine progression of care      Report consisted of patients Situation, Background, Assessment and   Recommendations(SBAR). Information from the following report(s) SBAR was reviewed with the receiving nurse. Opportunity for questions and clarification was provided. Assessment completed upon patients arrival to unit and care assumed.

## 2017-09-16 NOTE — PROGRESS NOTES
[facility]  Pulmonary, Critical Care, and Sleep Medicine    Name: Emerson Nixon MRN: [id number]   : 1960 Hospital: [facility]   Date: 2017        Critical Care Initial Patient Consult    IMPRESSION:   · Severe metabolic alkalosis, not clear what is the exact etiology, Pt is very alkalotic, ph: 7.65! · Acute hypoxic Respiratory Failure  · Probable COPD, Chronic Bronchitis, Smoker. -Suspected mild acute exacerbation. On nebs, oxygen. · Leukocytosis  · Hyponatremia-not sure about her volume status   · Hypokalemia  · Hypochloremia  · Elevated bicarb  · DM has been on metformin. · Anemia: Hgb of 13.2  · Constipation  · Decreasing plts  · Oral Thrush  · Peripheral edema  · Smoker 1ppd  · Discussed with Dr. Richie Castle, Dr. Becca Lowe, nurse. RECOMMENDATIONS:   · Appreciate and look forward to Nephrology input  · Holding metforming  · Rec smoking cessation  · Monitor the BP   · Follow electrolytes with repletion. · Anticipate being able to move to Step down soon. Subjective/History: This patient has been seen and evaluated at the request of Dr. Becca Lowe for above. Patient is a 61 y.o. female   Who presents with a lot of metabolic derangements. Has actually been pretty asymptomatic. Reports per her PCP about 4 weeks ago her metformin was changed and she had nausea and emesis for about 2 weeks. Then about 2 weeks ago her meds were adjust and she had less nausea and vomiting. Now she has been constipated for the last 3-4 days. Seen in ED 5 days ago with acute COPD exacerbation. Has been on a prednisone taper. No fevers, no chills, No weight loss, no cramping. +Consitpation for 3 days. Has been on a diuretic. NO chest pain, no back pain, no leg pain. ROS of 10 other systems is negative except as above.        ROS is otherwise negative   Past Medical History:   Diagnosis Date    Bronchitis     Diabetes ([address]     Endocrine disease     Hypertension     Other ill-defined conditions     high cholesterol      Past Surgical History:   Procedure Laterality Date    HX HEENT      tonsil    HX HEENT      eye ball decompression    HX ORTHOPAEDIC      carpal tunnel      Prior to Admission medications    Medication Sig Start Date End Date Taking? Authorizing Provider   cetirizine (ZYRTEC) 10 mg tablet Take 1 Tab by mouth daily. 9/10/17   Alice Calderón MD   methylPREDNISolone (MEDROL, NALINI,) 4 mg tablet Take as directed on packaging 9/10/17   Alice Calderón MD   guaiFENesin (ROBITUSSIN) 100 mg/5 mL liquid Take 5 mL by mouth three (3) times daily. 12/24/12   Jose Orosco MD   omeprazole (PRILOSEC) 40 mg capsule Take 1 Cap by mouth daily. 12/24/12   Jose Orosco MD   ipratropium-albuterol (COMBIVENT)  mcg/actuation inhaler Take 2 Puffs by inhalation every six (6) hours as needed for Wheezing. 12/24/12   Jose Orosco MD   predniSONE (DELTASONE) 5 mg tablet Take 1 Tab by mouth daily. Take 8 tab po daily for 3 days then 6 tab po daily for 3 days, then 4 tab po daily for 3 days then 2 tab po daily for 3 days, then 1 tab po daily for 3 days then stop 12/24/12   Jose Orosco MD   metFORMIN (GLUCOPHAGE) 500 mg tablet Take 1,000 mg by mouth two (2) times daily (with meals). Ginger Forrester MD   levothyroxine (SYNTHROID) 100 mcg tablet Take 100 mcg by mouth Daily (before breakfast). Ginger Forrester MD   valsartan-hydrochlorothiazide (DIOVAN-HCT) 320-12.5 mg per tablet Take 1 Tab by mouth daily. Ginger Forrester MD   rosuvastatin (CRESTOR) 10 mg tablet Take 10 mg by mouth nightly. Ginger Forrester MD   aspirin 81 mg tablet Take 81 mg by mouth.       Ginger Forrester MD     Current Facility-Administered Medications   Medication Dose Route Frequency    aspirin chewable tablet 81 mg  81 mg Oral ACB    guaiFENesin (ROBITUSSIN) 100 mg/5 mL oral liquid 100 mg  100 mg Oral TID    levothyroxine (SYNTHROID) tablet 100 mcg  100 mcg Oral ACB    pantoprazole (PROTONIX) tablet 40 mg  40 mg Oral ACB    atorvastatin (LIPITOR) tablet 20 mg  20 mg Oral QHS    sodium chloride (NS) flush 5-10 mL  5-10 mL IntraVENous Q8H    0.9% sodium chloride with KCl 40 mEq/L infusion   IntraVENous CONTINUOUS    predniSONE (DELTASONE) tablet 10 mg  10 mg Oral DAILY WITH BREAKFAST    guaiFENesin ER (MUCINEX) tablet 600 mg  600 mg Oral BID    insulin lispro (HUMALOG) injection   SubCUTAneous TIDAC    potassium chloride SR (KLOR-CON 10) tablet 40 mEq  40 mEq Oral TID    potassium phosphate 30 mmol in 0.9% sodium chloride 250 mL infusion   IntraVENous ONCE    sodium chloride (NS) flush 5-10 mL  5-10 mL IntraVENous Q8H     Allergies   Allergen Reactions    Codeine Nausea and Vomiting    Darvocet A500 [Propoxyphene N-Acetaminophen] Nausea and Vomiting      Social History   Substance Use Topics    Smoking status: Current Every Day Smoker     Packs/day: 1.00    Smokeless tobacco: Never Used    Alcohol use Yes      Comment: socially      History reviewed. No pertinent family history. Review of Systems:  A comprehensive review of systems was negative. except as noted above. Objective:   Vital Signs:    Visit Vitals    /79    Pulse 80    Temp 98.3 °F (36.8 °C)    Resp 17    Ht 5' 3\" (1.6 m)    Wt 83.9 kg (184 lb 15.5 oz)    SpO2 95%    Breastfeeding No    BMI 32.77 kg/m2       O2 Device: Room air       Temp (24hrs), Av.1 °F (36.7 °C), Min:98 °F (36.7 °C), Max:98.3 °F (36.8 °C)       Intake/Output:   Last shift:         Last 3 shifts: 1 -  0700  In: 478.3 [I.V.:478.3]  Out: 300 [Urine:300]    Intake/Output Summary (Last 24 hours) at 17 0840  Last data filed at 17 0630   Gross per 24 hour   Intake           478.33 ml   Output              300 ml   Net           178.33 ml       Physical Exam:    General:  Alert, cooperative, no distress, appears stated age. Lying in bed, appears comfortable.     Head:  Normocephalic, without obvious abnormality, atraumatic. Eyes:  Conjunctivae/corneas clear. PERRL, EOMs intact. Nose: Nares normal. Septum midline. Mucosa normal. No drainage or sinus tenderness. Throat: Lips, mucosa, and tongue normal. Teeth and gums normal.   Neck: Supple, symmetrical, trachea midline, no adenopathy, thyroid: no enlargment/tenderness/nodules, no carotid bruit and no JVD. Back:   Symmetric, no curvature. ROM normal.   Lungs:   Some rhonchi and wheezes anteriorly. Chest wall:  No tenderness or deformity. Heart:  Regular rate and rhythm, S1, S2 normal, no murmur, click, rub or gallop. Abdomen:   Soft, non-tender. Bowel sounds normal. No masses,  No organomegaly. Extremities: Extremities normal, atraumatic, no cyanosis, has peripheral edema. Pulses: 2+ and symmetric all extremities.    Skin: Skin color, texture, turgor normal. No rashes or lesions   Lymph nodes: Cervical, supraclavicular, and axillary nodes normal.   Neurologic: Grossly nonfocal       Data:     Recent Results (from the past 24 hour(s))   EKG, 12 LEAD, INITIAL    Collection Time: 09/15/17  6:12 PM   Result Value Ref Range    Ventricular Rate 88 BPM    Atrial Rate 88 BPM    P-R Interval 124 ms    QRS Duration 94 ms    Q-T Interval 358 ms    QTC Calculation (Bezet) 433 ms    Calculated P Axis 69 degrees    Calculated R Axis -15 degrees    Calculated T Axis 43 degrees    Diagnosis       Normal sinus rhythm  Possible Left atrial enlargement  Nonspecific ST and T wave abnormality  Abnormal ECG  When compared with ECG of 22-DEC-2012 22:29,  QRS axis shifted right  T wave amplitude has decreased in Inferior leads     CBC WITH AUTOMATED DIFF    Collection Time: 09/15/17  8:34 PM   Result Value Ref Range    WBC 16.9 (H) 3.6 - 11.0 K/uL    RBC 4.52 3.80 - 5.20 M/uL    HGB 14.0 11.5 - 16.0 g/dL    HCT 38.2 35.0 - 47.0 %    MCV 84.5 80.0 - 99.0 FL    MCH 31.0 26.0 - 34.0 PG    MCHC 36.6 (H) 30.0 - 36.5 g/dL    RDW 12.8 11.5 - 14.5 %    PLATELET 063 714 - 400 K/uL    NEUTROPHILS 88 (H) 32 - 75 %    LYMPHOCYTES 6 (L) 12 - 49 %    MONOCYTES 6 5 - 13 %    EOSINOPHILS 0 0 - 7 %    BASOPHILS 0 0 - 1 %    ABS. NEUTROPHILS 14.9 (H) 1.8 - 8.0 K/UL    ABS. LYMPHOCYTES 1.0 0.8 - 3.5 K/UL    ABS. MONOCYTES 1.0 0.0 - 1.0 K/UL    ABS. EOSINOPHILS 0.0 0.0 - 0.4 K/UL    ABS. BASOPHILS 0.0 0.0 - 0.1 K/UL    DF SMEAR SCANNED      RBC COMMENTS NORMOCYTIC, NORMOCHROMIC     METABOLIC PANEL, COMPREHENSIVE    Collection Time: 09/15/17  9:20 PM   Result Value Ref Range    Sodium 126 (L) 136 - 145 mmol/L    Potassium <1.8 (LL) 3.5 - 5.1 mmol/L    Chloride 77 (L) 97 - 108 mmol/L    CO2 43 (HH) 21 - 32 mmol/L    Anion gap 6 5 - 15 mmol/L    Glucose 198 (H) 65 - 100 mg/dL    BUN 8 6 - 20 MG/DL    Creatinine 0.52 (L) 0.55 - 1.02 MG/DL    BUN/Creatinine ratio 15 12 - 20      GFR est AA >60 >60 ml/min/1.73m2    GFR est non-AA >60 >60 ml/min/1.73m2    Calcium 8.2 (L) 8.5 - 10.1 MG/DL    Bilirubin, total 0.6 0.2 - 1.0 MG/DL    ALT (SGPT) 38 12 - 78 U/L    AST (SGOT) 28 15 - 37 U/L    Alk.  phosphatase 88 45 - 117 U/L    Protein, total 5.7 (L) 6.4 - 8.2 g/dL    Albumin 2.9 (L) 3.5 - 5.0 g/dL    Globulin 2.8 2.0 - 4.0 g/dL    A-G Ratio 1.0 (L) 1.1 - 2.2     URINALYSIS W/ REFLEX CULTURE    Collection Time: 09/15/17  9:28 PM   Result Value Ref Range    Color YELLOW/STRAW      Appearance CLEAR CLEAR      Specific gravity 1.013 1.003 - 1.030      pH (UA) 7.0 5.0 - 8.0      Protein TRACE (A) NEG mg/dL    Glucose 500 (A) NEG mg/dL    Ketone NEGATIVE  NEG mg/dL    Bilirubin NEGATIVE  NEG      Blood NEGATIVE  NEG      Urobilinogen 1.0 0.2 - 1.0 EU/dL    Nitrites NEGATIVE  NEG      Leukocyte Esterase NEGATIVE  NEG      WBC 5-10 0 - 4 /hpf    RBC 5-10 0 - 5 /hpf    Epithelial cells FEW FEW /lpf    Bacteria 1+ (A) NEG /hpf    UA:UC IF INDICATED URINE CULTURE ORDERED (A) CNI     CBC W/O DIFF    Collection Time: 09/15/17 10:06 PM   Result Value Ref Range    WBC 16.1 (H) 3.6 - 11.0 K/uL    RBC 4.41 3.80 - 5.20 M/uL    HGB 13.2 11.5 - 16.0 g/dL    HCT 36.2 35.0 - 47.0 %    MCV 82.1 80.0 - 99.0 FL    MCH 29.9 26.0 - 34.0 PG    MCHC 36.5 30.0 - 36.5 g/dL    RDW 12.7 11.5 - 14.5 %    PLATELET 294 771 - 095 K/uL   METABOLIC PANEL, COMPREHENSIVE    Collection Time: 09/15/17 10:06 PM   Result Value Ref Range    Sodium 128 (L) 136 - 145 mmol/L    Potassium <1.8 (LL) 3.5 - 5.1 mmol/L    Chloride 78 (L) 97 - 108 mmol/L    CO2 40 (H) 21 - 32 mmol/L    Anion gap 10 5 - 15 mmol/L    Glucose 198 (H) 65 - 100 mg/dL    BUN 9 6 - 20 MG/DL    Creatinine 0.54 (L) 0.55 - 1.02 MG/DL    BUN/Creatinine ratio 17 12 - 20      GFR est AA >60 >60 ml/min/1.73m2    GFR est non-AA >60 >60 ml/min/1.73m2    Calcium 8.0 (L) 8.5 - 10.1 MG/DL    Bilirubin, total 0.9 0.2 - 1.0 MG/DL    ALT (SGPT) 37 12 - 78 U/L    AST (SGOT) 31 15 - 37 U/L    Alk.  phosphatase 88 45 - 117 U/L    Protein, total 5.6 (L) 6.4 - 8.2 g/dL    Albumin 3.0 (L) 3.5 - 5.0 g/dL    Globulin 2.6 2.0 - 4.0 g/dL    A-G Ratio 1.2 1.1 - 2.2     MAGNESIUM    Collection Time: 09/15/17 10:06 PM   Result Value Ref Range    Magnesium 2.0 1.6 - 2.4 mg/dL   DRUG SCREEN, URINE    Collection Time: 09/16/17 12:43 AM   Result Value Ref Range    AMPHETAMINES NEGATIVE  NEG      BARBITURATES NEGATIVE  NEG      BENZODIAZEPINE NEGATIVE  NEG      COCAINE NEGATIVE  NEG      METHADONE NEGATIVE  NEG      OPIATES NEGATIVE  NEG      PCP(PHENCYCLIDINE) NEGATIVE  NEG      THC (TH-CANNABINOL) NEGATIVE  NEG      Drug screen comment (NOTE)    URINALYSIS W/ REFLEX CULTURE    Collection Time: 09/16/17 12:43 AM   Result Value Ref Range    Color YELLOW/STRAW      Appearance CLOUDY (A) CLEAR      Specific gravity 1.014 1.003 - 1.030      pH (UA) 7.0 5.0 - 8.0      Protein TRACE (A) NEG mg/dL    Glucose 500 (A) NEG mg/dL    Ketone NEGATIVE  NEG mg/dL    Bilirubin NEGATIVE  NEG      Blood NEGATIVE  NEG      Urobilinogen 1.0 0.2 - 1.0 EU/dL    Nitrites NEGATIVE  NEG      Leukocyte Esterase NEGATIVE  NEG      WBC 5-10 0 - 4 /hpf    RBC 0-5 0 - 5 /hpf    Epithelial cells FEW FEW /lpf    Bacteria NEGATIVE  NEG /hpf    UA:UC IF INDICATED URINE CULTURE ORDERED (A) CNI      Amorphous Crystals FEW (A) NEG      Hyaline cast 0-2 0 - 5 /lpf   METABOLIC PANEL, BASIC    Collection Time: 09/16/17  2:08 AM   Result Value Ref Range    Sodium 126 (L) 136 - 145 mmol/L    Potassium 1.9 (LL) 3.5 - 5.1 mmol/L    Chloride 77 (L) 97 - 108 mmol/L    CO2 45 (HH) 21 - 32 mmol/L    Anion gap 4 (L) 5 - 15 mmol/L    Glucose 150 (H) 65 - 100 mg/dL    BUN 7 6 - 20 MG/DL    Creatinine 0.38 (L) 0.55 - 1.02 MG/DL    BUN/Creatinine ratio 18 12 - 20      GFR est AA >60 >60 ml/min/1.73m2    GFR est non-AA >60 >60 ml/min/1.73m2    Calcium 7.9 (L) 8.5 - 10.1 MG/DL   PHOSPHORUS    Collection Time: 09/16/17  2:08 AM   Result Value Ref Range    Phosphorus 2.3 (L) 2.6 - 4.7 MG/DL   BLOOD GAS, ARTERIAL    Collection Time: 09/16/17  3:14 AM   Result Value Ref Range    pH 7.65 (HH) 7.35 - 7.45      PCO2 40 35.0 - 45.0 mmHg    PO2 58 (L) 80 - 100 mmHg    O2 SAT 95 92 - 97 %    BICARBONATE 43 (H) 22 - 26 mmol/L    BASE EXCESS 19.9 mmol/L    O2 METHOD ROOM AIR      SPONTANEOUS RATE 20      Sample source ARTERIAL      SITE RIGHT RADIAL      RANDY'S TEST YES      Critical value read back CVRB TO NOY CASAS RN AND Amos Valencia MD @ 6050    METABOLIC PANEL, BASIC    Collection Time: 09/16/17  7:14 AM   Result Value Ref Range    Sodium 130 (L) 136 - 145 mmol/L    Potassium 2.1 (LL) 3.5 - 5.1 mmol/L    Chloride 83 (L) 97 - 108 mmol/L    CO2 37 (H) 21 - 32 mmol/L    Anion gap 10 5 - 15 mmol/L    Glucose 224 (H) 65 - 100 mg/dL    BUN 9 6 - 20 MG/DL    Creatinine 0.72 0.55 - 1.02 MG/DL    BUN/Creatinine ratio 13 12 - 20      GFR est AA >60 >60 ml/min/1.73m2    GFR est non-AA >60 >60 ml/min/1.73m2    Calcium 8.2 (L) 8.5 - 10.1 MG/DL   GLUCOSE, POC    Collection Time: 09/16/17  8:19 AM   Result Value Ref Range    Glucose (POC) 229 (H) 65 - 100 mg/dL    Performed by Reuben Valiente Telemetry: EKG: NSR, QTc: 433, LAE,     Imaging:  I have personally reviewed the patients radiographs and have reviewed the reports:  9-10-17: CXR:        Yomi Asif MD

## 2017-09-16 NOTE — PROGRESS NOTES
Pt transferred to CCU via stretcher with tele monitor. VSS. Pt states she still back pain from the gas pains in upper abdomen. Generally swollen all over +2 -+3. Alert and oriented X 4. Speech clear and appropriate.

## 2017-09-16 NOTE — PROGRESS NOTES
1600: Bedside handoff report received from Bertha Weber RN (offgoing nurse). 1810:  Dr. Scot Bassett updated on patient's last potassium level of 2.8 and pt's HYPERtension; will continue with current supplementation and spirnolactone; repeat labs in AM.    1840:  Patient's afternoon potassium resulted at 2.5; Spoke with Dr. Scot Bassett; orders received for additional supplements. 1910:  Bedside handoff report given to Nano Pichardo RN (oncoming nurse).     Richard Matthews RN

## 2017-09-16 NOTE — ED NOTES
TRANSFER - OUT REPORT:    Verbal report given to Chaparrita Victor RN(name) on Emerson Nixon  being transferred to [id number](unit) for routine progression of care       Report consisted of patients Situation, Background, Assessment and   Recommendations(SBAR). Information from the following report(s) SBAR, Kardex, ED Summary, Procedure Summary, Intake/Output, MAR, Recent Results, Med Rec Status and Cardiac Rhythm NSR was reviewed with the receiving nurse. Lines:   Peripheral IV 09/15/17 Right Antecubital (Active)   Site Assessment Clean, dry, & intact 9/15/2017  8:35 PM   Phlebitis Assessment 0 9/15/2017  8:35 PM   Dressing Status Clean, dry, & intact 9/15/2017  8:35 PM   Dressing Type Transparent 9/15/2017  8:35 PM   Hub Color/Line Status Pink 9/15/2017  8:35 PM       Peripheral IV 09/15/17 Right Forearm (Active)   Site Assessment Clean, dry, & intact 9/15/2017 11:41 PM   Phlebitis Assessment 0 9/15/2017 11:41 PM   Infiltration Assessment 0 9/15/2017 11:41 PM   Dressing Status Clean, dry, & intact 9/15/2017 11:41 PM   Dressing Type Tape;Transparent 9/15/2017 11:41 PM   Hub Color/Line Status Pink;Flushed 9/15/2017 11:41 PM       Peripheral IV 09/16/17 Left Forearm (Active)   Site Assessment Clean, dry, & intact 9/16/2017  3:34 AM   Phlebitis Assessment 0 9/16/2017  3:34 AM   Infiltration Assessment 0 9/16/2017  3:34 AM   Dressing Status Clean, dry, & intact 9/16/2017  3:34 AM   Hub Color/Line Status Pink;Patent; Flushed 9/16/2017  3:34 AM   Action Taken Blood drawn 9/16/2017  3:34 AM        Opportunity for questions and clarification was provided.       Patient transported with:   Monitor  Patient-specific medications from Pharmacy  Registered Nurse  Tech

## 2017-09-16 NOTE — ED TRIAGE NOTES
Was here Sunday and received an albuterol treatment and prednisone. Now she is very swollen from her feet up to her abdomen.

## 2017-09-16 NOTE — ED NOTES
Lab called with critical results again for  Lilian Opitz labs on second draw. K+ <1.8, NA+ 126, Cl- 77, CO2 43. Dr. Jamin Rivero notified. Labs being redrawn again from a fresh stick just to make sure there were not any issues.

## 2017-09-16 NOTE — ED NOTES
Bedside shift change report given to Oc Zarate (oncoming nurse) by me (offgoing nurse). Report included the following information SBAR, ED Summary, Procedure Summary, Intake/Output, MAR, Recent Results and Cardiac Rhythm NSR.

## 2017-09-16 NOTE — PROGRESS NOTES
Verbal report given to oncoming nurse KEYONA Galvin RN    Report consisted of patients Situation, Background, Assessment, and   Recommendations    Information was reviewed with the receiving nurse. Opportunity for questions and clarification was provided.

## 2017-09-16 NOTE — ED NOTES
Lab called concerned that labs were contaminated. First set was drawn from a brand new IV without any fluids running. Redrawn.

## 2017-09-16 NOTE — PROGRESS NOTES
Hospitalist Progress Note    NAME: Gianni Fitzpatrick   :  1960   MRN:  [id number]       Assessment / Plan:  Metabolic Alkalosis due to Severe Hypokalemia  Managed in ICU  Mg Ok  Suspect due combination of diuretics and nausea and vomiting  ? Underlying adrenal disease  F/u work up for hyperaldosteronism  Seems to have started on Aldactone per renal  On IVF as well  I think [facility] be [facility]    Hyponatremia  Na improving  Suspect dehydration and on HCTZ PTA  Should never go back on HCTZ  On IVF, monitor lytes    Nausea and Vomiting  Suspect due to metformin vs due to severe hypokalemia itself which could have induced by diuretics vs metformin causing nausea/vomiting inducing hypokalemia  Stopped metformin as could the culprit  Adressing K and monitor    Leukocytosis  Due to steroids  No signs/symptoms of active infection  Remain Afebrile    COPD Exacerbation, POA  Improving, continue Po Prednisone, Mucinex, Albuterol     NIDDM  Stopped metformin as above  A1C 6.6  Will consider alternative medicine for DM upon discharge depending on the BS trend  Continue SSI     Oral Thrush, 2nd to DM and Steroids  mycelex troches     HTN  Holding ARBs/HCTZ   Started aldactone per renal  Monitor for now      Code Status: Will need to clarify, Full Code in the chart and partial code in H&P (NO intubation, OK pressors and BIpap)    Surrogate Decision Maker:      DVT Prophylaxis: SCD  GI Prophylaxis: not indicated     Baseline: FULL ADL works full time                              Subjective: Pt seen and examined at bedside. No more nausea and vomiting.  Overnight events d/w RN   CHIEF COMPLAINT: f/u \"Leg Swelling\"       Review of Systems:  Symptom Y/N Comments  Symptom Y/N Comments   Fever/Chills n   Chest Pain n    Poor Appetite    Edema     Cough n   Abdominal Pain n    Sputum    Joint Pain     SOB/CAMPBELL    Pruritis/Rash     Nausea/vomit n   Tolerating PT/OT     Diarrhea Tolerating Diet y    Constipation    Other       Could NOT obtain due to:      Objective:     VITALS:   Last 24hrs VS reviewed since prior progress note. Most recent are:  Patient Vitals for the past 24 hrs:   Temp Pulse Resp BP SpO2   09/16/17 0800 98.3 °F (36.8 °C) 80 17 150/79 95 %   09/16/17 0712 - 82 11 152/73 96 %   09/16/17 0407 - 75 16 153/74 -   09/16/17 0300 - 73 14 158/86 94 %   09/16/17 0230 - 72 15 153/83 91 %   09/16/17 0200 - 72 17 155/79 94 %   09/16/17 0013 98.1 °F (36.7 °C) 74 13 155/69 92 %   09/16/17 0000 98.1 °F (36.7 °C) - - - -   09/15/17 2300 - 70 16 158/78 97 %   09/15/17 2200 - 80 18 162/80 96 %   09/15/17 2138 - - - 160/84 96 %   09/15/17 2107 - - 16 153/62 96 %   09/15/17 2029 - - 20 156/81 97 %   09/15/17 1809 98 °F (36.7 °C) 95 18 193/82 100 %       Intake/Output Summary (Last 24 hours) at 09/16/17 1046  Last data filed at 09/16/17 0630   Gross per 24 hour   Intake           478.33 ml   Output              300 ml   Net           178.33 ml        PHYSICAL EXAM:  General: WD, WN. Alert, cooperative, no acute distress    EENT:  EOMI. Anicteric sclerae. MMM  Resp:  CTA bilaterally, no wheezing or rales. No accessory muscle use  CV:  Regular  rhythm,  No edema  GI:  Soft, Non distended, Non tender.  +Bowel sounds  Neurologic:  Alert and oriented X 3, normal speech,   Psych:   Good insight. Not anxious nor agitated  Skin:  No rashes.   No jaundice    Reviewed most current lab test results and cultures  YES  Reviewed most current radiology test results   YES  Review and summation of old records today    NO  Reviewed patient's current orders and MAR    YES  PMH/SH reviewed - no change compared to H&P  ________________________________________________________________________  Care Plan discussed with:    Comments   Patient y    Family      RN y    Care Manager     Consultant                        Multidiciplinary team rounds were held today with , nursing, pharmacist and clinical coordinator. Patient's plan of care was discussed; medications were reviewed and discharge planning was addressed. ________________________________________________________________________  Total NON critical care TIME:  35  Minutes    Total CRITICAL CARE TIME Spent:   Minutes non procedure based      Comments   >50% of visit spent in counseling and coordination of care     ________________________________________________________________________  Meron Carvajal MD     Procedures: see electronic medical records for all procedures/Xrays and details which were not copied into this note but were reviewed prior to creation of Plan. LABS:  I reviewed today's most current labs and imaging studies.   Pertinent labs include:  Recent Labs      09/15/17   2206  09/15/17   2034   WBC  16.1*  16.9*   HGB  13.2  14.0   HCT  36.2  38.2   PLT  185  220     Recent Labs      09/16/17   0714  09/16/17   0208  09/15/17   2206  09/15/17   2120   NA  130*  126*  128*  126*   K  2.1*  1.9*  <1.8*  <1.8*   CL  83*  77*  78*  77*   CO2  37*  45*  40*  43*   GLU  224*  150*  198*  198*   BUN  9  7  9  8   CREA  0.72  0.38*  0.54*  0.52*   CA  8.2*  7.9*  8.0*  8.2*   MG   --    --   2.0   --    PHOS   --   2.3*   --    --    ALB   --    --   3.0*  2.9*   TBILI   --    --   0.9  0.6   SGOT   --    --   31  28   ALT   --    --   37  38       Signed: Meron Carvajal MD

## 2017-09-16 NOTE — ED NOTES
Spoke to Dr. Bean Jones. Labs redrawn now to see where the level are at this time. Patient has periods of brief sleep apnea and will drop her sats to the high 80's. Dr. Bean Jones aware.

## 2017-09-16 NOTE — ED PROVIDER NOTES
HPI Comments: Svetlana Maldonado is a 61 y.o. female, pmhx significant for HTN, DM, HLD, who presents ambulatory to the ED c/o gradually worsening BLE swelling and abd distention and constipation with associated low back pressure x 5 days. She mentions that she has been experiencing numbness in her BL feet due to the swelling. Pt reports that she was seen here for SOB 1 week ago, which was resolved with an albuterol treatment. She was d/c home with prednisone and an abx, which she endorses being compliant with. Pt specifically denies SOB, CP, kidney stones, other pain, hx of neuropathy, nausea or vomiting. PCP: Yan Youssef MD      Social Hx: +tobacco (1 ppd), +EtOH (socially), -Illicit Drugs   FHx: no pertinent family hx   Medication Allergies: codeine, darvocet      There are no other complaints, changes, or physical findings at this time. The history is provided by the patient. Past Medical History:   Diagnosis Date    Bronchitis     Diabetes ([address]     Endocrine disease     Hypertension     Other ill-defined conditions     high cholesterol       Past Surgical History:   Procedure Laterality Date    HX HEENT      tonsil    HX HEENT      eye ball decompression    HX ORTHOPAEDIC      carpal tunnel         History reviewed. No pertinent family history. Social History     Social History    Marital status:      Spouse name: N/A    Number of children: N/A    Years of education: N/A     Occupational History    Not on file. Social History Main Topics    Smoking status: Current Every Day Smoker     Packs/day: 1.00    Smokeless tobacco: Never Used    Alcohol use Yes      Comment: socially    Drug use: No    Sexual activity: Yes     Other Topics Concern    Not on file     Social History Narrative         ALLERGIES: Codeine and Darvocet a500 [propoxyphene n-acetaminophen]    Review of Systems   Constitutional: Negative. Negative for chills and fever. HENT: Negative. Negative for congestion and rhinorrhea. Respiratory: Negative. Negative for cough, chest tightness and wheezing. Cardiovascular: Positive for leg swelling (BLE). Negative for chest pain and palpitations. Gastrointestinal: Positive for abdominal distention and constipation. Negative for abdominal pain, nausea and vomiting. Endocrine: Negative. Genitourinary: Negative. Negative for decreased urine volume, flank pain, hematuria and pelvic pain. Musculoskeletal: Positive for back pain (lower pressure). Negative for neck pain. Skin: Negative. Negative for color change, pallor and rash. Neurological: Negative. Negative for dizziness, seizures, weakness, numbness (BL foot numbness) and headaches. Hematological: Negative. Negative for adenopathy. Psychiatric/Behavioral: Negative. All other systems reviewed and are negative. Patient Vitals for the past 12 hrs:   Temp Pulse Resp BP SpO2   09/15/17 2029 - - - 156/81 97 %   09/15/17 1809 98 °F (36.7 °C) 95 18 193/82 100 %       Physical Exam   Constitutional: She is oriented to person, place, and time. She appears well-developed and well-nourished. No distress. HENT:   Head: Normocephalic and atraumatic. Mouth/Throat: No oropharyngeal exudate. Eyes: Conjunctivae are normal. Pupils are equal, round, and reactive to light. Right eye exhibits no discharge. Left eye exhibits no discharge. No scleral icterus. Neck: Normal range of motion. Neck supple. No JVD present. Cardiovascular: Normal rate, regular rhythm, normal heart sounds and intact distal pulses. Exam reveals no gallop and no friction rub. No murmur heard. Pulmonary/Chest: Effort normal and breath sounds normal. No stridor. No respiratory distress. She has no wheezes. She has no rales. She exhibits no tenderness. Abdominal: Soft. Normal aorta. She exhibits distension. She exhibits no abdominal bruit, no pulsatile midline mass and no mass. Bowel sounds are decreased. There is no tenderness. There is no rebound and no guarding. Musculoskeletal: She exhibits edema. Right lower leg: She exhibits swelling and edema. Left lower leg: She exhibits swelling and edema. Non pitting edema bilateral calves, no cords, no calf pain, no cyanosis, good pulses distally   Neurological: She is alert and oriented to person, place, and time. She displays normal reflexes. No cranial nerve deficit. She exhibits normal muscle tone. Coordination normal.   Skin: Skin is warm. No rash noted. She is not diaphoretic. No pallor. Vitals reviewed. MDM  Number of Diagnoses or Management Options  Constipation, unspecified constipation type:   Hypokalemia:   Hyponatremia:   Pedal edema:   Diagnosis management comments: DDx: adrenal insufficiency, adverse drug reaction, renal insufficiency, thyroid dysfunction    Pt presents with vaguer complaints of swelling and tingling in her legs. Has multiple lab abnormalities that do not fit any  1 dx. Will admit to hospitalist and begin treating the electrolyte abnormalities. Amount and/or Complexity of Data Reviewed  Clinical lab tests: reviewed and ordered  Tests in the radiology section of CPT®: reviewed and ordered  Tests in the medicine section of CPT®: reviewed and ordered  Review and summarize past medical records: yes  Discuss the patient with other providers: yes (Hospitalist )  Independent visualization of images, tracings, or specimens: yes    Risk of Complications, Morbidity, and/or Mortality  Presenting problems: high  Diagnostic procedures: moderate  Management options: moderate    Critical Care  Total time providing critical care: 30-74 minutes    Patient Progress  Patient progress: stable    ED Course       Procedures  CONSULT NOTE:   10:53 PM  Cordelia Marroquin MD spoke with Dr. June Lennon  Specialty: Hospitalist  Discussed pt's hx, disposition, and available diagnostic and imaging results. Reviewed care plans.  Consultant will evaluate pt for admission. Written by Lucía Wilde, ED Scribe, as dictated by Zahida Munoz MD.    CRITICAL CARE NOTE :    12:05 AM      IMPENDING DETERIORATION -Cardiovascular, CNS and Metabolic    ASSOCIATED RISK FACTORS - Metabolic changes and Dehydration    MANAGEMENT- Bedside Assessment and Supervision of Care    INTERPRETATION -  Xrays, Blood Gases, ECG and Blood Pressure    INTERVENTIONS - Metobolic interventions    CASE REVIEW - Hospitalist, Nursing and Family    TREATMENT RESPONSE -Stable    PERFORMED BY - Self        NOTES   :      I have spent 40 minutes of critical care time involved in lab review, consultations with specialist, family decision- making, bedside attention and documentation. During this entire length of time I was immediately available to the patient .     Zahida Munoz MD    ADMIT NOTE:  LABORATORY TESTS:  Recent Results (from the past 12 hour(s))   EKG, 12 LEAD, INITIAL    Collection Time: 09/15/17  6:12 PM   Result Value Ref Range    Ventricular Rate 88 BPM    Atrial Rate 88 BPM    P-R Interval 124 ms    QRS Duration 94 ms    Q-T Interval 358 ms    QTC Calculation (Bezet) 433 ms    Calculated P Axis 69 degrees    Calculated R Axis -15 degrees    Calculated T Axis 43 degrees    Diagnosis       Normal sinus rhythm  Possible Left atrial enlargement  Nonspecific ST and T wave abnormality  Abnormal ECG  When compared with ECG of 22-DEC-2012 22:29,  QRS axis shifted right  T wave amplitude has decreased in Inferior leads     CBC WITH AUTOMATED DIFF    Collection Time: 09/15/17  8:34 PM   Result Value Ref Range    WBC 16.9 (H) 3.6 - 11.0 K/uL    RBC 4.52 3.80 - 5.20 M/uL    HGB 14.0 11.5 - 16.0 g/dL    HCT 38.2 35.0 - 47.0 %    MCV 84.5 80.0 - 99.0 FL    MCH 31.0 26.0 - 34.0 PG    MCHC 36.6 (H) 30.0 - 36.5 g/dL    RDW 12.8 11.5 - 14.5 %    PLATELET 367 807 - 754 K/uL    NEUTROPHILS 88 (H) 32 - 75 %    LYMPHOCYTES 6 (L) 12 - 49 %    MONOCYTES 6 5 - 13 %    EOSINOPHILS 0 0 - 7 % BASOPHILS 0 0 - 1 %    ABS. NEUTROPHILS 14.9 (H) 1.8 - 8.0 K/UL    ABS. LYMPHOCYTES 1.0 0.8 - 3.5 K/UL    ABS. MONOCYTES 1.0 0.0 - 1.0 K/UL    ABS. EOSINOPHILS 0.0 0.0 - 0.4 K/UL    ABS. BASOPHILS 0.0 0.0 - 0.1 K/UL    DF SMEAR SCANNED      RBC COMMENTS NORMOCYTIC, NORMOCHROMIC     METABOLIC PANEL, COMPREHENSIVE    Collection Time: 09/15/17  9:20 PM   Result Value Ref Range    Sodium 126 (L) 136 - 145 mmol/L    Potassium <1.8 (LL) 3.5 - 5.1 mmol/L    Chloride 77 (L) 97 - 108 mmol/L    CO2 43 (HH) 21 - 32 mmol/L    Anion gap 6 5 - 15 mmol/L    Glucose 198 (H) 65 - 100 mg/dL    BUN 8 6 - 20 MG/DL    Creatinine 0.52 (L) 0.55 - 1.02 MG/DL    BUN/Creatinine ratio 15 12 - 20      GFR est AA >60 >60 ml/min/1.73m2    GFR est non-AA >60 >60 ml/min/1.73m2    Calcium 8.2 (L) 8.5 - 10.1 MG/DL    Bilirubin, total 0.6 0.2 - 1.0 MG/DL    ALT (SGPT) 38 12 - 78 U/L    AST (SGOT) 28 15 - 37 U/L    Alk.  phosphatase 88 45 - 117 U/L    Protein, total 5.7 (L) 6.4 - 8.2 g/dL    Albumin 2.9 (L) 3.5 - 5.0 g/dL    Globulin 2.8 2.0 - 4.0 g/dL    A-G Ratio 1.0 (L) 1.1 - 2.2     URINALYSIS W/ REFLEX CULTURE    Collection Time: 09/15/17  9:28 PM   Result Value Ref Range    Color YELLOW/STRAW      Appearance CLEAR CLEAR      Specific gravity 1.013 1.003 - 1.030      pH (UA) 7.0 5.0 - 8.0      Protein TRACE (A) NEG mg/dL    Glucose 500 (A) NEG mg/dL    Ketone NEGATIVE  NEG mg/dL    Bilirubin NEGATIVE  NEG      Blood NEGATIVE  NEG      Urobilinogen 1.0 0.2 - 1.0 EU/dL    Nitrites NEGATIVE  NEG      Leukocyte Esterase NEGATIVE  NEG      WBC 5-10 0 - 4 /hpf    RBC 5-10 0 - 5 /hpf    Epithelial cells FEW FEW /lpf    Bacteria 1+ (A) NEG /hpf    UA:UC IF INDICATED URINE CULTURE ORDERED (A) CNI     CBC W/O DIFF    Collection Time: 09/15/17 10:06 PM   Result Value Ref Range    WBC 16.1 (H) 3.6 - 11.0 K/uL    RBC 4.41 3.80 - 5.20 M/uL    HGB 13.2 11.5 - 16.0 g/dL    HCT 36.2 35.0 - 47.0 %    MCV 82.1 80.0 - 99.0 FL    MCH 29.9 26.0 - 34.0 PG    MCHC 36.5 30.0 - 36.5 g/dL    RDW 12.7 11.5 - 14.5 %    PLATELET 790 809 - 210 K/uL   METABOLIC PANEL, COMPREHENSIVE    Collection Time: 09/15/17 10:06 PM   Result Value Ref Range    Sodium 128 (L) 136 - 145 mmol/L    Potassium <1.8 (LL) 3.5 - 5.1 mmol/L    Chloride 78 (L) 97 - 108 mmol/L    CO2 40 (H) 21 - 32 mmol/L    Anion gap 10 5 - 15 mmol/L    Glucose 198 (H) 65 - 100 mg/dL    BUN 9 6 - 20 MG/DL    Creatinine 0.54 (L) 0.55 - 1.02 MG/DL    BUN/Creatinine ratio 17 12 - 20      GFR est AA >60 >60 ml/min/1.73m2    GFR est non-AA >60 >60 ml/min/1.73m2    Calcium 8.0 (L) 8.5 - 10.1 MG/DL    Bilirubin, total 0.9 0.2 - 1.0 MG/DL    ALT (SGPT) 37 12 - 78 U/L    AST (SGOT) 31 15 - 37 U/L    Alk. phosphatase 88 45 - 117 U/L    Protein, total 5.6 (L) 6.4 - 8.2 g/dL    Albumin 3.0 (L) 3.5 - 5.0 g/dL    Globulin 2.6 2.0 - 4.0 g/dL    A-G Ratio 1.2 1.1 - 2.2     MAGNESIUM    Collection Time: 09/15/17 10:06 PM   Result Value Ref Range    Magnesium 2.0 1.6 - 2.4 mg/dL       IMAGING RESULTS:  EXAM:  XR ABD ACUTE W 1 V CHEST     INDICATION:  Abdominal pain.     COMPARISON: Chest radiographs 9/10/2017. Abdomen radiographs 7/11/2006.     TECHNIQUE: Frontal upright chest view and frontal supine and upright views of  the abdomen.     FINDINGS: The cardiomediastinal contours are stable. The lungs and pleural  spaces are clear. There is no pneumothorax.     There is a moderate amount of colonic stool. There are no dilated bowel loops,  air-fluid levels, or intraperitoneal free air. The bones are stable.     IMPRESSION  IMPRESSION:   No acute abnormality in the chest or abdomen. Moderate amount of colonic stool.    MEDICATIONS GIVEN:  Medications   sodium chloride (NS) flush 5-10 mL (10 mL IntraVENous Given 9/15/17 1722)   sodium chloride (NS) flush 5-10 mL (not administered)   potassium chloride 10 mEq in 100 ml IVPB (10 mEq IntraVENous New Bag 9/15/17 3177)   potassium chloride 10 mEq in 100 ml IVPB (not administered)   potassium chloride (K-DUR, KLOR-CON) SR tablet 40 mEq (40 mEq Oral Given 9/15/17 5865)       IMPRESSION:  1. Hyponatremia    2. Hypokalemia    3. Pedal edema    4. Constipation, unspecified constipation type        PLAN: Admit to Hospitalist    10:58 PM  Patient is being admitted to the hospital by Dr. Brianne Peralta. The results of their tests and reasons for their admission have been discussed with them and/or available family. They convey agreement and understanding for the need to be admitted and for their admission diagnosis. Written by LÁZARO Adkins, as dictated by Darren Butler MD.    This note is prepared by Bayron Burrell acting as scribe for MD Darren Benson MD : The scribe's documentation has been prepared under my direction and personally reviewed by me in its entirety. I confirm that the note above accurately reflects all work, treatment, procedures, and medical decision making performed by me.

## 2017-09-16 NOTE — PROGRESS NOTES
Bedside report received from VICTORIA Boyle RN                   Assessment, Background, Procedure summary, Intake/Output, MAR, and recent results discussed. Care assumed. Pt lying in bed with call bell in reach.

## 2017-09-16 NOTE — H&P
Hospitalist Admission Note    NAME: Ben Tobin   :  1960   MRN:  [id number]     Date/Time:  2017 1:07 AM    Patient PCP: Keyon Ingram MD  ________________________________________________________________________    My assessment of this patient's clinical condition and my plan of care is as follows. Assessment / Plan:  Severe Hypokalemia, asymptomatic, POA  Metabolic Alkalosis, POA  Hyponatremia, POA          Patient has severely deranged Electrolytes and at high risk of decompensation. Admit to ICU. Due to patient being asymptomatic of her hypokalemia, I suspect that this is a Chronic process where compensatory mechanism took place. By history this is likely 2nd to Chronic UGI losses due to N&V with associated Chronic use of Diuretics. ? Hyperaldosteronism? Replete K, Orally, and IV vial NS with K and with KCL runs, Stop Valsartan          Urine lytes, Urine Ildefonso, Ildefonso/renin activity,            Renal Consult  To assist with workup    COPD Exacerbation, POA  Improving, continue Po Prednisone, Mucinex, Albuterol    NIDDM  STOP METFORMIN  To see if her GI symptoms resolves  There are other meds that can be used, for now Lispro SS  Check a1c    Oral Thrush, 2nd to DM and Steroids  mycelex troches    HTN, only SBP elevated  Stop Valsartan, HOld off starting any meds for now          Code Status: PARTIAL CODE. NO intubation, OK pressors and BIpap                          She tells me that she has always been DNR. Surrogate Decision Maker:     DVT Prophylaxis: SCD  GI Prophylaxis: not indicated    Baseline: FULL ADL works full time        Subjective:   CHIEF COMPLAINT: Leg Swelling    HISTORY OF PRESENT ILLNESS:     Ben Tobin is a 61 y.o.  female who presents with Leg Swelling. Patient has a hx of HTN, DM, Hypercholesterolemia and COPD who was seen in the ED 5 days ago due to COPD exacerbation.   She was placed on a Prednisone taper and MDI Brio. LAbs were not drawn. She returns today due to Pedal edema. Her COPD is improved, she is at the  Tail end of her Prednisone taker and denies any other symptoms. Labs were drawn and her Labs show a K of 1.8 and Na of 126. The labs were redrawn and Na is 128 and K is 1.8. EKG is essentially normal with decrease amplituted. We were called to admit the patient. Review of history. Patient states that on her usual follow up for DM in June, her Metformin was increased  To 1000 mg BID. Since then she has been having  Intermittent Nausea and Vomiting like nothing would stay Down. By [facility] July she saw her NP provider who thought that changing her Metformin to a longer acting would help. Labs were not drawn during that visit  This time she developed more constipation, increase feeling of Gas and bloating so that she had to change her diet  To lite diet/ soups. She denies weight loss, no leg cramps, no fatigue essentially felt her usual self. She denies any weight loss. She has been continuing to take her other meds  Which included Valsartan ( Diovan and HCTZ). She does not drink ETOH    Initial BP was  however this has since come down to 's without treatment. .  ABG on RA shows  PH 7.65/40/58/95    We were asked to admit for work up and evaluation of the above problems. Past Medical History:   Diagnosis Date    Bronchitis     Diabetes ([address]     Endocrine disease     Hypertension     Other ill-defined conditions     high cholesterol        Past Surgical History:   Procedure Laterality Date    HX HEENT      tonsil    HX HEENT      eye ball decompression    HX ORTHOPAEDIC      carpal tunnel       Social History   Substance Use Topics    Smoking status: Current Every Day Smoker     Packs/day: 1.00    Smokeless tobacco: Never Used    Alcohol use Yes      Comment: socially        History reviewed. No pertinent family history.   Allergies   Allergen Reactions    Codeine Nausea and Vomiting    Darvocet A500 [Propoxyphene N-Acetaminophen] Nausea and Vomiting        Prior to Admission medications    Medication Sig Start Date End Date Taking? Authorizing Provider   cetirizine (ZYRTEC) 10 mg tablet Take 1 Tab by mouth daily. 9/10/17   Caroll Najjar, MD   methylPREDNISolone (MEDROL, NALINI,) 4 mg tablet Take as directed on packaging 9/10/17   Caroll Najjar, MD   guaiFENesin (ROBITUSSIN) 100 mg/5 mL liquid Take 5 mL by mouth three (3) times daily. 12/24/12   Elham Nettles MD   omeprazole (PRILOSEC) 40 mg capsule Take 1 Cap by mouth daily. 12/24/12   Elham Nettles MD   ipratropium-albuterol (COMBIVENT)  mcg/actuation inhaler Take 2 Puffs by inhalation every six (6) hours as needed for Wheezing. 12/24/12   Elham Nettles MD   predniSONE (DELTASONE) 5 mg tablet Take 1 Tab by mouth daily. Take 8 tab po daily for 3 days then 6 tab po daily for 3 days, then 4 tab po daily for 3 days then 2 tab po daily for 3 days, then 1 tab po daily for 3 days then stop 12/24/12   Elham Nettles MD   metFORMIN (GLUCOPHAGE) 500 mg tablet Take 1,000 mg by mouth two (2) times daily (with meals). Ginger Forrester MD   levothyroxine (SYNTHROID) 100 mcg tablet Take 100 mcg by mouth Daily (before breakfast). Ginger Forrester MD   valsartan-hydrochlorothiazide (DIOVAN-HCT) 320-12.5 mg per tablet Take 1 Tab by mouth daily. Ginger Forrester MD   rosuvastatin (CRESTOR) 10 mg tablet Take 10 mg by mouth nightly. Ginger Forrester MD   aspirin 81 mg tablet Take 81 mg by mouth. Ginger Forrester MD       REVIEW OF SYSTEMS:     I am not able to complete the review of systems because:    The patient is intubated and sedated    The patient has altered mental status due to his acute medical problems    The patient has baseline aphasia from prior stroke(s)    The patient has baseline dementia and is not reliable historian    The patient is in acute medical distress and unable to provide information           Total of 12 systems reviewed as follows:       POSITIVE= underlined text  Negative = text not underlined  General:  fever, chills, sweats, generalized weakness, weight loss/gain,      loss of appetite   Eyes:    blurred vision, eye pain, loss of vision, double vision  ENT:    rhinorrhea, pharyngitis   Respiratory:   cough, sputum production, SOB, CAMPBELL, wheezing, pleuritic pain   Cardiology:   chest pain, palpitations, orthopnea, PND, edema, syncope   Gastrointestinal:  abdominal pain , N/V, diarrhea, dysphagia, constipation, bleeding   Genitourinary:  frequency, urgency, dysuria, hematuria, incontinence   Muskuloskeletal :  arthralgia, myalgia, back pain  Hematology:  easy bruising, nose or gum bleeding, lymphadenopathy   Dermatological: rash, ulceration, pruritis, color change / jaundice  Endocrine:   hot flashes or polydipsia   Neurological:  headache, dizziness, confusion, focal weakness, paresthesia,     Speech difficulties, memory loss, gait difficulty  Psychological: Feelings of anxiety, depression, agitation    Objective:   VITALS:    Visit Vitals    /69 (BP 1 Location: Left arm, BP Patient Position: At rest;Lying left side)    Pulse 74    Temp 98.1 °F (36.7 °C)    Resp 13    Ht 5' 3\" (1.6 m)    Wt 83.9 kg (184 lb 15.5 oz)    SpO2 92%    BMI 32.77 kg/m2       PHYSICAL EXAM:    General:    Alert, cooperative, no distress, appears stated age. HEENT: Atraumatic, anicteric sclerae, pink conjunctivae, ORal Thrush     No oral ulcers, mucosa moist, throat clear, dentition fair  Neck:  Supple, symmetrical,  thyroid: non tender  Lungs:   Clear to auscultation bilaterally. No Wheezing or Rhonchi. No rales. Chest wall:  No tenderness  No Accessory muscle use. Heart:   Regular  rhythm,  No  murmur   No edema  Abdomen:   Soft, non-tender. Not distended. Bowel sounds normal  Extremities: No cyanosis. No clubbing,      Skin turgor normal, Capillary refill normal, Radial dial pulse 2+  Skin:     Not pale.   Not Jaundiced  No rashes , TAN colored but only in Exposed areas of sking ( works in yard a lot)  Psych:  Good insight. Not depressed. Not anxious or agitated. Neurologic: EOMs intact. No facial asymmetry. No aphasia or slurred speech. Symmetrical strength, Sensation grossly intact. Alert and oriented X 4.     _______________________________________________________________________  Care Plan discussed with:    Comments   Patient y    Family      RN y    Care Manager                    Consultant:      _______________________________________________________________________  Expected  Disposition:   Home with Family    HH/PT/OT/RN    SNF/LTC    CARROLL    ________________________________________________________________________  TOTAL TIME:  61 Minutes    Critical Care Provided     Minutes non procedure based      Comments    y Reviewed previous records   >50% of visit spent in counseling and coordination of care  Discussion with patient and/or family and questions answered       ________________________________________________________________________  Signed: Stephanie Carter MD    Procedures: see electronic medical records for all procedures/Xrays and details which were not copied into this note but were reviewed prior to creation of Plan.     LAB DATA REVIEWED:    Recent Results (from the past 24 hour(s))   EKG, 12 LEAD, INITIAL    Collection Time: 09/15/17  6:12 PM   Result Value Ref Range    Ventricular Rate 88 BPM    Atrial Rate 88 BPM    P-R Interval 124 ms    QRS Duration 94 ms    Q-T Interval 358 ms    QTC Calculation (Bezet) 433 ms    Calculated P Axis 69 degrees    Calculated R Axis -15 degrees    Calculated T Axis 43 degrees    Diagnosis       Normal sinus rhythm  Possible Left atrial enlargement  Nonspecific ST and T wave abnormality  Abnormal ECG  When compared with ECG of 22-DEC-2012 22:29,  QRS axis shifted right  T wave amplitude has decreased in Inferior leads     CBC WITH AUTOMATED DIFF    Collection Time: 09/15/17  8:34 PM   Result Value Ref Range    WBC 16.9 (H) 3.6 - 11.0 K/uL    RBC 4.52 3.80 - 5.20 M/uL    HGB 14.0 11.5 - 16.0 g/dL    HCT 38.2 35.0 - 47.0 %    MCV 84.5 80.0 - 99.0 FL    MCH 31.0 26.0 - 34.0 PG    MCHC 36.6 (H) 30.0 - 36.5 g/dL    RDW 12.8 11.5 - 14.5 %    PLATELET 650 331 - 547 K/uL    NEUTROPHILS 88 (H) 32 - 75 %    LYMPHOCYTES 6 (L) 12 - 49 %    MONOCYTES 6 5 - 13 %    EOSINOPHILS 0 0 - 7 %    BASOPHILS 0 0 - 1 %    ABS. NEUTROPHILS 14.9 (H) 1.8 - 8.0 K/UL    ABS. LYMPHOCYTES 1.0 0.8 - 3.5 K/UL    ABS. MONOCYTES 1.0 0.0 - 1.0 K/UL    ABS. EOSINOPHILS 0.0 0.0 - 0.4 K/UL    ABS. BASOPHILS 0.0 0.0 - 0.1 K/UL    DF SMEAR SCANNED      RBC COMMENTS NORMOCYTIC, NORMOCHROMIC     METABOLIC PANEL, COMPREHENSIVE    Collection Time: 09/15/17  9:20 PM   Result Value Ref Range    Sodium 126 (L) 136 - 145 mmol/L    Potassium <1.8 (LL) 3.5 - 5.1 mmol/L    Chloride 77 (L) 97 - 108 mmol/L    CO2 43 (HH) 21 - 32 mmol/L    Anion gap 6 5 - 15 mmol/L    Glucose 198 (H) 65 - 100 mg/dL    BUN 8 6 - 20 MG/DL    Creatinine 0.52 (L) 0.55 - 1.02 MG/DL    BUN/Creatinine ratio 15 12 - 20      GFR est AA >60 >60 ml/min/1.73m2    GFR est non-AA >60 >60 ml/min/1.73m2    Calcium 8.2 (L) 8.5 - 10.1 MG/DL    Bilirubin, total 0.6 0.2 - 1.0 MG/DL    ALT (SGPT) 38 12 - 78 U/L    AST (SGOT) 28 15 - 37 U/L    Alk.  phosphatase 88 45 - 117 U/L    Protein, total 5.7 (L) 6.4 - 8.2 g/dL    Albumin 2.9 (L) 3.5 - 5.0 g/dL    Globulin 2.8 2.0 - 4.0 g/dL    A-G Ratio 1.0 (L) 1.1 - 2.2     URINALYSIS W/ REFLEX CULTURE    Collection Time: 09/15/17  9:28 PM   Result Value Ref Range    Color YELLOW/STRAW      Appearance CLEAR CLEAR      Specific gravity 1.013 1.003 - 1.030      pH (UA) 7.0 5.0 - 8.0      Protein TRACE (A) NEG mg/dL    Glucose 500 (A) NEG mg/dL    Ketone NEGATIVE  NEG mg/dL    Bilirubin NEGATIVE  NEG      Blood NEGATIVE  NEG      Urobilinogen 1.0 0.2 - 1.0 EU/dL    Nitrites NEGATIVE  NEG      Leukocyte Esterase NEGATIVE  NEG WBC 5-10 0 - 4 /hpf    RBC 5-10 0 - 5 /hpf    Epithelial cells FEW FEW /lpf    Bacteria 1+ (A) NEG /hpf    UA:UC IF INDICATED URINE CULTURE ORDERED (A) CNI     CBC W/O DIFF    Collection Time: 09/15/17 10:06 PM   Result Value Ref Range    WBC 16.1 (H) 3.6 - 11.0 K/uL    RBC 4.41 3.80 - 5.20 M/uL    HGB 13.2 11.5 - 16.0 g/dL    HCT 36.2 35.0 - 47.0 %    MCV 82.1 80.0 - 99.0 FL    MCH 29.9 26.0 - 34.0 PG    MCHC 36.5 30.0 - 36.5 g/dL    RDW 12.7 11.5 - 14.5 %    PLATELET 463 046 - 069 K/uL   METABOLIC PANEL, COMPREHENSIVE    Collection Time: 09/15/17 10:06 PM   Result Value Ref Range    Sodium 128 (L) 136 - 145 mmol/L    Potassium <1.8 (LL) 3.5 - 5.1 mmol/L    Chloride 78 (L) 97 - 108 mmol/L    CO2 40 (H) 21 - 32 mmol/L    Anion gap 10 5 - 15 mmol/L    Glucose 198 (H) 65 - 100 mg/dL    BUN 9 6 - 20 MG/DL    Creatinine 0.54 (L) 0.55 - 1.02 MG/DL    BUN/Creatinine ratio 17 12 - 20      GFR est AA >60 >60 ml/min/1.73m2    GFR est non-AA >60 >60 ml/min/1.73m2    Calcium 8.0 (L) 8.5 - 10.1 MG/DL    Bilirubin, total 0.9 0.2 - 1.0 MG/DL    ALT (SGPT) 37 12 - 78 U/L    AST (SGOT) 31 15 - 37 U/L    Alk.  phosphatase 88 45 - 117 U/L    Protein, total 5.6 (L) 6.4 - 8.2 g/dL    Albumin 3.0 (L) 3.5 - 5.0 g/dL    Globulin 2.6 2.0 - 4.0 g/dL    A-G Ratio 1.2 1.1 - 2.2     MAGNESIUM    Collection Time: 09/15/17 10:06 PM   Result Value Ref Range    Magnesium 2.0 1.6 - 2.4 mg/dL

## 2017-09-16 NOTE — PROGRESS NOTES
Problem: Falls - Risk of  Goal: *Absence of Falls  Document Tray Fall Risk and appropriate interventions in the flowsheet.   Outcome: Progressing Towards Goal  Fall Risk Interventions:

## 2017-09-17 NOTE — PROGRESS NOTES
Hospitalist Progress Note    NAME: Ben Tobin   :  1960   MRN:  [id number]       Assessment / Plan:  Metabolic Alkalosis due to Severe Hypokalemia vs Contraction alkalosis with dehydration  Managed in ICU  Mg Ok  Suspect due combination of diuretics and nausea and vomiting  ? Underlying adrenal disease  F/u work up for hyperaldosteronism  Started transiently on Aldactone per renal while workup pending  On IVF as well  Ok to be transferred to tele  repleting K agressively    Hyponatremia  Na improving  Suspect dehydration and on HCTZ PTA  Should never go back on HCTZ  On IVF, monitor lytes    Nausea and Vomiting  Suspect due to metformin vs due to severe hypokalemia itself which could have induced by diuretics vs metformin causing nausea/vomiting inducing hypokalemia  Stopped metformin as could the culprit  Adressing K and monitor    COPD Exacerbation, POA  Improving, continue Po Prednisone, Mucinex, Albuterol     NIDDM  Stopped metformin as above  A1C 6.6  Will consider alternative medicine for DM upon discharge depending on the BS trend  Continue SSI     Oral Thrush, 2nd to DM and Steroids  mycelex troches     HTN  Holding ARBs/HCTZ   Started aldactone per renal  Monitor for now      Code Status: Will need to clarify, Full Code in the chart and partial code in H&P (NO intubation, OK pressors and BIpap)    Surrogate Decision Maker:      DVT Prophylaxis: SCD  GI Prophylaxis: not indicated     Baseline: FULL ADL works full time                              Subjective: Pt seen and examined at bedside. Started to feel better. No more nausea and vomiting.  Overnight events d/w RN   CHIEF COMPLAINT: f/u \"Leg Swelling\"       Review of Systems:  Symptom Y/N Comments  Symptom Y/N Comments   Fever/Chills n   Chest Pain n    Poor Appetite    Edema     Cough n   Abdominal Pain n    Sputum    Joint Pain     SOB/CAMPBELL    Pruritis/Rash     Nausea/vomit n   Tolerating PT/OT     Diarrhea    Tolerating Diet y Constipation    Other       Could NOT obtain due to:      Objective:     VITALS:   Last 24hrs VS reviewed since prior progress note. Most recent are:  Patient Vitals for the past 24 hrs:   Temp Pulse Resp BP SpO2   09/17/17 0800 97.9 °F (36.6 °C) 70 14 143/67 93 %   09/17/17 0700 - 67 16 - (!) 89 %   09/17/17 0400 98.4 °F (36.9 °C) 70 15 140/65 93 %   09/17/17 0158 - - - - 96 %   09/17/17 0023 97.7 °F (36.5 °C) 71 18 144/67 90 %   09/16/17 2200 - 70 14 - 92 %   09/16/17 2030 - - - - 94 %   09/16/17 2000 98.6 °F (37 °C) 76 14 143/73 -   09/16/17 1619 98.1 °F (36.7 °C) 78 14 180/87 95 %   09/16/17 1202 - 84 19 141/80 96 %   09/16/17 1117 98.3 °F (36.8 °C) 83 18 158/81 94 %   09/16/17 1000 - 82 14 94/40 98 %   09/16/17 0918 - 85 17 (!) 160/98 97 %       Intake/Output Summary (Last 24 hours) at 09/17/17 0913  Last data filed at 09/17/17 0834   Gross per 24 hour   Intake             4630 ml   Output             4692 ml   Net              -62 ml        PHYSICAL EXAM:  General: WD, WN. Alert, cooperative, no acute distress    EENT:  EOMI. Anicteric sclerae. MMM  Resp:  CTA bilaterally, no wheezing or rales. No accessory muscle use  CV:  Regular  rhythm,  No edema  GI:  Soft, Non distended, Non tender.  +Bowel sounds  Neurologic:  Alert and oriented X 3, normal speech,   Psych:   Good insight. Not anxious nor agitated  Skin:  No rashes. No jaundice    Reviewed most current lab test results and cultures  YES  Reviewed most current radiology test results   YES  Review and summation of old records today    NO  Reviewed patient's current orders and MAR    YES  PMH/SH reviewed - no change compared to H&P  ________________________________________________________________________  Care Plan discussed with:    Comments   Patient y    Family      RN y    Care Manager     Consultant                        Multidiciplinary team rounds were held today with , nursing, pharmacist and clinical coordinator.   Patient's plan of care was discussed; medications were reviewed and discharge planning was addressed. ________________________________________________________________________  Total NON critical care TIME:  30  Minutes    Total CRITICAL CARE TIME Spent:   Minutes non procedure based      Comments   >50% of visit spent in counseling and coordination of care     ________________________________________________________________________  Jaycee Ruiz MD     Procedures: see electronic medical records for all procedures/Xrays and details which were not copied into this note but were reviewed prior to creation of Plan. LABS:  I reviewed today's most current labs and imaging studies. Pertinent labs include:  Recent Labs      09/17/17   0414  09/15/17   2206  09/15/17   2034   WBC  18.2*  16.1*  16.9*   HGB  13.7  13.2  14.0   HCT  38.5  36.2  38.2   PLT  192  185  220     Recent Labs      09/17/17   0414  09/16/17   1715  09/16/17   1152   09/16/17   0208  09/15/17   2206  09/15/17   2120   NA  133*  132*  132*   < >  126*  128*  126*   K  2.9*  2.5*  2.8*   < >  1.9*  <1.8*  <1.8*   CL  92*  89*  87*   < >  77*  78*  77*   CO2  34*  37*  36*   < >  45*  40*  43*   GLU  165*  178*  133*   < >  150*  198*  198*   BUN  10  9  9   < >  7  9  8   CREA  0.52*  0.51*  0.45*   < >  0.38*  0.54*  0.52*   CA  8.2*  7.7*  7.8*   < >  7.9*  8.0*  8.2*   MG  2.1   --   1.9   --    --   2.0   --    PHOS  2.6   --    --    --   2.3*   --    --    ALB  3.2*   --    --    --    --   3.0*  2.9*   TBILI  0.6   --    --    --    --   0.9  0.6   SGOT  46*   --    --    --    --   31  28   ALT  52   --    --    --    --   37  38    < > = values in this interval not displayed.        Signed: Jaycee Ruiz MD

## 2017-09-17 NOTE — PROGRESS NOTES
0140 Up to chair at bedside. Stated that the hospital bed was making her back have spasms. Refused any pain medication at this time. 5 Stated that she needed a breathing treatment. DUO-Neb given by respiratory. Tolerated well. 0430 Back to bed with minimal assistance. 0600 24 hour urine collection completed and sent to lab.

## 2017-09-17 NOTE — PROGRESS NOTES
[facility]  Pulmonary, Critical Care, and Sleep Medicine    Name: Darby Allred MRN: [id number]   : 1960 Hospital: [facility]   Date: 2017        Critical Care Patient Consult    IMPRESSION:   · Severe metabolic alkalosis, not clear what is the exact etiology, Pt is very alkalotic, ph: 7.65! · Acute hypoxic Respiratory Failure  · Probable COPD, Chronic Bronchitis, Smoker. -Suspected mild acute exacerbation. On nebs, oxygen. · Leukocytosis  · Hyponatremia-not sure about her volume status   · Hypokalemia  · Hypochloremia  · Elevated bicarb  · DM has been on metformin. · Anemia  · Constipation  · Decreasing plts  · Oral Thrush  · Peripheral edema  · Smoker 1ppd  · Discussed with Dr. Chip Dodson, Dr. Peggy Zapata, nurse. RECOMMENDATIONS:   · Renal following  · Holding metforming  · Rec smoking cessation  · Monitor the BP   · Follow electrolytes with repletion. · Transfer to [facility], Day 2 waiting for bed     Subjective/History:     Pt feeling better today. No acute issues. NO cramps of arms or legs. No HA. No chest pain, no shortness of breath. This patient has been seen and evaluated at the request of Dr. Peggy Zapata for above. Patient is a 61 y.o. female   Who presents with a lot of metabolic derangements. Has actually been pretty asymptomatic. Reports per her PCP about 4 weeks ago her metformin was changed and she had nausea and emesis for about 2 weeks. Then about 2 weeks ago her meds were adjust and she had less nausea and vomiting. Now she has been constipated for the last 3-4 days. Seen in ED 5 days ago with acute COPD exacerbation. Has been on a prednisone taper. No fevers, no chills, No weight loss, no cramping. +Consitpation for 3 days. Has been on a diuretic. NO chest pain, no back pain, no leg pain. ROS of 10 other systems is negative except as above.        ROS is otherwise negative   Past Medical History:   Diagnosis Date    Bronchitis  Diabetes ([facility] [address]     Endocrine disease     Hypertension     Other ill-defined conditions     high cholesterol      Past Surgical History:   Procedure Laterality Date    HX HEENT      tonsil    HX HEENT      eye ball decompression    HX ORTHOPAEDIC      carpal tunnel      Prior to Admission medications    Medication Sig Start Date End Date Taking? Authorizing Provider   cetirizine (ZYRTEC) 10 mg tablet Take 1 Tab by mouth daily. 9/10/17   Vero Vega MD   methylPREDNISolone (MEDROL, NALINI,) 4 mg tablet Take as directed on packaging 9/10/17   Vero Vega MD   guaiFENesin (ROBITUSSIN) 100 mg/5 mL liquid Take 5 mL by mouth three (3) times daily. 12/24/12   Rhiannon Howell MD   omeprazole (PRILOSEC) 40 mg capsule Take 1 Cap by mouth daily. 12/24/12   Rhiannon Howell MD   ipratropium-albuterol (COMBIVENT)  mcg/actuation inhaler Take 2 Puffs by inhalation every six (6) hours as needed for Wheezing. 12/24/12   Rhiannon Howell MD   predniSONE (DELTASONE) 5 mg tablet Take 1 Tab by mouth daily. Take 8 tab po daily for 3 days then 6 tab po daily for 3 days, then 4 tab po daily for 3 days then 2 tab po daily for 3 days, then 1 tab po daily for 3 days then stop 12/24/12   Rhiannon Howell MD   metFORMIN (GLUCOPHAGE) 500 mg tablet Take 1,000 mg by mouth two (2) times daily (with meals). Ginger Forrester MD   levothyroxine (SYNTHROID) 100 mcg tablet Take 100 mcg by mouth Daily (before breakfast). Ginger Forrester MD   valsartan-hydrochlorothiazide (DIOVAN-HCT) 320-12.5 mg per tablet Take 1 Tab by mouth daily. Ginger Forrester MD   rosuvastatin (CRESTOR) 10 mg tablet Take 10 mg by mouth nightly. Ginger Forrester MD   aspirin 81 mg tablet Take 81 mg by mouth.       Ginger Forrester MD     Current Facility-Administered Medications   Medication Dose Route Frequency    potassium chloride 10 mEq in 50 ml IVPB  10 mEq IntraVENous Q1H    potassium chloride SR (KLOR-CON 10) tablet 40 mEq  40 mEq Oral TID    aspirin chewable tablet 81 mg  81 mg Oral ACB    guaiFENesin (ROBITUSSIN) 100 mg/5 mL oral liquid 100 mg  100 mg Oral TID    levothyroxine (SYNTHROID) tablet 100 mcg  100 mcg Oral ACB    pantoprazole (PROTONIX) tablet 40 mg  40 mg Oral ACB    atorvastatin (LIPITOR) tablet 20 mg  20 mg Oral QHS    sodium chloride (NS) flush 5-10 mL  5-10 mL IntraVENous Q8H    0.9% sodium chloride with KCl 40 mEq/L infusion   IntraVENous CONTINUOUS    predniSONE (DELTASONE) tablet 10 mg  10 mg Oral DAILY WITH BREAKFAST    guaiFENesin ER (MUCINEX) tablet 600 mg  600 mg Oral BID    insulin lispro (HUMALOG) injection   SubCUTAneous TIDAC    potassium, sodium phosphates (NEUTRA-PHOS) packet 2 Packet  2 Packet Oral QID    spironolactone (ALDACTONE) tablet 50 mg  50 mg Oral BID    sodium chloride (NS) flush 5-10 mL  5-10 mL IntraVENous Q8H     Allergies   Allergen Reactions    Codeine Nausea and Vomiting    Darvocet A500 [Propoxyphene N-Acetaminophen] Nausea and Vomiting      Social History   Substance Use Topics    Smoking status: Current Every Day Smoker     Packs/day: 1.00    Smokeless tobacco: Never Used    Alcohol use Yes      Comment: socially      History reviewed. No pertinent family history. Review of Systems:  A comprehensive review of systems was negative. except as noted above. Objective:   Vital Signs:    Visit Vitals    /65 (BP 1 Location: Right arm, BP Patient Position: Lying left side)    Pulse 70    Temp 98.4 °F (36.9 °C)    Resp 15    Ht 5' 3\" (1.6 m)    Wt 83.9 kg (184 lb 15.5 oz)    SpO2 93%    Breastfeeding No    BMI 32.77 kg/m2       O2 Device: Room air   O2 Flow Rate (L/min): 2 l/min   Temp (24hrs), Av.2 °F (36.8 °C), Min:97.7 °F (36.5 °C), Max:98.6 °F (37 °C)       Intake/Output:   Last shift:         Last 3 shifts: 09/15 1901 -  0700  In: 4418.3 [P.O.:1790;  I.V.:2628.3]  Out: 4592 [Urine:4590]    Intake/Output Summary (Last 24 hours) at 17 6062  Last data filed at 09/17/17 8555   Gross per 24 hour   Intake             3940 ml   Output             4292 ml   Net             -352 ml       Physical Exam:    General:  Alert, cooperative, no distress, appears stated age. Lying in bed, appears comfortable. Head:  Normocephalic, without obvious abnormality, atraumatic. Eyes:  Conjunctivae/corneas clear. PERRL, EOMs intact. Nose: Nares normal. Septum midline. Mucosa normal. No drainage or sinus tenderness. Throat: Lips, mucosa, and tongue normal. Teeth and gums normal.   Neck: Supple, symmetrical, trachea midline, no adenopathy, thyroid: no enlargment/tenderness/nodules, no carotid bruit and no JVD. Back:   Symmetric, no curvature. ROM normal.   Lungs:   Pretty clear today. Good air movement. NO wheezing. Chest wall:  No tenderness or deformity. Heart:  Regular rate and rhythm, S1, S2 normal, no murmur, click, rub or gallop. Abdomen:   Soft, non-tender. Bowel sounds normal. No masses,  No organomegaly. Extremities: Extremities normal, atraumatic, no cyanosis, has peripheral edema. Pulses: 2+ and symmetric all extremities.    Skin: Skin color, texture, turgor normal. No rashes or lesions   Lymph nodes: Cervical, supraclavicular, and axillary nodes normal.   Neurologic: Grossly nonfocal       Data:     Recent Results (from the past 24 hour(s))   GLUCOSE, POC    Collection Time: 09/16/17  8:19 AM   Result Value Ref Range    Glucose (POC) 229 (H) 65 - 100 mg/dL    Performed by [employer]    GLUCOSE, POC    Collection Time: 09/16/17 11:23 AM   Result Value Ref Range    Glucose (POC) 166 (H) 65 - 100 mg/dL    Performed by [employer]    METABOLIC PANEL, BASIC    Collection Time: 09/16/17 11:52 AM   Result Value Ref Range    Sodium 132 (L) 136 - 145 mmol/L    Potassium 2.8 (L) 3.5 - 5.1 mmol/L    Chloride 87 (L) 97 - 108 mmol/L    CO2 36 (H) 21 - 32 mmol/L    Anion gap 9 5 - 15 mmol/L    Glucose 133 (H) 65 - 100 mg/dL    BUN 9 6 - 20 MG/DL    Creatinine 0.45 (L) 0.55 - 1.02 MG/DL    BUN/Creatinine ratio 20 12 - 20      GFR est AA >60 >60 ml/min/1.73m2    GFR est non-AA >60 >60 ml/min/1.73m2    Calcium 7.8 (L) 8.5 - 10.1 MG/DL   MAGNESIUM    Collection Time: 09/16/17 11:52 AM   Result Value Ref Range    Magnesium 1.9 1.6 - 2.4 mg/dL   GLUCOSE, POC    Collection Time: 09/16/17  4:55 PM   Result Value Ref Range    Glucose (POC) 203 (H) 65 - 100 mg/dL    Performed by [email or web address]    METABOLIC PANEL, BASIC    Collection Time: 09/16/17  5:15 PM   Result Value Ref Range    Sodium 132 (L) 136 - 145 mmol/L    Potassium 2.5 (LL) 3.5 - 5.1 mmol/L    Chloride 89 (L) 97 - 108 mmol/L    CO2 37 (H) 21 - 32 mmol/L    Anion gap 6 5 - 15 mmol/L    Glucose 178 (H) 65 - 100 mg/dL    BUN 9 6 - 20 MG/DL    Creatinine 0.51 (L) 0.55 - 1.02 MG/DL    BUN/Creatinine ratio 18 12 - 20      GFR est AA >60 >60 ml/min/1.73m2    GFR est non-AA >60 >60 ml/min/1.73m2    Calcium 7.7 (L) 8.5 - 10.1 MG/DL   CBC W/O DIFF    Collection Time: 09/17/17  4:14 AM   Result Value Ref Range    WBC 18.2 (H) 3.6 - 11.0 K/uL    RBC 4.59 3.80 - 5.20 M/uL    HGB 13.7 11.5 - 16.0 g/dL    HCT 38.5 35.0 - 47.0 %    MCV 83.9 80.0 - 99.0 FL    MCH 29.8 26.0 - 34.0 PG    MCHC 35.6 30.0 - 36.5 g/dL    RDW 13.4 11.5 - 14.5 %    PLATELET 940 702 - 173 K/uL   MAGNESIUM    Collection Time: 09/17/17  4:14 AM   Result Value Ref Range    Magnesium 2.1 1.6 - 2.4 mg/dL   PHOSPHORUS    Collection Time: 09/17/17  4:14 AM   Result Value Ref Range    Phosphorus 2.6 2.6 - 4.7 MG/DL   METABOLIC PANEL, COMPREHENSIVE    Collection Time: 09/17/17  4:14 AM   Result Value Ref Range    Sodium 133 (L) 136 - 145 mmol/L    Potassium 2.9 (L) 3.5 - 5.1 mmol/L    Chloride 92 (L) 97 - 108 mmol/L    CO2 34 (H) 21 - 32 mmol/L    Anion gap 7 5 - 15 mmol/L    Glucose 165 (H) 65 - 100 mg/dL    BUN 10 6 - 20 MG/DL    Creatinine 0.52 (L) 0.55 - 1.02 MG/DL    BUN/Creatinine ratio 19 12 - 20      GFR est AA >60 >60 ml/min/1.73m2    GFR est non-AA >60 >60 ml/min/1.73m2 Calcium 8.2 (L) 8.5 - 10.1 MG/DL    Bilirubin, total 0.6 0.2 - 1.0 MG/DL    ALT (SGPT) 52 12 - 78 U/L    AST (SGOT) 46 (H) 15 - 37 U/L    Alk.  phosphatase 98 45 - 117 U/L    Protein, total 5.9 (L) 6.4 - 8.2 g/dL    Albumin 3.2 (L) 3.5 - 5.0 g/dL    Globulin 2.7 2.0 - 4.0 g/dL    A-G Ratio 1.2 1.1 - 2.2               Telemetry: EKG: NSR, QTc: 433, LAE,     Imaging:  I have personally reviewed the patients radiographs and have reviewed the reports:  9-10-17: CXR:        Jennifer Zamora MD

## 2017-09-17 NOTE — PROGRESS NOTES
[facility] Progress Note        NAME: Bahraini Loss       :  1960       MRN:  [id number]     Date/Time: 2017    Risk of deterioration: low       Assessment:    Plan:  Severe hypokalemia  Hyponatremia, hypophosphatemia  N/V with 10 pound weight loss  Contraction alkalosis   Edema  Ongoing tobacco use  COPD  DM - off metformin K and Phos being repleted aggressively. --kdur and neutrophos  Na has improved with IVF and contraction alkalosis improving with volume resuscitation  Labs/urine sent for eval of hyperaldo (but would expect normal sodium and a lower k, not < 1.9)  In interim will place on spironolactone at 50 mg bid (holding arb/hctz) to help as a k sparing diuretic   Pt had a low k as far back as -doesn't recall having a low k by office labs in past  Couldn't tolerate the IV k, so have switched to po         Subjective:     Chief Complaint:  The potassium burns my skin    Review--no n/v/diarrhea/cp/sob    Objective:     VITALS:   Last 24hrs VS reviewed since prior progress note.  Most recent are:  Visit Vitals    /79 (BP 1 Location: Right arm, BP Patient Position: Sitting)    Pulse 91    Temp 98.1 °F (36.7 °C)    Resp 16    Ht 5' 3\" (1.6 m)    Wt 83.9 kg (184 lb 15.5 oz)    SpO2 97%    Breastfeeding No    BMI 32.77 kg/m2     SpO2 Readings from Last 6 Encounters:   17 97%   09/10/17 95%   12 93%    O2 Flow Rate (L/min): 2 l/min       Intake/Output Summary (Last 24 hours) at 17 1356  Last data filed at 17 1100   Gross per 24 hour   Intake          3911.67 ml   Output             3840 ml   Net            71.67 ml        Telemetry Reviewed       PHYSICAL EXAM:    General   well developed, well nourished, appears stated age, in no acute distress  Respiratory   Clear To Auscultation bilaterally - no wheezes, rales, rhonchi, or crackles  Cardiology  RRR  Abdominal  Soft, nt, nd  Extremities  (+) edema  Neurological  No focal neurological deficits noted  Psychological Oriented x 3. Normal affect.               Lab Data Reviewed: (see below)    Medications Reviewed: (see below)    PMH/SH reviewed - no change compared to H&P  ______________________________________________________    Attending Physician: Marbella Avila MD     ____________________________________________________  MEDICATIONS:  Current Facility-Administered Medications   Medication Dose Route Frequency    potassium chloride (K-DUR, KLOR-CON) SR tablet 40 mEq  40 mEq Oral Q3H    aspirin chewable tablet 81 mg  81 mg Oral ACB    guaiFENesin (ROBITUSSIN) 100 mg/5 mL oral liquid 100 mg  100 mg Oral TID    ipratropium-albuterol (COMBIVENT RESPIMAT) 20 mcg-100 mcg inhalation spray  1 Puff Inhalation Q6H PRN    levothyroxine (SYNTHROID) tablet 100 mcg  100 mcg Oral ACB    pantoprazole (PROTONIX) tablet 40 mg  40 mg Oral ACB    atorvastatin (LIPITOR) tablet 20 mg  20 mg Oral QHS    sodium chloride (NS) flush 5-10 mL  5-10 mL IntraVENous Q8H    sodium chloride (NS) flush 5-10 mL  5-10 mL IntraVENous PRN    acetaminophen (TYLENOL) tablet 650 mg  650 mg Oral Q4H PRN    0.9% sodium chloride with KCl 40 mEq/L infusion   IntraVENous CONTINUOUS    predniSONE (DELTASONE) tablet 10 mg  10 mg Oral DAILY WITH BREAKFAST    albuterol-ipratropium (DUO-NEB) 2.5 MG-0.5 MG/3 ML  3 mL Nebulization Q6H PRN    guaiFENesin ER (MUCINEX) tablet 600 mg  600 mg Oral BID    insulin lispro (HUMALOG) injection   SubCUTAneous TIDAC    glucose chewable tablet 16 g  4 Tab Oral PRN    glucagon (GLUCAGEN) injection 1 mg  1 mg IntraMUSCular PRN    dextrose 10% infusion 125-250 mL  125-250 mL IntraVENous PRN    spironolactone (ALDACTONE) tablet 50 mg  50 mg Oral BID    magnesium sulfate 2 g/50 ml IVPB (premix or compounded)  2 g IntraVENous ONCE PRN    sodium chloride (NS) flush 5-10 mL  5-10 mL IntraVENous Q8H    sodium chloride (NS) flush 5-10 mL  5-10 mL IntraVENous PRN        LABS:  Recent Labs      09/17/17   0414  09/15/17   8773 WBC  18.2*  16.1*   HGB  13.7  13.2   HCT  38.5  36.2   PLT  192  185     Recent Labs      09/17/17   0414  09/16/17   1715  09/16/17   1152   09/16/17   0208  09/15/17   2206   NA  133*  132*  132*   < >  126*  128*   K  2.9*  2.5*  2.8*   < >  1.9*  <1.8*   CL  92*  89*  87*   < >  77*  78*   CO2  34*  37*  36*   < >  45*  40*   BUN  10  9  9   < >  7  9   CREA  0.52*  0.51*  0.45*   < >  0.38*  0.54*   GLU  165*  178*  133*   < >  150*  198*   CA  8.2*  7.7*  7.8*   < >  7.9*  8.0*   MG  2.1   --   1.9   --    --   2.0   PHOS  2.6   --    --    --   2.3*   --     < > = values in this interval not displayed. Recent Labs      09/17/17 0414  09/15/17   2206  09/15/17   2120   SGOT  46*  31  28   ALT  52  37  38   AP  98  88  88   TBILI  0.6  0.9  0.6   TP  5.9*  5.6*  5.7*   ALB  3.2*  3.0*  2.9*   GLOB  2.7  2.6  2.8     No results for input(s): INR, PTP, APTT in the last 72 hours. No lab exists for component: INREXT, INREXT   No results for input(s): FE, TIBC, PSAT, FERR in the last 72 hours. Recent Labs      09/16/17   0314   PH  7.65*   PCO2  40   PO2  58*     No results for input(s): CPK, CKNDX, TROIQ in the last 72 hours.     No lab exists for component: CPKMB  Lab Results   Component Value Date/Time    Glucose (POC) 306 09/17/2017 12:18 PM    Glucose (POC) 161 09/17/2017 08:11 AM    Glucose (POC) 203 09/16/2017 04:55 PM    Glucose (POC) 166 09/16/2017 11:23 AM    Glucose (POC) 229 09/16/2017 08:19 AM

## 2017-09-18 NOTE — PROGRESS NOTES
[facility] Progress Note        NAME: Efraín Mina       :  1960       MRN:  [id number]     Date/Time: 2017    Risk of deterioration: low       Assessment:    Plan:  Severe hypokalemia  Hyponatremia, hypophosphatemia  N/V with 10 pound weight loss  Contraction alkalosis   Edema  Ongoing tobacco use  COPD  DM - off metformin Potassium now normal.  Renin/maile pending. She says that her potassium has always been on the low side. I suspect her protracted N/V caused the drop and expect that her potassium should remain OK as long as she can keep up with po intake. OK for D/C from my point of view. Was on HCTZ which has been stopped. Would continue spironolactone 50 mg a day. She needs to have a potassium checked next week. She says that she will get that done at her PCP's office. Subjective:     Chief Complaint:  \"I feel much better. \"  No N/V. No dyspnea. No HA. Review--no n/v/diarrhea/cp/sob    Objective:     VITALS:   Last 24hrs VS reviewed since prior progress note.  Most recent are:  Visit Vitals    /78 (BP 1 Location: Right arm, BP Patient Position: Sitting)    Pulse 78    Temp 98.7 °F (37.1 °C)    Resp 17    Ht 5' 3\" (1.6 m)    Wt 83.9 kg (184 lb 15.5 oz)    SpO2 97%    Breastfeeding No    BMI 32.77 kg/m2     SpO2 Readings from Last 6 Encounters:   17 97%   09/10/17 95%   12 93%    O2 Flow Rate (L/min): 2 l/min       Intake/Output Summary (Last 24 hours) at 17 1040  Last data filed at 17 8017   Gross per 24 hour   Intake          2796.67 ml   Output             4325 ml   Net         -1528.33 ml        Telemetry Reviewed       PHYSICAL EXAM:    General   NAD  CTA  RRR  abd soft  No edema             Lab Data Reviewed: (see below)    Medications Reviewed: (see below)    PMH/SH reviewed - no change compared to H&P  ______________________________________________________    Attending Physician: Maile Mariscal MD ____________________________________________________  MEDICATIONS:  Current Facility-Administered Medications   Medication Dose Route Frequency    mupirocin (BACTROBAN) 2 % ointment   Both Nostrils BID    atorvastatin (LIPITOR) tablet 40 mg  40 mg Oral QHS    [START ON 9/19/2017] levothyroxine (SYNTHROID) tablet 88 mcg  88 mcg Oral ACB    metFORMIN ER (GLUCOPHAGE XR) tablet 1,000 mg  1,000 mg Oral BID WITH MEALS    fluticasone-vilanterol (BREO ELLIPTA) 100mcg-25mcg/puff  1 Puff Inhalation DAILY    aspirin chewable tablet 81 mg  81 mg Oral ACB    guaiFENesin (ROBITUSSIN) 100 mg/5 mL oral liquid 100 mg  100 mg Oral TID    ipratropium-albuterol (COMBIVENT RESPIMAT) 20 mcg-100 mcg inhalation spray  1 Puff Inhalation Q6H PRN    pantoprazole (PROTONIX) tablet 40 mg  40 mg Oral ACB    sodium chloride (NS) flush 5-10 mL  5-10 mL IntraVENous Q8H    sodium chloride (NS) flush 5-10 mL  5-10 mL IntraVENous PRN    acetaminophen (TYLENOL) tablet 650 mg  650 mg Oral Q4H PRN    0.9% sodium chloride with KCl 40 mEq/L infusion   IntraVENous CONTINUOUS    albuterol-ipratropium (DUO-NEB) 2.5 MG-0.5 MG/3 ML  3 mL Nebulization Q6H PRN    guaiFENesin ER (MUCINEX) tablet 600 mg  600 mg Oral BID    insulin lispro (HUMALOG) injection   SubCUTAneous TIDAC    glucose chewable tablet 16 g  4 Tab Oral PRN    glucagon (GLUCAGEN) injection 1 mg  1 mg IntraMUSCular PRN    dextrose 10% infusion 125-250 mL  125-250 mL IntraVENous PRN    spironolactone (ALDACTONE) tablet 50 mg  50 mg Oral BID    sodium chloride (NS) flush 5-10 mL  5-10 mL IntraVENous Q8H    sodium chloride (NS) flush 5-10 mL  5-10 mL IntraVENous PRN        LABS:  Recent Labs      09/18/17   0422  09/17/17   0414   WBC  16.9*  18.2*   HGB  12.7  13.7   HCT  35.6  38.5   PLT  169  192     Recent Labs      09/18/17   0422  09/17/17   2004  09/17/17   0414   09/16/17   1152   09/16/17   0208   NA  136  134*  133*   < >  132*   < >  126*   K  3.8  3.1*  2.9*   < > 2.8*   < >  1.9*   CL  100  96*  92*   < >  87*   < >  77*   CO2  28  33*  34*   < >  36*   < >  45*   BUN  10  12  10   < >  9   < >  7   CREA  0.43*  0.61  0.52*   < >  0.45*   < >  0.38*   GLU  165*  182*  165*   < >  133*   < >  150*   CA  8.1*  7.9*  8.2*   < >  7.8*   < >  7.9*   MG  1.8   --   2.1   --   1.9   --    --    PHOS  2.7   --   2.6   --    --    --   2.3*    < > = values in this interval not displayed. Recent Labs      09/18/17   0422  09/17/17   0414  09/15/17   2206   SGOT  37  46*  31   ALT  55  52  37   AP  98  98  88   TBILI  0.6  0.6  0.9   TP  5.7*  5.9*  5.6*   ALB  2.8*  3.2*  3.0*   GLOB  2.9  2.7  2.6     No results for input(s): INR, PTP, APTT in the last 72 hours. No lab exists for component: INREXT, INREXT   No results for input(s): FE, TIBC, PSAT, FERR in the last 72 hours. Recent Labs      09/16/17   0314   PH  7.65*   PCO2  40   PO2  58*     No results for input(s): CPK, CKNDX, TROIQ in the last 72 hours.     No lab exists for component: CPKMB  Lab Results   Component Value Date/Time    Glucose (POC) 168 09/18/2017 07:40 AM    Glucose (POC) 215 09/17/2017 04:41 PM    Glucose (POC) 306 09/17/2017 12:18 PM    Glucose (POC) 161 09/17/2017 08:11 AM    Glucose (POC) 203 09/16/2017 04:55 PM

## 2017-09-18 NOTE — DIABETES MGMT
[facility] Progress Note    Recommendations/ Comments: Pt discussed with rounding team and Dr. Uche Celis. Plan to resume metformin ER 1000mg ac b/d. Chart reviewed on Kareem Ruffin during Multidisciplinary Rounds. A1c:   Lab Results   Component Value Date/Time    Hemoglobin A1c 6.6 09/16/2017 03:14 AM           Recent Glucose Results: Lab Results   Component Value Date/Time     (H) 09/18/2017 04:22 AM     (H) 09/17/2017 08:04 PM    GLUCPOC 168 (H) 09/18/2017 07:40 AM    GLUCPOC 215 (H) 09/17/2017 04:41 PM    GLUCPOC 306 (H) 09/17/2017 12:18 PM        Lab Results   Component Value Date/Time    Creatinine 0.43 09/18/2017 04:22 AM     Estimated Creatinine Clearance: 146.3 mL/min (based on Cr of 0.43). Active Orders   Diet    DIET DIABETIC CONSISTENT CARB Regular        PO intake: Patient Vitals for the past 72 hrs:   % Diet Eaten   09/18/17 0928 100 %   09/16/17 1837 75 %   09/16/17 0918 75 %       Will continue to follow as needed. Thank you.   SHERI Bishop, RN, [address]

## 2017-09-18 NOTE — CDMP QUERY
Patient is noted to have a BMI of 32.77. Please clarify if this patient is: The [address] [place] [address] has issued a statement indicating that, \"Individuals who are overweight, obese, or morbidly obese are at an increased risk for certain medical conditions when compared to persons of normal weight. Therefore, these conditions are always clinically significant and reportable when documented by the provider. \"    =>Obesity (BMI of 30-39.9)  => Morbid Obesity  (BMI 40 or greater)  =>Overweight (BMI 25-29.9)  => Other weight status (specify  status)  => Unable to determine        Presentation: ht 5' 3\" Wt 184lb BMI 32.77          Please clarify and document your clinical opinion in the progress notes and discharge summary, including the definitive and or presumptive diagnosis, (suspected or probable), related to the above clinical findings. Please include clinical findings supporting your diagnosis.    Thank [address] [place] [address], [address] [place] [address] Rd     [phone]

## 2017-09-18 NOTE — DISCHARGE SUMMARY
Hospitalist Discharge Summary     Patient ID:  Alma Rosa Mcdowell  [id number]  89 y.o.  1960    PCP on record: Rita Mckeon MD    Admit date: 9/15/2017  Discharge date and time: 9/18/2017      DISCHARGE DIAGNOSIS:  Metabolic Alkalosis due to Severe Hypokalemia vs Contraction alkalosis with dehydration  Hyponatremia  Nausea and Vomiting  COPD Exacerbation  NIDDM  Oral Thrush, 2nd to DM and Steroids  HTN    CONSULTATIONS:  IP CONSULT TO NEPHROLOGY    Excerpted HPI from H&P of Gerardo Solis MD:  Alma Rosa Mcdowell is a 61 y.o.  female who presents with Leg Swelling.     Patient has a hx of HTN, DM, Hypercholesterolemia and COPD who was seen in the ED 5 days ago due to COPD exacerbation. She was placed on a Prednisone taper and MDI Brio. LAbs were not drawn. She returns today due to Pedal edema. Her COPD is improved, she is at the  Tail end of her Prednisone taker and denies any other symptoms. Labs were drawn and her Labs show a K of 1.8 and Na of 126. The labs were redrawn and Na is 128 and K is 1.8. EKG is essentially normal with decrease amplituted. We were called to admit the patient.     Review of history. Patient states that on her usual follow up for DM in June, her Metformin was increased  To 1000 mg BID. Since then she has been having  Intermittent Nausea and Vomiting like nothing would stay Down. By [facility] July she saw her NP provider who thought that changing her Metformin to a longer acting would help. Labs were not drawn during that visit  This time she developed more constipation, increase feeling of Gas and bloating so that she had to change her diet  To lite diet/ soups. She denies weight loss, no leg cramps, no fatigue essentially felt her usual self. She denies any weight loss. She has been continuing to take her other meds  Which included Valsartan ( Diovan and HCTZ).  She does not drink ETOH     Initial BP was  however this has since come down to SBP 150's without treatment. .  ABG on RA shows  PH 7.65/40/58/95     We were asked to admit for work up and evaluation of the above problems. ______________________________________________________________________  DISCHARGE SUMMARY/HOSPITAL COURSE:  for full details see H&P, daily progress notes, labs, consult notes. Metabolic Alkalosis due to Severe Hypokalemia vs Contraction alkalosis with dehydration  Managed in ICU initially  Mg Ok  Suspect due combination of diuretics and nausea and vomiting  ? Underlying adrenal disease  work up for hyperaldosteronism pending, will be followed by Nephrology as an OP d/w Dr Martin Schreiber  Started on aldactone, they recommended to send her out on 50mg daily  Given IVF as well while in hospital  K now normal  Instructed to have BMP in 1 week with PCP   Hyponatremia  Na improving  Suspect dehydration with nausea/vomiting and on HCTZ PTA  Should never go back on HCTZ  resolved     Nausea and Vomiting  Suspect due to metformin vs due to severe hypokalemia itself which could have induced by diuretics vs metformin causing nausea/vomiting inducing hypokalemia  Stopped metformin as could the culprit  Hypokalemia resolved     COPD Exacerbation, POA  Improving, weaned off prednisone, Mucinex, Nebs      NIDDM  Stopped metformin as above  A1C 6.6  Start Amaryl 1mg PO daily with recommendations to close followup with PCP      HTN  Resume ARBs  Stopped HCTZ   Started aldactone per renal  _______________________________________________________________________  Patient seen and examined by me on discharge day. Pertinent Findings:  Gen:    Not in distress  Chest: Clear lungs  CVS:   Regular rhythm.   No edema  Abd:  Soft, not distended, not tender  Neuro:  Alert, non focal  _______________________________________________________________________  DISCHARGE MEDICATIONS:   Current Discharge Medication List      START taking these medications    Details   valsartan (DIOVAN) 320 mg tablet Take 1 Tab by mouth daily. Qty: 30 Tab, Refills: 0      spironolactone (ALDACTONE) 50 mg tablet Take 1 Tab by mouth daily. Qty: 30 Tab, Refills: 0      guaiFENesin ER (MUCINEX) 600 mg ER tablet Take 1 Tab by mouth two (2) times a day for 7 days. Qty: 14 Tab, Refills: 0      glimepiride (AMARYL) 1 mg tablet Take 1 Tab by mouth Daily (before breakfast). Qty: 30 Tab, Refills: 0      fluticasone-vilanterol (BREO ELLIPTA) 100-25 mcg/dose inhaler Take 1 Puff by inhalation daily. Qty: 1 Inhaler, Refills: 0      albuterol-ipratropium (DUO-NEB) 2.5 mg-0.5 mg/3 ml nebu 3 mL by Nebulization route every four (4) hours as needed. Qty: 100 Nebule, Refills: 0      Nebulizer & Compressor machine To use nebs as directed  Qty: 1 Each, Refills: 0         CONTINUE these medications which have NOT CHANGED    Details   cetirizine (ZYRTEC) 10 mg tablet Take 1 Tab by mouth daily. Qty: 20 Tab, Refills: 0      guaiFENesin (ROBITUSSIN) 100 mg/5 mL liquid Take 5 mL by mouth three (3) times daily. Qty: 120 mL, Refills: 0      omeprazole (PRILOSEC) 40 mg capsule Take 1 Cap by mouth daily. Qty: 30 Cap, Refills: 1      ipratropium-albuterol (COMBIVENT)  mcg/actuation inhaler Take 2 Puffs by inhalation every six (6) hours as needed for Wheezing. Qty: 1 Inhaler, Refills: 1      levothyroxine (SYNTHROID) 100 mcg tablet Take 100 mcg by mouth Daily (before breakfast). rosuvastatin (CRESTOR) 10 mg tablet Take 10 mg by mouth nightly. aspirin 81 mg tablet Take 81 mg by mouth.            STOP taking these medications       methylPREDNISolone (MEDROL, NALINI,) 4 mg tablet Comments:   Reason for Stopping:         predniSONE (DELTASONE) 5 mg tablet Comments:   Reason for Stopping:         metFORMIN (GLUCOPHAGE) 500 mg tablet Comments:   Reason for Stopping:         valsartan-hydrochlorothiazide (DIOVAN-HCT) 320-12.5 mg per tablet Comments:   Reason for Stopping:               My Recommended Diet, Activity, Wound Care, and follow-up labs are listed in the patient's Discharge Insturctions which I have personally completed and reviewed.     _______________________________________________________________________  DISPOSITION:    Home with Family: y   Home with HH/PT/OT/RN:    SNF/LTC:    CARROLL:    OTHER:        Condition at Discharge:  Stable  _______________________________________________________________________  Follow up with:   PCP : Lydia Tatum MD  Follow-up Information     Follow up With Details Comments [address] [place] [address], [place]   [address] [address]  [phone]                Total time in minutes spent coordinating this discharge (includes going over instructions, follow-up, prescriptions, and preparing report for sign off to her PCP) :  35 minutes    Signed:  Noel Bolaños MD

## 2017-09-18 NOTE — PROGRESS NOTES
1900 Bedside and Verbal shift change report given to Linda  (oncoming nurse) by Shun Cam (offgoing nurse). Report included the following information SBAR, ED Summary, Intake/Output, MAR, Recent Results and Cardiac Rhythm  NSR .  2310 Duo-Neb administered to patient by Respiratory, per patient request. Tolerated well. 0310 Patient up to chair with minimal assistance, steady gait, feet elevated. Call bell within reach. Patient states sitting up will aid in bloating relief.

## 2017-09-18 NOTE — DISCHARGE INSTRUCTIONS
HOSPITALIST DISCHARGE INSTRUCTIONS    NAME: Ross Green   :  1960   MRN:  [id number]     Date/Time:  2017 11:28 AM    ADMIT DATE: 9/15/2017     DISCHARGE DATE: 2017     DISCHARGE DIAGNOSIS:  Metabolic Alkalosis due to Severe Hypokalemia vs Contraction alkalosis with dehydration  Hyponatremia  Nausea and Vomiting  COPD Exacerbation  NIDDM  Oral Thrush, 2nd to DM and Steroids  HTN    MEDICATIONS:  · It is important that you take the medication exactly as they are prescribed. · Keep your medication in the bottles provided by the pharmacist and keep a list of the medication names, dosages, and times to be taken in your wallet. · Do not take other medications without consulting your doctor. Pain Management: per above medications    What to do at Home    Recommended diet:  Diabetic Diet    Recommended activity: Activity as tolerated    If you have questions regarding the hospital related prescriptions or hospital related issues please call [facility] kian Smith at [phone]. If you experience any of the following symptoms then please call your primary care physician or return to the emergency room if you cannot get hold of your doctor:  Fever, chills, nausea, vomiting, diarrhea, change in mentation, falling, bleeding, shortness of breath      Information obtained by :  I understand that if any problems occur once I am at home I am to contact my physician. I understand and acknowledge receipt of the instructions indicated above. Physician's or R.N.'s Signature                                                                  Date/Time     [employer] Activation    Thank you for requesting access to [employer]. Please follow the instructions below to securely access and download your online medical record.  [employer] allows you to send messages to your doctor, view your test results, renew your prescriptions, schedule appointments, and more. How Do I Sign Up? 1. In your internet browser, go to [email or web address]  2. Click on the First Time User? Click Here link in the Sign In box. You will be redirect to the New Member Sign Up page. 3. Enter your [employer] Access Code exactly as it appears below. You will not need to use this code after youve completed the sign-up process. If you do not sign up before the expiration date, you must request a new code. [employer] Access Code: [email or web address]  Expires: 2017  8:03 PM (This is the date your [employer] access code will )    4. Enter the last four digits of your Social Security Number (xxxx) and Date of Birth (mm/dd/yyyy) as indicated and click Submit. You will be taken to the next sign-up page. 5. Create a [employer]UZ[employer]ZOR ID. This will be your [employer] login ID and cannot be changed, so think of one that is secure and easy to remember. 6. Create a [employer] password. You can change your password at any time. 7. Enter your Password Reset Question and Answer. This can be used at a later time if you forget your password. 8. Enter your e-mail address. You will receive e-mail notification when new information is available in [address] 9. Click Sign Up. You can now view and download portions of your medical record. 10. Click the Download Summary menu link to download a portable copy of your medical information. Additional Information    If you have questions, please visit the Frequently Asked Questions section of the [employer] website at [email or web address] Remember, [employer] is NOT to be used for urgent needs. For medical emergencies, dial 911.                                                                                                                                              Patient or Representative Signature Date/Time

## 2017-09-18 NOTE — PROGRESS NOTES
Patient assisted to chair from [facility]. Patient tolerates activity well.   1002 Patient complains of SOB at this time and requesting a breathing treatment. Respiratory in and breathing treatment started at this time. 1020 Patient reports breathing better at this time. 26 Dr. Martin Schreiber in to see patient. 1110 Incentive spirometer given to patient and patient instructed on use. Patient able to perform correctly. 200 Dr. Olga Colmenares in to see patient. 1200 Discharge instructions printed and reviewed with patient. Reviewed follow up appointments, prescriptions, medications received today and medications still needed, and reviewed discharge instructions. Prescriptions with patient. Patient without questions at this time. 1216 patient discharged by wheelchair via front entrance of hospital. Patient escorted by tech and her .

## 2017-09-18 NOTE — PROGRESS NOTES
Patient with imminent DC per RN without PT needs. Spoke to patient who declined any difficulty with stability, functional mobility. She is negative for all fall risk screening questioning. She is diligent with a home exercise program alongside her spouse. No therapy needs indicated; will sign off. Time spent: 6 minutes. Salina Marmolejo PT, DPT.

## 2017-09-18 NOTE — PROGRESS NOTES
2110 Dr. Arvin Denver called with Potassium level. Order received to give an additional KCL 60 meq po over the next two hours. 0000 Nasal cannula O2 at 2 l/min put on at this time due to decrease SAT's while sleeping.  0645 Up several times during the night to void and have a bowel movement. SOB on exertion. Steady gait. 0715 Bedside and Verbal shift change report given to Shaw Llanes (oncoming nurse) by Dharmesh Samuels (offgoing nurse). Report included the following information SBAR, Kardex, ED Summary, Intake/Output, MAR, Recent Results and Cardiac Rhythm NSR.

## 2017-09-18 NOTE — ROUTINE PROCESS
[facility] [facility]    Fax: [phone]        To Whom may it concern      This letter is to certify that  Gianni Fitzpatrick   was admitted under our care on 9/15/2017 and discharged on 9/18/2017                  . Gianni Fitzpatrick  may return to work/school on Monday 9/25/2017    If you have any questions, please do not hesitate to contact me.         Dr. Jeannie Carreon

## 2017-09-18 NOTE — INTERDISCIPLINARY ROUNDS
Interdisciplinary team rounds were held 9/18/17 with the following team members:Care Management, Diabetes Treatment Specialist, Nursing, Nutrition, Pharmacy, Physical Therapy, Physician, Respiratory Therapy and Clinical Coordinator. Plan of care discussed. Goal: See MD orders and progress notes for further  interventions and desired outcomes.

## 2017-09-28 PROBLEM — E87.1 HYPONATREMIA: Status: ACTIVE | Noted: 2017-01-01

## 2017-09-28 NOTE — ED TRIAGE NOTES
Pt. Presents to ED today for complaints of BLE leg pain that has been going on \"for a few days. \" Pt. States that she was recently admitted to the CCU for low K levels. Pt. Alert and oriented x4. PT. Placed on monitor x3.

## 2017-09-28 NOTE — IP AVS SNAPSHOT
[address] [facility] 
[phone] Patient: Saba Brewer MRN: [id number] YYX:[id number] Current Discharge Medication List  
  
START taking these medications Dose & Instructions Dispensing Information Comments Morning Noon Evening Bedtime  
 ketoconazole 200 mg tablet Commonly known as:  NIZORAL Your last dose was: Your next dose is:    
   
   
 Dose:  200 mg Take 1 Tab by mouth two (2) times a day for 60 days. Quantity:  60 Tab Refills:  1  
     
   
   
   
  
 oxyCODONE IR 5 mg immediate release tablet Commonly known as:  Urban Chute Your last dose was: Your next dose is:    
   
   
 Dose:  5 mg Take 1 Tab by mouth every four (4) hours as needed for Pain. Max Daily Amount: 30 mg.  
 Quantity:  30 Tab Refills:  0  
     
   
   
   
  
 potassium chloride SR 10 mEq tablet Commonly known as:  KLOR-CON 10 Replaces:  potassium chloride 20 mEq tablet Your last dose was: Your next dose is:    
   
   
 Dose:  30 mEq Take 3 Tabs by mouth three (3) times daily for 60 days. Quantity:  270 Tab Refills:  1 CONTINUE these medications which have CHANGED Dose & Instructions Dispensing Information Comments Morning Noon Evening Bedtime  
 cholecalciferol 1,000 unit tablet Commonly known as:  VITAMIN D3 What changed:  Another medication with the same name was removed. Continue taking this medication, and follow the directions you see here. Your last dose was: Your next dose is:    
   
   
 Dose:  1000 Units Take 1,000 Units by mouth daily. Refills:  0  
     
   
   
   
  
 valsartan 320 mg tablet Commonly known as:  DIOVAN What changed:  Another medication with the same name was removed. Continue taking this medication, and follow the directions you see here. Your last dose was: Your next dose is:    
   
   
 Dose:  320 mg Take 320 mg by mouth daily. Refills:  0 CONTINUE these medications which have NOT CHANGED Dose & Instructions Dispensing Information Comments Morning Noon Evening Bedtime  
 albuterol-ipratropium 2.5 mg-0.5 mg/3 ml Nebu Commonly known as:  Smiley Krabbe Your last dose was: Your next dose is:    
   
   
 Dose:  3 mL  
3 mL by Nebulization route every four (4) hours as needed. Quantity:  100 Nebule Refills:  0  
     
   
   
   
  
 amLODIPine 5 mg tablet Commonly known as:  Carlos Goinsdle Your last dose was: Your next dose is:    
   
   
 Dose:  5 mg Take 5 mg by mouth daily. Refills:  0  
     
   
   
   
  
 fish oil-omega-3 fatty acids 340-1,000 mg capsule Your last dose was: Your next dose is:    
   
   
 Dose:  1 Cap Take 1 Cap by mouth daily. Refills:  0  
     
   
   
   
  
 fluticasone-vilanterol 100-25 mcg/dose inhaler Commonly known as:  BREO ELLIPTA Your last dose was: Your next dose is:    
   
   
 Dose:  1 Puff Take 1 Puff by inhalation daily. Quantity:  1 Inhaler Refills:  0  
     
   
   
   
  
 glimepiride 1 mg tablet Commonly known as:  AMARYL Your last dose was: Your next dose is:    
   
   
 Dose:  1 mg Take 1 Tab by mouth Daily (before breakfast). Quantity:  30 Tab Refills:  0  
     
   
   
   
  
 levothyroxine 88 mcg tablet Commonly known as:  SYNTHROID Your last dose was: Your next dose is:    
   
   
 Dose:  88 mcg Take 88 mcg by mouth Daily (before breakfast). Refills:  0  
     
   
   
   
  
 magnesium oxide 400 mg tablet Commonly known as:  MAG-OX Your last dose was: Your next dose is:    
   
   
 Dose:  400 mg Take 1 Tab by mouth two (2) times a day for 60 days. Quantity:  60 Tab Refills:  1 PREVACID 15 mg capsule Generic drug:  lansoprazole Your last dose was: Your next dose is:    
   
   
 Dose:  30 mg Take 30 mg by mouth Daily (before breakfast). Refills:  0 VENTOLIN HFA 90 mcg/actuation inhaler Generic drug:  albuterol Your last dose was: Your next dose is:    
   
   
 Dose:  2 Puff Take 2 Puffs by inhalation every four (4) hours as needed for Wheezing or Shortness of Breath. Refills:  0  
     
   
   
   
  
 WOMEN'S MULTIVITAMIN GUMMIES PO Your last dose was: Your next dose is:    
   
   
 Dose:  2 Cap Take 2 Caps by mouth daily. Refills:  0 STOP taking these medications   
 aspirin 81 mg tablet CRESTOR 10 mg tablet Generic drug:  rosuvastatin  
   
  
 guaiFENesin 100 mg/5 mL liquid Commonly known as:  ROBITUSSIN  
   
  
 metFORMIN 500 mg Tg24 24 hour tablet Commonly known as:  GLUMETZA ER  
   
  
 omeprazole 40 mg capsule Commonly known as:  PRILOSEC  
   
  
 potassium chloride 20 mEq tablet Commonly known as:  K-DUR, KLOR-CON Replaced by:  potassium chloride SR 10 mEq tablet  
   
  
 spironolactone 50 mg tablet Commonly known as:  ALDACTONE Where to Get Your Medications Information on where to get these meds will be given to you by the nurse or doctor. ! Ask your nurse or doctor about these medications  
  ketoconazole 200 mg tablet  
 magnesium oxide 400 mg tablet  
 oxyCODONE IR 5 mg immediate release tablet  
 potassium chloride SR 10 mEq tablet

## 2017-09-28 NOTE — PROGRESS NOTES
Pharmacy Clarification of Prior to Admission Medication Regimen     The patient was interviewed regarding clarification of the prior to admission medication regimen.  was present in room and obtained permission from patient to discuss drug regimen with visitor(s) present. Patient was questioned regarding use of any other inhalers, topical products, over the counter medications, herbal medications, vitamin products or ophthalmic/nasal/otic medication use. Information Obtained From: Patient, RX Query    Pertinent Pharmacy Findings:   Patient stated that she finished her last dose of Methylprednisone (4 mg) dose pack (6 day regimen) today (9/28/2017).  glimepiride (AMARYL) 1 mg tablet: Patient stated that her doctor told her to not take this agent. Patient stated that her doctor adjusted her Metformin dose and to 'hold off' taking this agent, until they see how the patient is going to do on her new metformin dose. PTA medication list was corrected to the following:     Prior to Admission Medications   Prescriptions Last Dose Informant Patient Reported? Taking? MULTIVIT-MINERALS/FOLIC ACID (WOMEN'S MULTIVITAMIN GUMMIES PO) 9/28/2017 at Unknown time Self Yes Yes   Sig: Take 2 Caps by mouth daily. albuterol (VENTOLIN HFA) 90 mcg/actuation inhaler 9/28/2017 at Unknown time Self Yes Yes   Sig: Take 2 Puffs by inhalation every four (4) hours as needed for Wheezing or Shortness of Breath. albuterol-ipratropium (DUO-NEB) 2.5 mg-0.5 mg/3 ml nebu 9/24/2017 at Unknown time Self No Yes   Sig: 3 mL by Nebulization route every four (4) hours as needed. amLODIPine (NORVASC) 5 mg tablet 9/28/2017 at Unknown time Self Yes Yes   Sig: Take 5 mg by mouth daily. aspirin 81 mg tablet 9/28/2017 at Unknown time Self Yes Yes   Sig: Take 81 mg by mouth daily. cholecalciferol, VITAMIN D3, (VITAMIN D3) 5,000 unit tab tablet 9/28/2017 at Unknown time Self Yes Yes   Sig: Take 5,000 Units by mouth daily. fluticasone-vilanterol (BREO ELLIPTA) 100-25 mcg/dose inhaler 9/28/2017 at Unknown time Self No Yes   Sig: Take 1 Puff by inhalation daily. glimepiride (AMARYL) 1 mg tablet Not Taking at Unknown time Self No No   Sig: Take 1 Tab by mouth Daily (before breakfast). guaiFENesin (ROBITUSSIN) 100 mg/5 mL liquid 9/27/2017 at Unknown time Self Yes Yes   Sig: Take 100 mg by mouth nightly. lansoprazole (PREVACID) 15 mg capsule 9/28/2017 at Unknown time Self Yes Yes   Sig: Take 30 mg by mouth Daily (before breakfast). levothyroxine (SYNTHROID) 100 mcg tablet 9/28/2017 at Unknown time Self Yes Yes   Sig: Take 100 mcg by mouth Daily (before breakfast). magnesium oxide (MAG-OX) 400 mg tablet 9/28/2017 at Unknown time Self Yes Yes   Sig: Take 400 mg by mouth two (2) times a day. metFORMIN (GLUMETZA ER) 500 mg TG24 24 hour tablet 9/28/2017 at Unknown time Self Yes Yes   Sig: Take 1,000 mg by mouth daily (with breakfast). Metformin Administration Instructions:    Metformin ER 1,000 mg (2 x 500 mg tablets) daily with breakfast  Metformin  mg (1 x 500 mg tablet) daily with lunch  Metformin  mg (1 x 500 mg tablet) every evening   metFORMIN (GLUMETZA ER) 500 mg TG24 24 hour tablet 9/27/2017 at Unknown time Self Yes Yes   Sig: Take 500 mg by mouth daily (with lunch). Metformin Administration Instructions:    Metformin ER 1,000 mg (2 x 500 mg tablets) daily with breakfast  Metformin  mg (1 x 500 mg tablet) daily with lunch  Metformin  mg (1 x 500 mg tablet) every evening   metFORMIN (GLUMETZA ER) 500 mg TG24 24 hour tablet 9/27/2017 at Unknown time Self Yes Yes   Sig: Take 500 mg by mouth every evening.  Metformin Administration Instructions:    Metformin ER 1,000 mg (2 x 500 mg tablets) daily with breakfast  Metformin  mg (1 x 500 mg tablet) daily with lunch  Metformin  mg (1 x 500 mg tablet) every evening   potassium chloride (K-DUR, KLOR-CON) 20 mEq tablet 9/28/2017 at Unknown time Self Yes Yes   Sig: Take 20 mEq by mouth two (2) times a day. rosuvastatin (CRESTOR) 10 mg tablet 9/27/2017 at Unknown time Self Yes Yes   Sig: Take 10 mg by mouth nightly. spironolactone (ALDACTONE) 50 mg tablet 9/28/2017 at Unknown time Self No Yes   Sig: Take 1 Tab by mouth daily. valsartan-hydroCHLOROthiazide (DIOVAN HCT) 320-25 mg per tablet 9/28/2017 at Unknown time Self Yes Yes   Sig: Take 1 Tab by mouth daily.       Facility-Administered Medications: None          Thank you,  Viktoriya Santos, [facility]  Medication History Pharmacy Technician

## 2017-09-28 NOTE — H&P
Hospitalist Admission Note    NAME: Austen Squires   :  1960   MRN:  [id number]     Date/Time:  2017 12:46 PM    Patient PCP: Ivory Pike MD   Endocrinologist:  Dr. Ana Pham  ______________________________________________________________________   Assessment & Plan:  Recurrent hypokalemia 2.2  Recurrent hypochloremic hyponatremia Na 126  Acute thrombocytopenia platelet 49W with petechiae  Low back pain, bilateral leg pain from hip down to feet with new left leg weakness, may be from rhabdomyolysis due to severe hypokalemia, need to rule out leg DVT and spinal disease process  Resolving bronchitis, just finished steroid taper pack today  Tobacco abuse    HTN  Hypothyroid  DM type 2  Obesity  Bilateral lower leg edema, improved  Epigastric pain, distention    --nephrology consulted and as discussed with Dr. Eris Freeman no sign of renal potassium wasting based on prior urine potassium. Does not think hyperaldostonism, aldosterone <1, renin activity <0.167. Recommended endocrinology consult. --check TSH  --potassium replacement. Continue aldactone. She's unsure whether she is currently taking HCTZ   --no sign of infection. Check coags, monitor platelets. ?HIT. Get doppler US to eval for DVT, get CT lumbar spine, CT abdomen. --hold metformin for CT contrast.  Restart glipizide, add NPH 10 units bid, high dose SSI.  --hold statin, check CK. --tylenol and morphine prn leg pain. --hold norvasc due to severe feet swelling    Body mass index is 32.59 kg/(m^2). Code:  full  DVT prophylaxis:  SCD  Surrogate decision maker:          Subjective:   CHIEF COMPLAINT:  Bilateral leg pain \"from my hips down\"    HISTORY OF PRESENT ILLNESS:     Austen Squires is a 61 y.o.  female who presents with severe bilateral leg pain from hips down. Pain sharp >10/10, constant, better with IV fentanyl in ER to 8/10. Pain also better when moving/walking to distract her.   Denies tingling or numbness. Some pain in lower back too. Some leg weakness, unable to get up without help today. Difficulty lifting legs to put on pants. For several months now, notice difficulty rising from chair at work. Was hospitalized 2 weeks ago for leg swelling and found to have severe hypokalemia K+ <1.8, hyponatremia and contraction alkalosis, nausea and vomiting. She was told to stop HCTZ, started on aldactone. Currently unsure whether she has gone back to taking HCTZ. Also developed petechiae in legs while at hospital or soon after discharge and  notice large bruise on sternum today. Had fallen and scraped right knee recently but did not hit chest.     Just finished steroid taper pack for bronchitis today. Edema in legs has decreased from last admit. Was constipated after discharge but now resolving and appetite back to normal and bowels regular again. Weight fluctuated from 165 to 190, but now back down to 180s. We were asked to admit for work up and evaluation of the above problems. Past Medical History:   Diagnosis Date    Bronchitis     Diabetes ([facility] [address]     Endocrine disease     Hypertension     Other ill-defined conditions     high cholesterol      Past Surgical History:   Procedure Laterality Date    HX HEENT      tonsil    HX HEENT      eye ball decompression    HX ORTHOPAEDIC      carpal tunnel     Social History   Substance Use Topics    Smoking status: Current Every Day Smoker     Packs/day: 1.00    Smokeless tobacco: Never Used    Alcohol use Yes      Comment: socially   . History reviewed:  DM  Allergies   Allergen Reactions    Codeine Nausea and Vomiting    Darvocet A500 [Propoxyphene N-Acetaminophen] Nausea and Vomiting        Prior to Admission medications    Medication Sig Start Date End Date Taking? Authorizing Provider   lansoprazole (PREVACID) 15 mg capsule Take 30 mg by mouth Daily (before breakfast).    Yes Historical Provider   albuterol (VENTOLIN HFA) 90 mcg/actuation inhaler Take 2 Puffs by inhalation every four (4) hours as needed for Wheezing or Shortness of Breath. Yes Historical Provider   amLODIPine (NORVASC) 5 mg tablet Take 5 mg by mouth daily. Yes Historical Provider   cholecalciferol, VITAMIN D3, (VITAMIN D3) 5,000 unit tab tablet Take 5,000 Units by mouth daily. Yes Historical Provider   valsartan-hydroCHLOROthiazide (DIOVAN HCT) 320-25 mg per tablet Take 1 Tab by mouth daily. Yes Historical Provider   MULTIVIT-MINERALS/FOLIC ACID (WOMEN'S MULTIVITAMIN GUMMIES PO) Take 2 Caps by mouth daily. Yes Historical Provider   magnesium oxide (MAG-OX) 400 mg tablet Take 400 mg by mouth two (2) times a day. Yes Historical Provider   [facility] ER) 500 mg TG24 24 hour tablet Take 1,000 mg by mouth daily (with breakfast). Metformin Administration Instructions:    Metformin ER 1,000 mg (2 x 500 mg tablets) daily with breakfast  Metformin  mg (1 x 500 mg tablet) daily with lunch  Metformin  mg (1 x 500 mg tablet) every evening   Yes Historical Provider   metFORMIN (GLUMETZA ER) 500 mg TG24 24 hour tablet Take 500 mg by mouth daily (with lunch). Metformin Administration Instructions:    Metformin ER 1,000 mg (2 x 500 mg tablets) daily with breakfast  Metformin  mg (1 x 500 mg tablet) daily with lunch  Metformin  mg (1 x 500 mg tablet) every evening   Yes Historical Provider   metFORMIN (GLUMETZA ER) 500 mg TG24 24 hour tablet Take 500 mg by mouth every evening. Metformin Administration Instructions:    Metformin ER 1,000 mg (2 x 500 mg tablets) daily with breakfast  Metformin  mg (1 x 500 mg tablet) daily with lunch  Metformin  mg (1 x 500 mg tablet) every evening   Yes Historical Provider   guaiFENesin (ROBITUSSIN) 100 mg/5 mL liquid Take 100 mg by mouth nightly. Yes Historical Provider   potassium chloride (K-DUR, KLOR-CON) 20 mEq tablet Take 20 mEq by mouth two (2) times a day.    Yes Historical Provider   spironolactone (ALDACTONE) 50 mg tablet Take 1 Tab by mouth daily. 9/18/17  Yes Dariela Hall MD   fluticasone-vilanterol (BREO ELLIPTA) 100-25 mcg/dose inhaler Take 1 Puff by inhalation daily. 9/18/17  Yes Dariela Hall MD   albuterol-ipratropium (DUO-NEB) 2.5 mg-0.5 mg/3 ml nebu 3 mL by Nebulization route every four (4) hours as needed. 9/18/17  Yes Dariela Hall MD   levothyroxine (SYNTHROID) 100 mcg tablet Take 100 mcg by mouth Daily (before breakfast). Yes Ginger Forrester MD   rosuvastatin (CRESTOR) 10 mg tablet Take 10 mg by mouth nightly. Yes Ginger Forrester MD   aspirin 81 mg tablet Take 81 mg by mouth daily. Yes Ginger Forrester MD   glimepiride (AMARYL) 1 mg tablet Take 1 Tab by mouth Daily (before breakfast). 9/18/17   Dariela Hall MD     REVIEW OF SYSTEMS:  POSITIVE= Bold.   Negative = normal text  General:  fever, chills, sweats, generalized weakness, weight loss/gain, loss of appetite  Eyes:  blurred vision, eye pain, loss of vision, diplopia  Ear Nose and Throat:  rhinorrhea, pharyngitis  Respiratory:   cough, sputum production, SOB, wheezing, CAMPBELL, pleuritic pain  Cardiology:  chest pain, palpitations, orthopnea, PND, edema, syncope   Gastrointestinal:  abdominal pain, N/V, dysphagia, diarrhea, constipation, bleeding  Genitourinary:  frequency, urgency, dysuria, hematuria, incontinence  Muskuloskeletal :  arthralgia, myalgia  Hematology:  easy bruising, bleeding, lymphadenopathy  Dermatological:  Rash--petechiae on lower legs, large ecchymosis on chest; scab on right knee from fall, ulceration, pruritis  Endocrine:  hot flashes or polydipsia  Neurological:  headache, dizziness, confusion, focal weakness--legs L>>R, paresthesia, memory loss, gait disturbance  Psychological: anxiety, depression, agitation      Objective:   VITALS:    Visit Vitals    BP (!) 112/98    Pulse 84    Temp 98.5 °F (36.9 °C)    Resp 15    Ht 5' 3\" (1.6 m)    Wt 83.5 kg (184 lb)    SpO2 91%    BMI 32.59 kg/m2     Temp (24hrs), Av.5 °F (36.9 °C), Min:98.5 °F (36.9 °C), Max:98.5 °F (36.9 °C)    Body mass index is 32.59 kg/(m^2). PHYSICAL EXAM:    General:    Alert, obese cooperative, no distress, appears stated age. HEENT: Atraumatic, anicteric sclerae, pink conjunctivae     No oral ulcers, mucosa moist, throat clear. Hearing intact. Neck:  Supple, symmetrical,  thyroid: non tender  Lungs:   Coarse BS, Clear to auscultation bilaterally. No Wheezing or Rhonchi. No rales. Chest wall:  No tenderness  No Accessory muscle use. Heart:   Regular  rhythm,  No  murmur   No gallop. 3+ pitting edema feet. 1+ edema lower legs  Abdomen:   Protuberant, epigastric tenderness, tympanitic, mild distended, tympantic. Bowel sounds normal. No masses  Extremities: No cyanosis. No clubbing  Skin:     Not pale Not Jaundiced Large purple bruise on sternum (not from fall), scab in right knee, petechiae on lower legs and left thigh. B/l leg tattoos  Psych:  Good insight. Not depressed. Not anxious or agitated. Neurologic: EOMs intact. No facial asymmetry. No aphasia or slurred speech. Symmetrical strength 5/5 arms, right leg 4/5, left leg 3/5, Alert and oriented X 3.    Peripheral pulse: Bilateral, DP, 2+  Capillary refill:  normal    IMAGING RESULTS:   []       I have personally reviewed the actual   []     CXR  []     CT scan  CXR:  CT :  EKG:   ________________________________________________________________________  Care Plan discussed with:    Comments   Patient y    Family  y    RN     Care Manager                    Consultant:  angie Boone   ________________________________________________________________________  Prophylaxis:  GI PPI   DVT SCD   ________________________________________________________________________  Recommended Disposition:   Home with Family y   HH/PT/OT/RN y   SNF/LTC    [facility]    ________________________________________________________________________  Code Status:  Full Code y DNR/DNI    ________________________________________________________________________  TOTAL TIME:  60 minutes      Comments    y Reviewed previous records       ______________________________________________________________________  Giovanna Fischer MD      Procedures: see electronic medical records for all procedures/Xrays and details which were not copied into this note but were reviewed prior to creation of Plan. LAB DATA REVIEWED:    Recent Results (from the past 24 hour(s))   CBC WITH AUTOMATED DIFF    Collection Time: 09/28/17 10:29 AM   Result Value Ref Range    WBC 11.9 (H) 3.6 - 11.0 K/uL    RBC 4.10 3.80 - 5.20 M/uL    HGB 12.2 11.5 - 16.0 g/dL    HCT 33.5 (L) 35.0 - 47.0 %    MCV 81.7 80.0 - 99.0 FL    MCH 29.8 26.0 - 34.0 PG    MCHC 36.4 30.0 - 36.5 g/dL    RDW 13.1 11.5 - 14.5 %    PLATELET 74 (L) 655 - 400 K/uL    NEUTROPHILS 62 %    BAND NEUTROPHILS 12 %    LYMPHOCYTES 18 %    MONOCYTES 3 %    EOSINOPHILS 0 %    BASOPHILS 0 %    METAMYELOCYTES 2 %    MYELOCYTES 3 %    ABS. NEUTROPHILS 8.8 K/UL    ABS. LYMPHOCYTES 2.1 K/UL    ABS. MONOCYTES 0.4 K/UL    ABS. EOSINOPHILS 0.0 K/UL    ABS.  BASOPHILS 0.0 K/UL    RBC COMMENTS ANISOCYTOSIS  1+        WBC COMMENTS TOXIC GRANULATION      DF MANUAL     METABOLIC PANEL, BASIC    Collection Time: 09/28/17 10:29 AM   Result Value Ref Range    Sodium 126 (L) 136 - 145 mmol/L    Potassium 2.2 (LL) 3.5 - 5.1 mmol/L    Chloride 81 (L) 97 - 108 mmol/L    CO2 38 (H) 21 - 32 mmol/L    Anion gap 7 5 - 15 mmol/L    Glucose 211 (H) 65 - 100 mg/dL    BUN 10 6 - 20 MG/DL    Creatinine 0.51 (L) 0.55 - 1.02 MG/DL    BUN/Creatinine ratio 20 12 - 20      GFR est AA >60 >60 ml/min/1.73m2    GFR est non-AA >60 >60 ml/min/1.73m2    Calcium 8.3 (L) 8.5 - 10.1 MG/DL   MAGNESIUM    Collection Time: 09/28/17 10:29 AM   Result Value Ref Range    Magnesium 1.8 1.6 - 2.4 mg/dL

## 2017-09-28 NOTE — CONSULTS
Consultation Note    NAME: Erika Everett   :  1960   MRN:  [id number]     Date/Time:  2017 12:42 PM    I have been asked to see this patient by Dr. Juan Juárez  for advice/opinion re: Hyponatremia/hypokalemia. Assessment :    Plan:  Hyponatremia  Hypokalemia  Metabolic alkalosis  Thrombocytopenia  HTN  DM  H/o graves dz/hypothyroid   Sodium is now 126, K 2.2, bicarb 38; From 17 - low maile, high acth, urine potassium 17, urine osm 327    Clinical presentation, low maile, high acth and hyponatremia c/w AI; Low K is not c/w AI. Will check serum osm, urine osm again - last week definitely c/w a high ADH state; also had been on a thiazide diuretic    Urine potassium last week was not high (did not look like renal k wasting at the time). Will check a potassium:creatinine ratio. Unclear if she stopped HCTZ. IV and PO potassium replacement for now; will send a number of urine tests. Get notes and labs from Dr. Ayo Gilbert. Endocrine consult? Stop aldactone for now (not benefiting to low K and might be contributing to low sodium)       Subjective:   CHIEF COMPLAINT:  Hyponatremia/hypokalemia    HISTORY OF PRESENT ILLNESS:     Cherry Grover is a 61 y.o.   female who has a history of HTN, Dm. Ms. Porsha Coley states she was in good health until her Metformin dose was changed (did ok on xl, gets n/v otherwise). She was seen in the ER on 9/10 with COPD exacerbation. Given a steroid taper. She returned on  with angelita and leg numbness. She was found to have severe hyponatremia and hypokalemia. The above w/u labs were done. She returns now with progressive pain (sharp, cramping) to both legs. Compression hose helps. Tylenol and NSAID (aleve) has not helped. She denies n/v since last week. She had one episode of diarrhea this am. She has petichial lesions to legs (actually better now). She has just started using a walker to get around.     Past Medical History:   Diagnosis Date    Bronchitis     Diabetes ([facility] [address]  Endocrine disease     Hypertension     Other ill-defined conditions     high cholesterol      Past Surgical History:   Procedure Laterality Date    HX HEENT      tonsil    HX HEENT      eye ball decompression    HX ORTHOPAEDIC      carpal tunnel     Social History   Substance Use Topics    Smoking status: Current Every Day Smoker     Packs/day: 1.00    Smokeless tobacco: Never Used    Alcohol use Yes      Comment: socially      History reviewed. No pertinent family history. Allergies   Allergen Reactions    Codeine Nausea and Vomiting    Darvocet A500 [Propoxyphene N-Acetaminophen] Nausea and Vomiting      Prior to Admission medications    Medication Sig Start Date End Date Taking? Authorizing Provider   lansoprazole (PREVACID) 15 mg capsule Take 30 mg by mouth Daily (before breakfast). Yes Historical Provider   albuterol (VENTOLIN HFA) 90 mcg/actuation inhaler Take 2 Puffs by inhalation every four (4) hours as needed for Wheezing or Shortness of Breath. Yes Historical Provider   amLODIPine (NORVASC) 5 mg tablet Take 5 mg by mouth daily. Yes Historical Provider   cholecalciferol, VITAMIN D3, (VITAMIN D3) 5,000 unit tab tablet Take 5,000 Units by mouth daily. Yes Historical Provider   valsartan-hydroCHLOROthiazide (DIOVAN HCT) 320-25 mg per tablet Take 1 Tab by mouth daily. Yes Historical Provider   MULTIVIT-MINERALS/FOLIC ACID (WOMEN'S MULTIVITAMIN GUMMIES PO) Take 2 Caps by mouth daily. Yes Historical Provider   magnesium oxide (MAG-OX) 400 mg tablet Take 400 mg by mouth two (2) times a day. Yes Historical Provider   [facility] ER) 500 mg TG24 24 hour tablet Take 1,000 mg by mouth daily (with breakfast).  Metformin Administration Instructions:    Metformin ER 1,000 mg (2 x 500 mg tablets) daily with breakfast  Metformin  mg (1 x 500 mg tablet) daily with lunch  Metformin  mg (1 x 500 mg tablet) every evening   Yes Historical Provider   metFORMIN (GLUMETZA ER) 500 mg TG24 24 hour tablet Take 500 mg by mouth daily (with lunch). Metformin Administration Instructions:    Metformin ER 1,000 mg (2 x 500 mg tablets) daily with breakfast  Metformin  mg (1 x 500 mg tablet) daily with lunch  Metformin  mg (1 x 500 mg tablet) every evening   Yes Historical Provider   metFORMIN (GLUMETZA ER) 500 mg TG24 24 hour tablet Take 500 mg by mouth every evening. Metformin Administration Instructions:    Metformin ER 1,000 mg (2 x 500 mg tablets) daily with breakfast  Metformin  mg (1 x 500 mg tablet) daily with lunch  Metformin  mg (1 x 500 mg tablet) every evening   Yes Historical Provider   guaiFENesin (ROBITUSSIN) 100 mg/5 mL liquid Take 100 mg by mouth nightly. Yes Historical Provider   potassium chloride (K-DUR, KLOR-CON) 20 mEq tablet Take 20 mEq by mouth two (2) times a day. Yes Historical Provider   spironolactone (ALDACTONE) 50 mg tablet Take 1 Tab by mouth daily. 9/18/17  Yes Cricket Min MD   fluticasone-vilanterol (BREO ELLIPTA) 100-25 mcg/dose inhaler Take 1 Puff by inhalation daily. 9/18/17  Yes Cricket Min MD   albuterol-ipratropium (DUO-NEB) 2.5 mg-0.5 mg/3 ml nebu 3 mL by Nebulization route every four (4) hours as needed. 9/18/17  Yes Cricket Min MD   levothyroxine (SYNTHROID) 100 mcg tablet Take 100 mcg by mouth Daily (before breakfast). Yes Ginger Forrester MD   rosuvastatin (CRESTOR) 10 mg tablet Take 10 mg by mouth nightly. Yes Ginger Forrester MD   aspirin 81 mg tablet Take 81 mg by mouth daily. Yes Ginger Forrester MD   glimepiride (AMARYL) 1 mg tablet Take 1 Tab by mouth Daily (before breakfast).  9/18/17   Cricket Min MD     REVIEW OF SYSTEMS:     []  Unable to obtain reliable ROS due to  [] mental status  [] sedated   [] intubated   [x] Total of 12 systems reviewed as follows:  Constitutional: negative fever, negative chills, negative weight loss  Eyes:   negative visual changes  ENT:   negative sore throat, tongue or lip swelling  Respiratory: negative cough, negative dyspnea  Cards:  negative for chest pain, palpitations, + lower extremity edema  GI:   negative for nausea, vomiting, and abdominal pain  :  negative for frequency, dysuria  Integument:  negative for rash and pruritus  Heme:  negative for easy bruising and gum/nose bleeding  Musculoskel: negative for myalgias,  back pain; + muscle weakness  Neuro:  negative for headaches, dizziness, vertigo  Psych:  negative for feelings of anxiety, depression   Travel?: none    Objective:   VITALS:    Visit Vitals    BP (!) 112/98    Pulse 84    Temp 98.5 °F (36.9 °C)    Resp 15    Ht 5' 3\" (1.6 m)    Wt 83.5 kg (184 lb)    SpO2 91%    BMI 32.59 kg/m2     PHYSICAL EXAM:  Gen:  []  WD []  WN  [] cachectic []  thin [x]  obese []  disheveled             []  ill apearing  []   Critical  []   Chronic    [x]  No acute distress    HEENT:   [x] NC/AT/PERRLA/EOMI    [] pink conjunctivae      [] pale conjunctivae                  PERRL  [] yes  [] no      [] moist mucosa    [] dry mucosa    hearing intact to voice [x] yes  [] No                 NECK:   supple [] yes  [] no        masses [] yes  [] No               thyroid  []  non tender  []  tender    RESP:   [x] CTA bilaterally/no wheezing/rhonchi/rales/crackles    [] rhonchi bilaterally - no dullness  [] wheezing   [] rhonchi   [] crackles     use of accessory muscles [] yes [x] no    CARD:   [x]  regular rate and rhythm/No murmurs/rubs/gallops    murmur  [] yes ()  [] no      Rubs  [] yes  [] no       Gallops [] yes  [] no    Rate []  regular  []  irregular        carotid bruits  [] Right  []  Left                 LE edema [] yes  [x] no           JVP  []  yes   []  no    ABD:    [x] soft/non distended/non tender/+bowel sounds/no HSM    []  Rigid    tenderness [] yes [] no   Liver enlargement  []  yes []  no                Spleen enlargement  []  yes []  no     distended []  yes [] no     bowel sound  [] hypoactive   [] hyperactive    LYMPH:    Neck [] yes [x]  no       Axillae []  yes []  no    SKIN:   Rashes [x]  yes   []  no    Ulcers []  yes   []  no               [] tight to palpitation    skin turgor [x]  good  [] poor  [] decreased               Cyanosis/clubbing []  yes []  no    NEUR:   [x] cranial nerves II-XII grossly intact       [] Cranial nerves deficit                 []  facial droop    []  slurred speech   [] aphasic     [] Strength normal     []  weakness  []  LUE  []   RUE/ []  LLE  []   RLE    follows commands  [x]  yes []  no           PSYCH:   insight [] poor [x] good   Alert and Oriented to  [x] person  [x] place  [x]  time                    [] depressed [] anxious [] agitated  [] lethargic [] stuporous  [] sedated     LAB DATA REVIEWED:    Recent Labs      09/28/17   1029   WBC  11.9*   HGB  12.2   HCT  33.5*   PLT  74*     Recent Labs      09/28/17   1029   NA  126*   K  2.2*   CL  81*   CO2  38*   BUN  10   CREA  0.51*   GLU  211*   CA  8.3*   MG  1.8     No results for input(s): SGOT, GPT, ALT, AP, TBIL, TBILI, ALB, GLOB, GGT, AML, LPSE in the last 72 hours. No lab exists for component: AMYP, HLPSE  No results for input(s): INR, PTP, APTT in the last 72 hours. No lab exists for component: INREXT, INREXT   No results for input(s): FE, TIBC, PSAT, FERR in the last 72 hours. No results for input(s): PH, PCO2, PO2 in the last 72 hours. Recent Labs      09/28/17   1217   CPK  322*     Lab Results   Component Value Date/Time    Glucose (POC) 237 09/18/2017 11:36 AM    Glucose (POC) 168 09/18/2017 07:40 AM    Glucose (POC) 215 09/17/2017 04:41 PM    Glucose (POC) 306 09/17/2017 12:18 PM    Glucose (POC) 161 09/17/2017 08:11 AM       Procedures: see electronic medical records for all procedures/Xrays and details which were not copied into this note but were reviewed prior to creation of Plan. ________________________________________________________________________       ___________________________________________________  Consulting Physician:  Cindi Harps, MD

## 2017-09-28 NOTE — H&P
Discuss results with patient and  bedside. Notable is CT abdomen/pelvis with multiple hypodense lesions throughout the liver most likely representing metastases. 1.8 cm indeterminate left adrenal nodule, worrisome for metastasis given the above. Hypodense pancreas that appears somewhat indistinct with slight adjacent  stranding likely related to acute pancreatitis. Will get CT chest tomorrow given smoking hx. NPO after midnight for potential biopsy. If CT chest negative, would get biopsy of liver lesion. Will need to follow platelet tomorrow  And may need platelet transfusion. Get hematology consult    Could also consider GI consult given stranding near pancreas and significantly enlarged gastrohepatic lymph node. Villous adenoma can cause hypokalemia. She report having a colonoscopy a few years ago (?2-3 years) by Dr. Anny Feldman. Her GI doctor has retired.     Spoke to [address] and request for CT chest to be done by Bandar Batista MD

## 2017-09-28 NOTE — ED NOTES
TRANSFER - OUT REPORT:    Verbal report given to Gen Surg RN(name) on Emiliana Londono  being transferred to Gen Surg (unit) for routine progression of care       Report consisted of patients Situation, Background, Assessment and   Recommendations(SBAR). Information from the following report(s) SBAR, Kardex, ED Summary and MAR was reviewed with the receiving nurse. Lines:   Peripheral IV 09/28/17 Left Forearm (Active)   Site Assessment Clean, dry, & intact 9/28/2017 10:31 AM   Phlebitis Assessment 0 9/28/2017 10:31 AM   Infiltration Assessment 0 9/28/2017 10:31 AM   Dressing Status Clean, dry, & intact 9/28/2017 10:31 AM   Dressing Type 4 X 4;Transparent 9/28/2017 10:31 AM   Hub Color/Line Status Pink;Flushed 9/28/2017 10:31 AM   Action Taken Catheter retaped 9/28/2017 10:31 AM        Opportunity for questions and clarification was provided.

## 2017-09-28 NOTE — ED PROVIDER NOTES
[facility] [address]  EMERGENCY DEPARTMENT HISTORY AND PHYSICAL EXAM       Date of Service: 9/28/2017   Patient Name: Jaylin José   YOB: 1960  Medical Record Number: [id number]    History of Presenting Illness     Chief Complaint   Patient presents with    Leg Pain    Leg Swelling        History Provided By:  patient    Additional History:   Jaylin José is a 61 y.o. female with PMhx significant for HTN / DM / high cholesterol / bronchitis who presents via wheelchair to the ED with cc of intermittent BL cramping leg pain x weeks. Pt complains of associated BL leg swelling that has gradually worsened over the last week. She notes that her leg swelling is improved when wearing compression stockings and presents to the ED with the stockings on. Pt reports a recent admission to the ICU for hyponatremia noting that her Potassium and Magnesium levels were low. She endorses taking daily Potassium and Magnesium supplements as well as maintaining a high Potassium diet and drinking water frequently throughout the day. She also reports a recent history of bronchitis and notes that her leg swelling began shortly after treatment with Prednisone. Pt endorses taking daily Tylenol and Aleve today, both without relief of her leg pain. She notes that she has an appointment with her PCP tomorrow but did not feel that she could wait until then. Pt specifically denies nausea, vomiting, fever, chills, CP, or SOB. Social Hx: + Tobacco (1 ppd), + EtOH (socially), - Illicit Drugs    There are no other complaints, changes or physical findings at this time.     Primary Care Provider: Carlos Booker MD     Past History     Past Medical History:   Past Medical History:   Diagnosis Date    Bronchitis     Diabetes ([facility] [address]     Endocrine disease     Hypertension     Other ill-defined conditions     high cholesterol        Past Surgical History:   Past Surgical History:   Procedure Laterality Date    HX HEENT      tonsil    HX HEENT      eye ball decompression    HX ORTHOPAEDIC      carpal tunnel        Family History:   History reviewed. No pertinent family history. Social History:   Social History   Substance Use Topics    Smoking status: Current Every Day Smoker     Packs/day: 1.00    Smokeless tobacco: Never Used    Alcohol use Yes      Comment: socially        Allergies: Allergies   Allergen Reactions    Codeine Nausea and Vomiting    Darvocet A500 [Propoxyphene N-Acetaminophen] Nausea and Vomiting         Review of Systems   Review of Systems   Constitutional: Negative. Negative for chills, diaphoresis and fever. HENT: Negative. Negative for congestion, sore throat and trouble swallowing. Eyes: Negative. Negative for photophobia, pain and redness. Respiratory: Negative. Negative for cough, chest tightness, shortness of breath and wheezing. Cardiovascular: Positive for leg swelling. Negative for chest pain and palpitations. Gastrointestinal: Negative. Negative for abdominal pain, blood in stool, diarrhea, nausea and vomiting. Genitourinary: Negative for difficulty urinating, dysuria and frequency. Musculoskeletal: Negative for arthralgias, neck pain and neck stiffness. Positive for BL leg pain. Skin: Negative. Neurological: Negative. Negative for dizziness, tremors, seizures, syncope, speech difficulty, light-headedness and headaches. Psychiatric/Behavioral: Negative. Negative for confusion. The patient is not nervous/anxious. All other systems reviewed and are negative. Physical Exam  Physical Exam   Constitutional: She is oriented to person, place, and time. She appears well-developed and well-nourished. HENT:   Head: Normocephalic and atraumatic. Eyes: Conjunctivae and EOM are normal.   Neck: Normal range of motion. Neck supple. Cardiovascular: Normal rate and regular rhythm.     Pulmonary/Chest: Effort normal and breath sounds normal. No respiratory distress. Abdominal: Soft. She exhibits no distension. There is no tenderness. Musculoskeletal: Normal range of motion. Edema of BL legs with compression stockings on. No tenderness of feet or calves but positive tenderness diffuse over BL thighs. Neurological: She is alert and oriented to person, place, and time. Skin: Skin is warm and dry. Psychiatric: She has a normal mood and affect. Nursing note and vitals reviewed. Medical Decision Making   I am the first provider for this patient. I reviewed the vital signs, available nursing notes, past medical history, past surgical history, family history and social history. Old Medical Records: Old medical records. Nursing notes. Provider Notes:   Patient presents with lower leg edema and pain. DDx include Lymphedema, DVT, cellulitis, leg cramping 2/2 low K, CHF, ARF, liver failure, PAD. Most likely electrolyte related considering recent admission. ED Course:  10:33 AM   Initial assessment performed. The patients presenting problems have been discussed, and they are in agreement with the care plan formulated and outlined with them. I have encouraged them to ask questions as they arise throughout their visit. Progress Notes:     Progress Note:  11:25 AM  Pt was updated on lab results and agrees with the current plan of care. Written by Rigo Washington ED Scribe, as dictated by Agnieszka Walker M.D.    Shaw Rojas NOTE:   11:30 AM  Agnieszka Walker M.D spoke with Dr. Minnie Mcmillan,   Specialty: Hospitalist  Discussed pt's hx, disposition, and available diagnostic and imaging results. Reviewed care plans. Consultant will evaluate pt for admission. Written by Leonardo Massey ED Scribe, as dictated by Agnieszka Walker M.D.       Diagnostic Study Results     Labs -      Recent Results (from the past 12 hour(s))   CBC WITH AUTOMATED DIFF    Collection Time: 09/28/17 10:29 AM   Result Value Ref Range    WBC 11.9 (H) 3.6 - 11.0 K/uL    RBC 4.10 3.80 - 5.20 M/uL    HGB 12.2 11.5 - 16.0 g/dL    HCT 33.5 (L) 35.0 - 47.0 %    MCV 81.7 80.0 - 99.0 FL    MCH 29.8 26.0 - 34.0 PG    MCHC 36.4 30.0 - 36.5 g/dL    RDW 13.1 11.5 - 14.5 %    PLATELET 74 (L) 054 - 400 K/uL    NEUTROPHILS 62 %    BAND NEUTROPHILS 12 %    LYMPHOCYTES 18 %    MONOCYTES 3 %    EOSINOPHILS 0 %    BASOPHILS 0 %    METAMYELOCYTES 2 %    MYELOCYTES 3 %    ABS. NEUTROPHILS 8.8 K/UL    ABS. LYMPHOCYTES 2.1 K/UL    ABS. MONOCYTES 0.4 K/UL    ABS. EOSINOPHILS 0.0 K/UL    ABS. BASOPHILS 0.0 K/UL    RBC COMMENTS ANISOCYTOSIS  1+        WBC COMMENTS TOXIC GRANULATION      DF MANUAL     METABOLIC PANEL, BASIC    Collection Time: 09/28/17 10:29 AM   Result Value Ref Range    Sodium 126 (L) 136 - 145 mmol/L    Potassium 2.2 (LL) 3.5 - 5.1 mmol/L    Chloride 81 (L) 97 - 108 mmol/L    CO2 38 (H) 21 - 32 mmol/L    Anion gap 7 5 - 15 mmol/L    Glucose 211 (H) 65 - 100 mg/dL    BUN 10 6 - 20 MG/DL    Creatinine 0.51 (L) 0.55 - 1.02 MG/DL    BUN/Creatinine ratio 20 12 - 20      GFR est AA >60 >60 ml/min/1.73m2    GFR est non-AA >60 >60 ml/min/1.73m2    Calcium 8.3 (L) 8.5 - 10.1 MG/DL   MAGNESIUM    Collection Time: 09/28/17 10:29 AM   Result Value Ref Range    Magnesium 1.8 1.6 - 2.4 mg/dL       Radiologic Studies -  The following have been ordered and reviewed:  No orders to display     CT Results  (Last 48 hours)    None        CXR Results  (Last 48 hours)    None          Vital Signs-Reviewed the patient's vital signs.    Patient Vitals for the past 12 hrs:   Temp Pulse Resp BP SpO2   09/28/17 1015 98.5 °F (36.9 °C) 81 18 170/85 96 %       Medications Given in the ED:  Medications   potassium chloride 10 mEq in 100 ml IVPB (10 mEq IntraVENous New Bag 9/28/17 1127)   fentaNYL citrate (PF) injection 50 mcg (50 mcg IntraVENous Given 9/28/17 1038)   potassium chloride SR (KLOR-CON 10) tablet 40 mEq (40 mEq Oral Given 9/28/17 1127)   sodium chloride 0.9 % bolus infusion 1,000 mL (1,000 mL IntraVENous New Bag 9/28/17 1142)       Diagnosis   Clinical Impression:   1. Hypokalemia    2. Hyponatremia    3. Cramp of both lower extremities         Plan: Admit to Hospitalist    12:19 PM  Patient is being admitted to the hospital by Dr. Bharathi Hong. The results of their tests and reasons for their admission have been discussed with them and/or available family. They convey agreement and understanding for the need to be admitted and for their admission diagnosis. Consultation has been made with the inpatient physician specialist for hospitalization. This note is prepared by Ryland Villaseñor, acting as Scribe for Norberto Gillette M.D. Norberto Gillette M.D: The scribe's documentation has been prepared under my direction and personally reviewed by me in its entirety. I confirm that the note above accurately reflects all work, treatment, procedures, and medical decision making performed by me.

## 2017-09-29 PROBLEM — C79.9 METASTATIC CANCER (HCC): Status: ACTIVE | Noted: 2017-01-01

## 2017-09-29 NOTE — PROGRESS NOTES
Pharmacy Medication History    The patient was interviewed regarding current PTA medication list, use and drug allergies. Allergy Update:  none    Recommendations/Findings: The following amendments were made to the patient's active medication list on file at [facility]:   1)  Additions:       Fish Oil 1 cap daily    2)  Deletions:       none    3)  Changes:  Valsartan/HCTZ 320mg/25mg PO Daily changed to Valsartan 320mg Daily  Vitamin D3 changed from 5000units daily to 1000units daily    Note:  patient states she recalls advised to take Valsartan (no HCTZ) and prescription history substantiates this transition since her last hospital discharge. She also indicates that she has numerous previous medications at home which are \"difficult to assure she is taking the incorrect/discontinued medication\". The patient was questioned regarding use of any other inhalers, topical products, over the counter medications, herbal medications, vitamin products or ophthalmic/nasal/otic medication use. Prior to Admission Medications   Prescriptions Last Dose Informant Patient Reported? Taking? MULTIVIT-MINERALS/FOLIC ACID (WOMEN'S MULTIVITAMIN GUMMIES PO) 9/28/2017 at Unknown time Self Yes Yes   Sig: Take 2 Caps by mouth daily. albuterol (VENTOLIN HFA) 90 mcg/actuation inhaler 9/28/2017 at Unknown time Self Yes Yes   Sig: Take 2 Puffs by inhalation every four (4) hours as needed for Wheezing or Shortness of Breath. albuterol-ipratropium (DUO-NEB) 2.5 mg-0.5 mg/3 ml nebu 9/24/2017 at Unknown time Self No Yes   Sig: 3 mL by Nebulization route every four (4) hours as needed. amLODIPine (NORVASC) 5 mg tablet 9/28/2017 at Unknown time Self Yes Yes   Sig: Take 5 mg by mouth daily. aspirin 81 mg tablet 9/28/2017 at Unknown time Self Yes Yes   Sig: Take 81 mg by mouth daily. cholecalciferol (VITAMIN D3) 1,000 unit tablet   Yes Yes   Sig: Take 1,000 Units by mouth daily.    fish oil-omega-3 fatty acids 340-1,000 mg capsule   Yes Yes   Sig: Take 1 Cap by mouth daily. fluticasone-vilanterol (BREO ELLIPTA) 100-25 mcg/dose inhaler 9/28/2017 at Unknown time Self No Yes   Sig: Take 1 Puff by inhalation daily. glimepiride (AMARYL) 1 mg tablet Not Taking at Unknown time Self No No   Sig: Take 1 Tab by mouth Daily (before breakfast). guaiFENesin (ROBITUSSIN) 100 mg/5 mL liquid 9/27/2017 at Unknown time Self Yes Yes   Sig: Take 100 mg by mouth nightly. lansoprazole (PREVACID) 15 mg capsule 9/28/2017 at Unknown time Self Yes Yes   Sig: Take 30 mg by mouth Daily (before breakfast). levothyroxine (SYNTHROID) 88 mcg tablet   Yes Yes   Sig: Take 88 mcg by mouth Daily (before breakfast). magnesium oxide (MAG-OX) 400 mg tablet 9/28/2017 at Unknown time Self Yes Yes   Sig: Take 400 mg by mouth two (2) times a day. metFORMIN (GLUMETZA ER) 500 mg TG24 24 hour tablet 9/28/2017 at Unknown time Self Yes Yes   Sig: Take 1,000 mg by mouth daily (with breakfast). Metformin Administration Instructions:    Metformin ER 1,000 mg (2 x 500 mg tablets) daily with breakfast  Metformin  mg (1 x 500 mg tablet) daily with lunch  Metformin  mg (1 x 500 mg tablet) every evening   metFORMIN (GLUMETZA ER) 500 mg TG24 24 hour tablet 9/27/2017 at Unknown time Self Yes Yes   Sig: Take 500 mg by mouth daily (with lunch). Metformin Administration Instructions:    Metformin ER 1,000 mg (2 x 500 mg tablets) daily with breakfast  Metformin  mg (1 x 500 mg tablet) daily with lunch  Metformin  mg (1 x 500 mg tablet) every evening   metFORMIN (GLUMETZA ER) 500 mg TG24 24 hour tablet 9/27/2017 at Unknown time Self Yes Yes   Sig: Take 500 mg by mouth every evening.  Metformin Administration Instructions:    Metformin ER 1,000 mg (2 x 500 mg tablets) daily with breakfast  Metformin  mg (1 x 500 mg tablet) daily with lunch  Metformin  mg (1 x 500 mg tablet) every evening   potassium chloride (K-DUR, KLOR-CON) 20 mEq tablet 9/28/2017 at Unknown time Self Yes Yes   Sig: Take 20 mEq by mouth two (2) times a day. rosuvastatin (CRESTOR) 10 mg tablet 9/27/2017 at Unknown time Self Yes Yes   Sig: Take 10 mg by mouth nightly. spironolactone (ALDACTONE) 50 mg tablet 9/28/2017 at Unknown time Self No Yes   Sig: Take 1 Tab by mouth daily. valsartan (DIOVAN) 320 mg tablet   Yes Yes   Sig: Take 320 mg by mouth daily.       Facility-Administered Medications: None          Thank you,  Aj Styles, [facility]

## 2017-09-29 NOTE — PROGRESS NOTES
Visited by Jose Enrique Gifford Partner on 9/29/17.     RONALD Perez, [facility], [address]     Paging Service  [phone]

## 2017-09-29 NOTE — PROGRESS NOTES
NAME: Tayo Hester        :  1960        MRN:  [id number]        Assessment :    Plan:  --Hyponatremia  Hypokalemia  Metabolic alkalosis  Thrombocytopenia  HTN  DM  H/o graves dz/hypothyroid    CT worrisome for metastatic dz to liver/adrenal gland Urine studies still consistent with non-renal potassium loss; urine potassium 17 to 18; PO and IV K replacement today (K is better 2.2 to 2.7)    Sodium better 126 to 136, Serum osm 270, urine osm 335       Subjective:     Chief Complaint:  Feeling better. She is just back from CT of chest. Making a lot of urine. Pain and weakness both better. Review of Systems:    Symptom Y/N Comments  Symptom Y/N Comments   Fever/Chills    Chest Pain     Poor Appetite    Edema n    Cough    Abdominal Pain     Sputum    Joint Pain     SOB/CAMPBELL n   Pruritis/Rash     Nausea/vomit n   Tolerating PT/OT     Diarrhea    Tolerating Diet     Constipation    Other       Could not obtain due to:      Objective:     VITALS:   Last 24hrs VS reviewed since prior progress note. Most recent are:  Visit Vitals    /85 (BP 1 Location: Right arm, BP Patient Position: At rest)    Pulse 84    Temp 98.7 °F (37.1 °C)    Resp 18    Ht 5' 3\" (1.6 m)    Wt 83.5 kg (184 lb)    SpO2 95%    BMI 32.59 kg/m2     No intake or output data in the 24 hours ending 17 0613   Telemetry Reviewed:     PHYSICAL EXAM:  General: WD, WN. Alert, cooperative, no acute distress  Resp:  CTA Bilaterally. No Wheezing/Rhonchi/Rales. No access. muscle use  CV:  Regular  rhythm,  No murmur (), No Rubs, No Gallops.  No edema  GI:  Soft, Non distended, Non tender.  +Bowel sounds, no HSM      Lab Data Reviewed: (see below)    Medications Reviewed: (see below)    PMH/SH reviewed - no change compared to H&P  ________________________________________________________________________  Care Plan discussed with:  Patient y    Family      RN Care Manager                    Consultant:          Comments   >50% of visit spent in counseling and coordination of care       ________________________________________________________________________  Arturo Lira MD     Procedures: see electronic medical records for all procedures/Xrays and details which  were not copied into this note but were reviewed prior to creation of Plan. LABS:  Recent Labs      09/28/17   1029   WBC  11.9*   HGB  12.2   HCT  33.5*   PLT  74*     Recent Labs      09/28/17   1029   NA  126*   K  2.2*   CL  81*   CO2  38*   BUN  10   CREA  0.51*   GLU  211*   CA  8.3*   MG  1.8     Recent Labs      09/28/17   1319   LPSE  397*     Recent Labs      09/28/17   1319   INR  1.2*   PTP  11.7*   APTT  23.3      No results for input(s): FE, TIBC, PSAT, FERR in the last 72 hours. No results found for: FOL, RBCF   No results for input(s): PH, PCO2, PO2 in the last 72 hours.   Recent Labs      09/28/17   1217   CPK  322*     No components found for: [place]  Lab Results   Component Value Date/Time    Color YELLOW/STRAW 09/28/2017 01:19 PM    Appearance CLEAR 09/28/2017 01:19 PM    Specific gravity 1.016 09/28/2017 01:19 PM    pH (UA) 7.5 09/28/2017 01:19 PM    Protein 100 09/28/2017 01:19 PM    Glucose 500 09/28/2017 01:19 PM    Ketone NEGATIVE  09/28/2017 01:19 PM    Bilirubin NEGATIVE  09/28/2017 01:19 PM    Urobilinogen 1.0 09/28/2017 01:19 PM    Nitrites NEGATIVE  09/28/2017 01:19 PM    Leukocyte Esterase SMALL 09/28/2017 01:19 PM    Epithelial cells FEW 09/28/2017 01:19 PM    Bacteria NEGATIVE  09/28/2017 01:19 PM    WBC 0-4 09/28/2017 01:19 PM    RBC 0-5 09/28/2017 01:19 PM       MEDICATIONS:  Current Facility-Administered Medications   Medication Dose Route Frequency    sodium chloride (NS) flush 5-10 mL  5-10 mL IntraVENous Q8H    sodium chloride (NS) flush 5-10 mL  5-10 mL IntraVENous PRN    fluticasone-vilanterol (BREO ELLIPTA) 100mcg-25mcg/puff  1 Puff Inhalation DAILY    albuterol-ipratropium (DUO-NEB) 2.5 MG-0.5 MG/3 ML  3 mL Nebulization Q4H PRN    therapeutic multivitamin (THERAGRAN) tablet 1 Tab  1 Tab Oral DAILY    magnesium oxide (MAG-OX) tablet 400 mg  400 mg Oral BID    morphine injection 1 mg  1 mg IntraVENous Q4H PRN    insulin lispro (HUMALOG) injection   SubCUTAneous AC&HS    glucose chewable tablet 16 g  4 Tab Oral PRN    dextrose (D50W) injection syrg 12.5-25 g  12.5-25 g IntraVENous PRN    glucagon (GLUCAGEN) injection 1 mg  1 mg IntraMUSCular PRN    insulin NPH (NOVOLIN N, HUMULIN N) injection 10 Units  10 Units SubCUTAneous ACB&D    valsartan (DIOVAN) tablet 320 mg  320 mg Oral DAILY    potassium chloride (K-DUR, KLOR-CON) SR tablet 40 mEq  40 mEq Oral BID    pantoprazole (PROTONIX) tablet 40 mg  40 mg Oral ACB    levothyroxine (SYNTHROID) tablet 88 mcg  88 mcg Oral ACB

## 2017-09-29 NOTE — PROGRESS NOTES
General Surgery End of Shift Nursing Note    Bedside shift change report given to Tobin Osgood (oncoming nurse) by Franklin Marcum RN (offgoing nurse). Report included the following information SBAR, Kardex, Intake/Output, MAR and Recent Results. Shift worked:   Day shift   Significant changes during shift:    K+ 2.7   Non-emergent issues for physician to address:        Number times ambulated in hallway past shift: 0. Up in room. Number of times OOB to chair past shift: 3    Pain Management:  Current medication: morphine  Patient states pain is manageable on current pain medication: YES    GI:    Current diet:  DIET NPO Except Meds    Tolerating current diet: YES  Passing flatus: YES  Last Bowel Movement: yesterday   Appearance:     Respiratory:    Incentive Spirometer at bedside: NO  Patient instructed on use: NO    Patient Safety:    Falls Score: 1  Bed Alarm On? No  Sitter?  No    Lala Castellon

## 2017-09-29 NOTE — PROGRESS NOTES
** RE-ADMIT **     Pt is a 62 y/o  female admitted for hypokalemia, hyponatremia. Pt lives with her spouse in a one story home with no steps to the entrance. Pt is independent with ADLs to include driving. Pt has no previous HH or SNF providers. Pt has access to cane and rollator. Pt prefers to use [facility] on [address]. Pt will be transported by spouse at discharge. CM met with pt to complete initial assessment. Pt is alert and oriented to person, place,time and situation. CM will continue to follow pt to assist with discharge plans as needed. Care Management Interventions  PCP Verified by CM: Yes (Dr. Piter Pinto )  Mode of Transport at Discharge:  Other (see comment) (private vehicle )  Transition of Care Consult (CM Consult): Discharge Planning  Discharge Durable Medical Equipment: No (Pt has access to rolling walker )  Health Maintenance Reviewed: Yes  Physical Therapy Consult: No  Occupational Therapy Consult: No  Speech Therapy Consult: No  Current Support Network: Own Home, Lives with Spouse  Confirm Follow Up Transport: Self  Plan discussed with Pt/Family/Caregiver: Yes  Discharge Location  Discharge Placement: Home with family assistance    ADELAIDE Timmons, [address] [facility]   [phone]

## 2017-09-29 NOTE — PROGRESS NOTES
Hospitalist Progress Note    NAME: Melissa Pastrana   :  1960   MRN:  [id number]       Interim Hospital Summary: 61 y.o. female whom presented on 2017 with      Assessment / Plan:  Recurrent hypokalemia 2.2  Recurrent hypochloremic hyponatremia Na 126  Acute thrombocytopenia platelet 21P with petechiae  Low back pain, bilateral leg pain from hip down to feet with new left leg weakness  Resolving bronchitis, just finished steroid taper pack today  Tobacco abuse    HTN  Hypothyroid  DM type 2  Obesity  Bilateral lower leg edema, improved  Epigastric pain, distention       --nephrology consulted and as discussed with Dr. Fe Gibson no sign of renal potassium wasting based on prior urine potassium. Does not think hyperaldostonism, aldosterone <1, renin activity <0.167. Recommended endocrinology consult. srum cortisol levels 102( quite high) on admission   -- TSH-0.21, mildly low , do not think significant for profound hypothyroidism  --potassium replacement. Continue aldactone. She's unsure whether she is currently taking HCTZ - she was given 4 doses of iv kcl on  when pot was 2.2 and then again 4 more doses of iv kcl 10 meq each on   --no sign of infection. coags normal     platelets. 76 000, not significantly low to cause any bleeding    -  doppler US of the legs negative for any clots  ,CT lumbar spine shows minimal djd, nothing to cause the left leg weakness  CT abdomen. shows multiple liver nodules, hightly suggestive of mets  CT chest shows multiple nodules, seems like the primary is in the left lower lobe  CT guided biopsy has been ordered and waiting on the results  --hold metformin for CT contrast.  On glipizide, add NPH 10 units bid, high dose SSI.  --statins on hold fornow  --tylenol and morphine prn leg pain.   --hold norvasc due to severe feet swelling, which could also be from the severe hypoalbuminemia  - hyponatremia has resolved  - daily labs     Body mass index is 32.59 kg/(m^2).     Code:  full  DVT prophylaxis:  SCD  Surrogate decision maker:                             Subjective:     Chief Complaint / Reason for Physician Visit    Discussed with RN events overnight. Not sob, no wheezing ,   No abdominal or chest pain   Was anxious to get everything done  Did  Not mention any back pain , has been in bed    Review of Systems:  Symptom Y/N Comments  Symptom Y/N Comments   Fever/Chills n   Chest Pain n    Poor Appetite    Edema     Cough    Abdominal Pain n    Sputum    Joint Pain     SOB/CAMPBELL n   Pruritis/Rash     Nausea/vomit n   Tolerating PT/OT     Diarrhea    Tolerating Diet  Has been npo for all the testing   Constipation n   Other       Could NOT obtain due to:      Objective:     VITALS:   Last 24hrs VS reviewed since prior progress note. Most recent are:  Patient Vitals for the past 24 hrs:   Temp Pulse Resp BP SpO2   09/29/17 1131 97.7 °F (36.5 °C) 75 18 170/86 93 %   09/29/17 0925 - 80 18 159/85 94 %   09/29/17 0330 98.7 °F (37.1 °C) 84 18 149/85 95 %   09/28/17 1935 98.1 °F (36.7 °C) 84 16 154/71 95 %   09/28/17 1525 - 86 16 158/69 94 %       Intake/Output Summary (Last 24 hours) at 09/29/17 1450  Last data filed at 09/29/17 1221   Gross per 24 hour   Intake                0 ml   Output                0 ml   Net                0 ml        PHYSICAL EXAM:  General: WD, WN. Alert, cooperative, no acute distress    EENT:  EOMI. Anicteric sclerae. MMM  Resp:  CTA bilaterally, no wheezing or rales. No accessory muscle use  CV:  Regular  rhythm,  No edema  GI:  Soft, Non distended, Non tender.  +Bowel sounds  Neurologic:  Alert and oriented X 3, normal speech,   Psych:   Good insight. Not anxious nor agitated  Skin:  No rashes.   No jaundice    Reviewed most current lab test results and cultures  YES  Reviewed most current radiology test results   YES  Review and summation of old records today    NO  Reviewed patient's current orders and MAR    YES  PMH/SH reviewed - no change compared to H&P  ________________________________________________________________________  Care Plan discussed with:    Comments   Patient y    Family      RN y    Care Manager     Consultant                        Multidiciplinary team rounds were held today with , nursing, pharmacist and clinical coordinator. Patient's plan of care was discussed; medications were reviewed and discharge planning was addressed. ________________________________________________________________________  Total NON critical care TIME:  35   Minutes    Total CRITICAL CARE TIME Spent:   Minutes non procedure based      Comments   >50% of visit spent in counseling and coordination of care y    ________________________________________________________________________  Zoey Drake MD     Procedures: see electronic medical records for all procedures/Xrays and details which were not copied into this note but were reviewed prior to creation of Plan. LABS:  I reviewed today's most current labs and imaging studies.   Pertinent labs include:  Recent Labs      09/29/17   0605 09/28/17   1029   WBC  11.9*  11.9*   HGB  12.1  12.2   HCT  33.9*  33.5*   PLT  69*  74*     Recent Labs      09/29/17   0605 09/28/17   1319  09/28/17   1029   NA  136   --   126*   K  2.7*   --   2.2*   CL  94*   --   81*   CO2  35*   --   38*   GLU  86   --   211*   BUN  8   --   10   CREA  0.33*   --   0.51*   CA  7.9*   --   8.3*   MG  2.1   --   1.8   PHOS  2.0*   --    --    ALB  2.3*   --    --    TBILI  1.0   --    --    SGOT  175*   --    --    ALT  87*   --    --    INR   --   1.2*   --

## 2017-09-29 NOTE — PROGRESS NOTES
Problem: Pain  Goal: *Control of Pain  Outcome: Progressing Towards Goal  Patient's pain will remain under good control throughout this hospitalization.

## 2017-09-29 NOTE — PROGRESS NOTES
Brief Note      Consult request received. Will see her today.    Thanks      Signed by: Brittney Chacko MD                     September 29, 2017

## 2017-09-29 NOTE — CONSULTS
Oncology Consultation        Patient: Maegan Rosario MRN: [id number]  SSN: [id number]    YOB: 1960  Age: 61 y.o. Sex: female      Subjective:      Maegan Rosario is a 61 y.o. female who is admitted with abdominal pain. CT abdomen shows diffuse metastatic disease in the liver. CT chest shows disease in the mediastinal LNs and lung. She is chronic smoker. Denies bleeding per rectum. Last colonoscopy over 5 years ago. No loss of weight or bone pains. Review of Systems:    Constitutional: negative  Eyes: negative  Ears, Nose, Mouth, Throat, and Face: negative  Respiratory: negative  Cardiovascular: negative  Gastrointestinal: negative  Genitourinary:negative  Integument/Breast: negative  Hematologic/Lymphatic: negative  Musculoskeletal:negative  Neurological: negative    Past Medical History:   Diagnosis Date    Bronchitis     Diabetes ([facility] [address]     Endocrine disease     Hypertension     Other ill-defined conditions     high cholesterol     Past Surgical History:   Procedure Laterality Date    HX HEENT      tonsil    HX HEENT      eye ball decompression    HX ORTHOPAEDIC      carpal tunnel      History reviewed. No pertinent family history. Social History   Substance Use Topics    Smoking status: Current Every Day Smoker     Packs/day: 1.00    Smokeless tobacco: Never Used    Alcohol use Yes      Comment: socially      Prior to Admission medications    Medication Sig Start Date End Date Taking? Authorizing Provider   lansoprazole (PREVACID) 15 mg capsule Take 30 mg by mouth Daily (before breakfast). Yes Historical Provider   albuterol (VENTOLIN HFA) 90 mcg/actuation inhaler Take 2 Puffs by inhalation every four (4) hours as needed for Wheezing or Shortness of Breath. Yes Historical Provider   amLODIPine (NORVASC) 5 mg tablet Take 5 mg by mouth daily. Yes Historical Provider   cholecalciferol, VITAMIN D3, (VITAMIN D3) 5,000 unit tab tablet Take 5,000 Units by mouth daily. Yes Historical Provider   valsartan-hydroCHLOROthiazide (DIOVAN HCT) 320-25 mg per tablet Take 1 Tab by mouth daily. Yes Historical Provider   MULTIVIT-MINERALS/FOLIC ACID (WOMEN'S MULTIVITAMIN GUMMIES PO) Take 2 Caps by mouth daily. Yes Historical Provider   magnesium oxide (MAG-OX) 400 mg tablet Take 400 mg by mouth two (2) times a day. Yes Historical Provider   [facility]) 500 mg TG24 24 hour tablet Take 1,000 mg by mouth daily (with breakfast). Metformin Administration Instructions:    Metformin ER 1,000 mg (2 x 500 mg tablets) daily with breakfast  Metformin  mg (1 x 500 mg tablet) daily with lunch  Metformin  mg (1 x 500 mg tablet) every evening   Yes Historical Provider   metFORMIN (GLUMETZA ER) 500 mg TG24 24 hour tablet Take 500 mg by mouth daily (with lunch). Metformin Administration Instructions:    Metformin ER 1,000 mg (2 x 500 mg tablets) daily with breakfast  Metformin  mg (1 x 500 mg tablet) daily with lunch  Metformin  mg (1 x 500 mg tablet) every evening   Yes Historical Provider   metFORMIN (GLUMETZA ER) 500 mg TG24 24 hour tablet Take 500 mg by mouth every evening. Metformin Administration Instructions:    Metformin ER 1,000 mg (2 x 500 mg tablets) daily with breakfast  Metformin  mg (1 x 500 mg tablet) daily with lunch  Metformin  mg (1 x 500 mg tablet) every evening   Yes Historical Provider   guaiFENesin (ROBITUSSIN) 100 mg/5 mL liquid Take 100 mg by mouth nightly. Yes Historical Provider   potassium chloride (K-DUR, KLOR-CON) 20 mEq tablet Take 20 mEq by mouth two (2) times a day. Yes Historical Provider   levothyroxine (SYNTHROID) 88 mcg tablet Take 88 mcg by mouth Daily (before breakfast). Yes Historical Provider   spironolactone (ALDACTONE) 50 mg tablet Take 1 Tab by mouth daily. 9/18/17  Yes Duke Patricia MD   fluticasone-vilanterol (BREO ELLIPTA) 100-25 mcg/dose inhaler Take 1 Puff by inhalation daily.  9/18/17  Yes Pierre Prince MD   albuterol-ipratropium (DUO-NEB) 2.5 mg-0.5 mg/3 ml nebu 3 mL by Nebulization route every four (4) hours as needed. 9/18/17  Yes Hui Harden MD   rosuvastatin (CRESTOR) 10 mg tablet Take 10 mg by mouth nightly. Yes Phys Other, MD   aspirin 81 mg tablet Take 81 mg by mouth daily. Yes Phys Other, MD   glimepiride (AMARYL) 1 mg tablet Take 1 Tab by mouth Daily (before breakfast). 9/18/17   Hui Harden MD              Allergies   Allergen Reactions    Codeine Nausea and Vomiting    Darvocet A500 [Propoxyphene N-Acetaminophen] Nausea and Vomiting           Objective:     Vitals:    09/28/17 1525 09/28/17 1935 09/29/17 0330 09/29/17 0925   BP: 158/69 154/71 149/85 159/85   Pulse: 86 84 84 80   Resp: 16 16 18 18   Temp:  98.1 °F (36.7 °C) 98.7 °F (37.1 °C)    SpO2: 94% 95% 95% 94%   Weight:       Height:                Physical Exam:    GENERAL: alert, cooperative, no distress, appears stated age  EYE: negative  LYMPHATIC: Cervical, supraclavicular, and axillary nodes normal.   THROAT & NECK: normal and no erythema or exudates noted. LUNG: clear to auscultation bilaterally  HEART: regular rate and rhythm  ABDOMEN: soft, non-tender  EXTREMITIES:  no edema  SKIN: Normal.  NEUROLOGIC: negative        CT Results (most recent):    Results from Hospital Encounter encounter on 09/28/17   CT CHEST W CONT   Narrative INDICATION: multiple liver lesions suspicious for metastasis of unknown origin,  smoker, eval for lung cancer    COMPARISON: None    TECHNIQUE:  Following the uneventful intravenous administration of 100 cc  Isovue-300, 5 mm axial images were obtained through the chest. Coronal and  sagittal reconstructions were generated. CT dose reduction was achieved through  use of a standardized protocol tailored for this examination and automatic  exposure control for dose modulation. FINDINGS:    THYROID: No nodule. MEDIASTINUM: Prevascular nodes measure 1.8 x 2.7 cm and 2.0 x 2.7 cm.   Pretracheal node measures 1.2 x 1.3 cm. Right paratracheal nodes measure 1.5 x  2.0 cm, 2.5 x 2.8 cm, and the 4.3 x 2.9 cm. Additional precarinal node measures  2.2 x 2.2 cm. Anterior to the right mainstem bronchus origin, a node measures  3.5 x 2.5 cm. Subcarinal node measures 4.2 x 2.9 cm. ELISE: Right hilar node measures 1.8 x 2.5 cm. Left hilar node measures up to 1.8  x 1.6 cm. THORACIC AORTA: No dissection or aneurysm. MAIN PULMONARY ARTERY: Normal in caliber. TRACHEA/BRONCHI: Mild narrowing of the left mainstem bronchus. ESOPHAGUS: No wall thickening or dilatation. HEART: Normal in size. Minimal pericardial effusion. Coronary artery  calcification. PLEURA: Small left pleural effusion with low attenuation. LUNGS: Left lower lobe cluster of nodules measure up to 2.2 x 2.5 cm and 1.3 x  1.4 cm (images 34 through 47). INCIDENTALLY IMAGED UPPER ABDOMEN: Multiple hepatic lesions previously  described. Left adrenal 2 cm solid mass  BONES: No destructive bone lesion. Impression IMPRESSION:  Extensive mediastinal and hilar adenopathy and multiple central left lower lobe  pulmonary nodules, concerning for left lower lobe primary malignancy with  metastatic disease. Previously described multiple hepatic lesions and left adrenal mass.   Incidental coronary artery disease  Small left pleural transudate  Minimal pericardial effusion, too small to further characterize            Lab Results   Component Value Date/Time    WBC 11.9 09/29/2017 06:05 AM    HGB 12.1 09/29/2017 06:05 AM    HCT 33.9 09/29/2017 06:05 AM    PLATELET 69 47/29/0360 06:05 AM    MCV 82.5 09/29/2017 06:05 AM         Lab Results   Component Value Date/Time    Sodium 136 09/29/2017 06:05 AM    Potassium 2.7 09/29/2017 06:05 AM    Chloride 94 09/29/2017 06:05 AM    CO2 35 09/29/2017 06:05 AM    Anion gap 7 09/29/2017 06:05 AM    Glucose 86 09/29/2017 06:05 AM    BUN 8 09/29/2017 06:05 AM    Creatinine 0.33 09/29/2017 06:05 AM    BUN/Creatinine ratio 24 09/29/2017 06:05 AM    GFR est AA >60 09/29/2017 06:05 AM    GFR est non-AA >60 09/29/2017 06:05 AM    Calcium 7.9 09/29/2017 06:05 AM    Bilirubin, total 1.0 09/29/2017 06:05 AM    AST (SGOT) 175 09/29/2017 06:05 AM    Alk. phosphatase 248 09/29/2017 06:05 AM    Protein, total 5.4 09/29/2017 06:05 AM    Albumin 2.3 09/29/2017 06:05 AM    Globulin 3.1 09/29/2017 06:05 AM    A-G Ratio 0.7 09/29/2017 06:05 AM    ALT (SGPT) 87 09/29/2017 06:05 AM           Assessment:     1. Metastatic cancer: Unknown primary       Disease in the lungs, liver and abdominal LNs  Discussed the possibility with the patient  Will obtain bx of the liver  Further plans will depend on the results  May need GI eval. Can wait. Plan:       1. CT guided Bx of the liver  2. Tumor markers  3.  Will follow closely        Signed By: Elmer Long MD     September 29, 2017

## 2017-09-29 NOTE — PROGRESS NOTES
** Pt is a re-admit this CM will follow for duration of hospitalization **     Reuben Hoffman, [place], [address] [place] [address]   [phone]

## 2017-09-29 NOTE — PROGRESS NOTES
General Surgery End of Shift Nursing Note    Bedside shift change report given to Zoe Pate (oncoming nurse) by Mario Kessler (offgoing nurse). Report included the following information SBAR, Kardex and MAR. Shift worked:   7p-7a   Significant changes during shift:    Potassium runs given   Non-emergent issues for physician to address:   none     Number times ambulated in hallway past shift: 0    Number of times OOB to chair past shift: 0    Pain Management:  Current medication: morphine 1mg  Patient states pain is manageable on current pain medication: YES    GI:    Current diet:  DIET NPO Except Meds    Tolerating current diet: YES  Passing flatus:YES  Last Bowel Movement: several days ago   Appearance:     Patient Safety:    Falls Score: 2  Bed Alarm On? No  Sitter?  No    Hudson Bradley RN

## 2017-09-30 NOTE — PROGRESS NOTES
Hospitalist Progress Note    NAME: Jaylin José   :  1960   MRN:  [id number]       Interim Hospital Summary: 61 y.o. female whom presented on 2017 with      Assessment / Plan:  Recurrent hypokalemia 2.2  Recurrent hypochloremic hyponatremia Na 126  Suspected lung cancer with liver mets  Acute thrombocytopenia platelet 25V with petechiae  Low back pain, bilateral leg pain from hip down to feet with new left leg weakness  Resolving bronchitis, just finished steroid taper pack today  Tobacco abuse    HTN  Hypothyroid  DM type 2  Obesity  Bilateral lower leg edema, improved  Epigastric pain, distention        --nephrology consulted and as discussed with Dr. Frankie Boone no sign of renal potassium wasting based on prior urine potassium.  Does not think hyperaldostonism, aldosterone <1, renin activity <0.167.  Recommended endocrinology consult.    srum cortisol levels 102( quite high) on admission     Suspected elevated ACTH from the tumor, with high serum cortisol and recurrent persistent hypokalemia  Started on ketoconazole   Continue replacing potassium iv and po kcl     -- TSH-0.21, mildly low , do not think significant for profound hypothyroidism    --potassium replacement.  Continue aldactone.  She's unsure whether she is currently taking HCTZ - she was given 4 doses of iv kcl on  when pot was 2.2 and then again 4 more doses of iv kcl 10 meq each on , 4 more doses on , also on 30meq po tid   Does not seem to making any difference  --no sign of infection.    coags normal     platelets. 76 000, not significantly low to cause any bleeding    -  doppler US of the legs negative for any clots  ,CT lumbar spine shows minimal djd, nothing to cause the left leg weakness  CT abdomen. shows multiple liver nodules, hightly suggestive of mets  CT chest shows multiple nodules, seems like the primary is in the left lower lobe  CT guided biopsy has been ordered and waiting on the results, tis has been postponed to monday  --hold metformin for CT contrast.  On glipizide, add NPH 10 units bid, high dose SSI.  --statins on hold for now  --tylenol and morphine prn leg pain. --hold norvasc due to severe feet swelling, which could also be from the severe hypoalbuminemia  - hyponatremia has resolved  - daily labs    Constipation  Probably from the severe hypokalemia  Encouraged her to walk  Prn MOM    Leucocytosis  Secondary to steroids      Body mass index is 32.59 kg/(m^2).     Code:  full  DVT prophylaxis:  SCD  Surrogate decision maker: Silvia Dunn                        Subjective:     Chief Complaint / Reason for Physician Visit    Discussed with RN events overnight. Patient denies any chest pain , sob   Feels very bloated, has been passing a lot of gas but no bowel mmt    Review of Systems:  Symptom Y/N Comments  Symptom Y/N Comments   Fever/Chills n   Chest Pain n    Poor Appetite    Edema     Cough    Abdominal Pain n    Sputum    Joint Pain     SOB/CAMPBELL n   Pruritis/Rash     Nausea/vomit    Tolerating PT/OT     Diarrhea    Tolerating Diet y    Constipation    Other       Could NOT obtain due to:      Objective:     VITALS:   Last 24hrs VS reviewed since prior progress note. Most recent are:  Patient Vitals for the past 24 hrs:   Temp Pulse Resp BP SpO2   09/30/17 1454 98.1 °F (36.7 °C) (!) 116 16 (!) 139/99 96 %   09/30/17 1113 98.1 °F (36.7 °C) 92 16 164/86 97 %   09/30/17 0804 98.3 °F (36.8 °C) 74 16 180/89 96 %   09/29/17 2020 98.7 °F (37.1 °C) (!) 103 18 155/72 97 %   09/29/17 1705 98 °F (36.7 °C) (!) 101 18 162/74 95 %     No intake or output data in the 24 hours ending 09/30/17 1504     PHYSICAL EXAM:  General: WD, WN. Alert, cooperative, mild distress    EENT:  EOMI. Anicteric sclerae. MMM  Resp:  CTA bilaterally, no wheezing or rales.   No accessory muscle use  CV:  Regular  rhythm,  No edema  GI:  Soft, Non distended, Non tender.  +Bowel sounds  Neurologic:  Alert and oriented X 3, normal speech,   Psych:   Good insight. Not anxious nor agitated  Skin:  No rashes. No jaundice    Reviewed most current lab test results and cultures  YES  Reviewed most current radiology test results   YES  Review and summation of old records today    NO  Reviewed patient's current orders and MAR    YES  PMH/SH reviewed - no change compared to H&P  ________________________________________________________________________  Care Plan discussed with:    Comments   Patient y    Family      RN y    Care Manager     Consultant                        Multidiciplinary team rounds were held today with , nursing, pharmacist and clinical coordinator. Patient's plan of care was discussed; medications were reviewed and discharge planning was addressed. ________________________________________________________________________  Total NON critical care TIME: 25  Minutes    Total CRITICAL CARE TIME Spent:   Minutes non procedure based      Comments   >50% of visit spent in counseling and coordination of care     ________________________________________________________________________  Prudencnathan Stauffer MD     Procedures: see electronic medical records for all procedures/Xrays and details which were not copied into this note but were reviewed prior to creation of Plan. LABS:  I reviewed today's most current labs and imaging studies.   Pertinent labs include:  Recent Labs      09/30/17 0358 09/29/17   0605  09/28/17   1029   WBC  12.4*  11.9*  11.9*   HGB  12.1  12.1  12.2   HCT  33.8*  33.9*  33.5*   PLT  62*  69*  74*     Recent Labs      09/30/17   0358  09/29/17   0605  09/28/17   1319  09/28/17   1029   NA  140  136   --   126*   K  2.4*  2.7*   --   2.2*   CL  97  94*   --   81*   CO2  36*  35*   --   38*   GLU  112*  86   --   211*   BUN  12  8   --   10   CREA  0.41*  0.33*   --   0.51*   CA  8.2*  7.9*   --   8.3*   MG   --   2.1   --   1.8   PHOS  2.5*  2.0* --    --    ALB   --   2.3*   --    --    TBILI   --   1.0   --    --    SGOT   --   175*   --    --    ALT   --   87*   --    --    INR   --    --   1.2*   --

## 2017-09-30 NOTE — PROGRESS NOTES
General Surgery End of Shift Nursing Note    Bedside shift change report given to [address] (oncoming nurse) by Marilin العلي RN (offgoing nurse). Report included the following information SBAR, Kardex, Intake/Output, MAR and Recent Results. Shift worked: 7p-7a   Significant changes during shift:    K+ 2.4   Non-emergent issues for physician to address:        Number times ambulated in hallway past shift: 0. Up in room. Number of times OOB to chair past shift: 3    Pain Management:  Current medication: morphine  Patient states pain is manageable on current pain medication: YES    GI:    Current diet:  DIET DIABETIC CONSISTENT CARB Regular    Tolerating current diet: YES  Passing flatus: YES  Last Bowel Movement: yesterday   Appearance:     Respiratory:    Incentive Spirometer at bedside: NO  Patient instructed on use: NO    Patient Safety:    Falls Score: 1  Bed Alarm On? No  Sitter?  No    Lena Youssef

## 2017-09-30 NOTE — PROGRESS NOTES
NAME: Rita Meléndez        :  1960        MRN:  [id number]        Assessment :    Plan:  --Hyponatremia  Hypokalemia  Metabolic alkalosis  Thrombocytopenia  HTN  DM  H/o graves dz/hypothyroid    CT worrisome for primary lung cancer and metastatic dz to liver/adrenal gland Urine studies still consistent with non-renal potassium loss;     continue PO and IV K replacement today (K is down today 2.7 to 2.4)    I suspect ectopic ACTH production from lung mass and the very high cortisol levels as the etiology of the hypokalemic metabolic alkalosis; in the case studies, the hypokalemia is resistant to K replacement and RAAS blockers    Will try adrenal enzyme inh - ketoconazole    Resolved hyponatremia       Subjective:     Chief Complaint:  Feeling better. In good spirits. Making a lot of urine. Pain and weakness both better. We discussed the above. Review of Systems:    Symptom Y/N Comments  Symptom Y/N Comments   Fever/Chills    Chest Pain     Poor Appetite    Edema n    Cough    Abdominal Pain     Sputum    Joint Pain     SOB/CAMPBELL n   Pruritis/Rash     Nausea/vomit n   Tolerating PT/OT     Diarrhea    Tolerating Diet     Constipation    Other       Could not obtain due to:      Objective:     VITALS:   Last 24hrs VS reviewed since prior progress note. Most recent are:  Visit Vitals    /72 (BP 1 Location: Left arm, BP Patient Position: At rest)    Pulse (!) 103    Temp 98.7 °F (37.1 °C)    Resp 18    Ht 5' 3\" (1.6 m)    Wt 83.5 kg (184 lb)    SpO2 97%    BMI 32.59 kg/m2       Intake/Output Summary (Last 24 hours) at 17 0529  Last data filed at 17 1221   Gross per 24 hour   Intake                0 ml   Output                0 ml   Net                0 ml      Telemetry Reviewed:     PHYSICAL EXAM:  General: WD, WN. Alert, cooperative, no acute distress  Resp:  CTA Bilaterally. No Wheezing/Rhonchi/Rales.  No access. muscle use  CV:  Regular  rhythm,  No murmur (), No Rubs, No Gallops. No edema  GI:  Soft, Non distended, Non tender.  +Bowel sounds, no HSM      Lab Data Reviewed: (see below)    Medications Reviewed: (see below)    PMH/SH reviewed - no change compared to H&P  ________________________________________________________________________  Care Plan discussed with:  Patient y    Family      RN     Care Manager                    Consultant:          Comments   >50% of visit spent in counseling and coordination of care       ________________________________________________________________________  Ashutosh Paulson MD     Procedures: see electronic medical records for all procedures/Xrays and details which  were not copied into this note but were reviewed prior to creation of Plan. LABS:  Recent Labs      09/30/17 0358 09/29/17   0605   WBC  12.4*  11.9*   HGB  12.1  12.1   HCT  33.8*  33.9*   PLT  62*  69*     Recent Labs      09/30/17 0358 09/29/17 0605 09/28/17   1029   NA  140  136  126*   K  2.4*  2.7*  2.2*   CL  97  94*  81*   CO2  36*  35*  38*   BUN  12  8  10   CREA  0.41*  0.33*  0.51*   GLU  112*  86  211*   CA  8.2*  7.9*  8.3*   MG   --   2.1  1.8   PHOS  2.5*  2.0*   --      Recent Labs      09/29/17 0605 09/28/17   1319   SGOT  175*   --    AP  248*   --    TP  5.4*   --    ALB  2.3*   --    GLOB  3.1   --    LPSE  461*  397*     Recent Labs      09/28/17   1319   INR  1.2*   PTP  11.7*   APTT  23.3      No results for input(s): FE, TIBC, PSAT, FERR in the last 72 hours. No results found for: FOL, RBCF   No results for input(s): PH, PCO2, PO2 in the last 72 hours.   Recent Labs      09/28/17   1217   CPK  322*     No components found for: Yusef Point  Lab Results   Component Value Date/Time    Color YELLOW/STRAW 09/28/2017 01:19 PM    Appearance CLEAR 09/28/2017 01:19 PM    Specific gravity 1.016 09/28/2017 01:19 PM    pH (UA) 7.5 09/28/2017 01:19 PM    Protein 100 09/28/2017 01:19 PM Glucose 500 09/28/2017 01:19 PM    Ketone NEGATIVE  09/28/2017 01:19 PM    Bilirubin NEGATIVE  09/28/2017 01:19 PM    Urobilinogen 1.0 09/28/2017 01:19 PM    Nitrites NEGATIVE  09/28/2017 01:19 PM    Leukocyte Esterase SMALL 09/28/2017 01:19 PM    Epithelial cells FEW 09/28/2017 01:19 PM    Bacteria NEGATIVE  09/28/2017 01:19 PM    WBC 0-4 09/28/2017 01:19 PM    RBC 0-5 09/28/2017 01:19 PM       MEDICATIONS:  Current Facility-Administered Medications   Medication Dose Route Frequency    potassium chloride SR (KLOR-CON 10) tablet 30 mEq  30 mEq Oral BID    sodium chloride (NS) flush 5-10 mL  5-10 mL IntraVENous Q8H    sodium chloride (NS) flush 5-10 mL  5-10 mL IntraVENous PRN    fluticasone-vilanterol (BREO ELLIPTA) 100mcg-25mcg/puff  1 Puff Inhalation DAILY    albuterol-ipratropium (DUO-NEB) 2.5 MG-0.5 MG/3 ML  3 mL Nebulization Q4H PRN    therapeutic multivitamin (THERAGRAN) tablet 1 Tab  1 Tab Oral DAILY    magnesium oxide (MAG-OX) tablet 400 mg  400 mg Oral BID    morphine injection 1 mg  1 mg IntraVENous Q4H PRN    insulin lispro (HUMALOG) injection   SubCUTAneous AC&HS    glucose chewable tablet 16 g  4 Tab Oral PRN    dextrose (D50W) injection syrg 12.5-25 g  12.5-25 g IntraVENous PRN    glucagon (GLUCAGEN) injection 1 mg  1 mg IntraMUSCular PRN    insulin NPH (NOVOLIN N, HUMULIN N) injection 10 Units  10 Units SubCUTAneous ACB&D    valsartan (DIOVAN) tablet 320 mg  320 mg Oral DAILY    pantoprazole (PROTONIX) tablet 40 mg  40 mg Oral ACB    levothyroxine (SYNTHROID) tablet 88 mcg  88 mcg Oral ACB

## 2017-09-30 NOTE — PROGRESS NOTES
General Surgery End of Shift Nursing Note    Bedside shift change report given to *** (oncoming nurse) by [facility] (offgoing nurse). Report included the following information SBAR, Kardex, Intake/Output, MAR and Recent Results. Shift worked: Critical lab. Potassium 2.4 order obtained to infuse 100 Ml Potassium. Significant changes during shift:    ***   Non-emergent issues for physician to address:   ***     Number times ambulated in hallway past shift: {NUMBERS 0-5 [[id number]]}    Number of times OOB to chair past shift: {NUMBERS 0-5 [[id number]]}    Pain Management:  Current medication: ***  Patient states pain is manageable on current pain medication: {YES/NO:37641}    GI:    Current diet:  DIET DIABETIC CONSISTENT CARB Regular    Tolerating current diet: {YES/NO:14546}  Passing flatus: {YES/NO:31796}  Last Bowel Movement: {Time; today/yest/etc:33819}   Appearance: ***    Respiratory:    Incentive Spirometer at bedside: {YES/NO:07213}  Patient instructed on use: {YES/NO:53644}    Patient Safety:    Falls Score: {Numbers; 1-4 :91220615}  Bed Alarm On? {YES/NO:80929}  Sitter?  {YES/NO:73703}    [employer] System

## 2017-10-01 NOTE — PROGRESS NOTES
NAME: Markell Jones        :  1960        MRN:  [id number]        Assessment :    Plan:  --Hyponatremia  Hypokalemia  Metabolic alkalosis  Thrombocytopenia  HTN  DM  H/o graves dz/hypothyroid    CT worrisome for primary lung cancer and metastatic dz to liver/adrenal gland Continue PO K replacement today (K is better at 3)    I suspect ectopic ACTH production from lung mass and the very high cortisol levels as the etiology of the hypokalemic metabolic alkalosis; in the case studies, the hypokalemia is resistant to K replacement and RAAS blockers    on ketoconazole since     Resolved hyponatremia       Subjective:     Chief Complaint:  Feeling better. In good spirits. Making a lot of urine. Pain and weakness both better. We discussed the above. Not sure she understands the gravity of the situation. Review of Systems:    Symptom Y/N Comments  Symptom Y/N Comments   Fever/Chills    Chest Pain     Poor Appetite    Edema n    Cough    Abdominal Pain     Sputum    Joint Pain     SOB/CAMPBELL n   Pruritis/Rash     Nausea/vomit n   Tolerating PT/OT     Diarrhea    Tolerating Diet     Constipation    Other       Could not obtain due to:      Objective:     VITALS:   Last 24hrs VS reviewed since prior progress note. Most recent are:  Visit Vitals    BP (!) 139/99 (BP 1 Location: Right arm, BP Patient Position: At rest;Sitting)    Pulse (!) 116    Temp 98.1 °F (36.7 °C)    Resp 16    Ht 5' 3\" (1.6 m)    Wt 83.5 kg (184 lb)    SpO2 96%    BMI 32.59 kg/m2       Intake/Output Summary (Last 24 hours) at 10/01/17 2952  Last data filed at 17 1559   Gross per 24 hour   Intake             1320 ml   Output                0 ml   Net             1320 ml      Telemetry Reviewed:     PHYSICAL EXAM:  General: WD, WN. Alert, cooperative, no acute distress  Resp:  CTA Bilaterally. No Wheezing/Rhonchi/Rales. No access.  muscle use  CV:  Regular rhythm,  No murmur (), No Rubs, No Gallops. No edema  GI:  Soft, Non distended, Non tender.  +Bowel sounds, no HSM      Lab Data Reviewed: (see below)    Medications Reviewed: (see below)    PMH/SH reviewed - no change compared to H&P  ________________________________________________________________________  Care Plan discussed with:  Patient y    Family      RN     Care Manager                    Consultant:          Comments   >50% of visit spent in counseling and coordination of care       ________________________________________________________________________  Chase Vera MD     Procedures: see electronic medical records for all procedures/Xrays and details which  were not copied into this note but were reviewed prior to creation of Plan. LABS:  Recent Labs      09/30/17 0358 09/29/17   0605   WBC  12.4*  11.9*   HGB  12.1  12.1   HCT  33.8*  33.9*   PLT  62*  69*     Recent Labs      09/30/17 0358 09/29/17 0605 09/28/17   1029   NA  140  136  126*   K  2.4*  2.7*  2.2*   CL  97  94*  81*   CO2  36*  35*  38*   BUN  12  8  10   CREA  0.41*  0.33*  0.51*   GLU  112*  86  211*   CA  8.2*  7.9*  8.3*   MG   --   2.1  1.8   PHOS  2.5*  2.0*   --      Recent Labs      09/29/17   0605 09/28/17   1319   SGOT  175*   --    AP  248*   --    TP  5.4*   --    ALB  2.3*   --    GLOB  3.1   --    LPSE  461*  397*     Recent Labs      09/28/17   1319   INR  1.2*   PTP  11.7*   APTT  23.3      No results for input(s): FE, TIBC, PSAT, FERR in the last 72 hours. No results found for: FOL, RBCF   No results for input(s): PH, PCO2, PO2 in the last 72 hours.   Recent Labs      09/28/17   1217   CPK  322*     No components found for: Yusef Point  Lab Results   Component Value Date/Time    Color YELLOW/STRAW 09/28/2017 01:19 PM    Appearance CLEAR 09/28/2017 01:19 PM    Specific gravity 1.016 09/28/2017 01:19 PM    pH (UA) 7.5 09/28/2017 01:19 PM    Protein 100 09/28/2017 01:19 PM    Glucose 500 09/28/2017 01:19 PM Ketone NEGATIVE  09/28/2017 01:19 PM    Bilirubin NEGATIVE  09/28/2017 01:19 PM    Urobilinogen 1.0 09/28/2017 01:19 PM    Nitrites NEGATIVE  09/28/2017 01:19 PM    Leukocyte Esterase SMALL 09/28/2017 01:19 PM    Epithelial cells FEW 09/28/2017 01:19 PM    Bacteria NEGATIVE  09/28/2017 01:19 PM    WBC 0-4 09/28/2017 01:19 PM    RBC 0-5 09/28/2017 01:19 PM       MEDICATIONS:  Current Facility-Administered Medications   Medication Dose Route Frequency    ketoconazole (NIZORAL) tablet 200 mg  200 mg Oral BID    potassium chloride SR (KLOR-CON 10) tablet 30 mEq  30 mEq Oral TID    magnesium hydroxide (MILK OF MAGNESIA) 400 mg/5 mL oral suspension 30 mL  30 mL Oral Q6H PRN    sodium chloride (NS) flush 5-10 mL  5-10 mL IntraVENous Q8H    sodium chloride (NS) flush 5-10 mL  5-10 mL IntraVENous PRN    fluticasone-vilanterol (BREO ELLIPTA) 100mcg-25mcg/puff  1 Puff Inhalation DAILY    albuterol-ipratropium (DUO-NEB) 2.5 MG-0.5 MG/3 ML  3 mL Nebulization Q4H PRN    therapeutic multivitamin (THERAGRAN) tablet 1 Tab  1 Tab Oral DAILY    magnesium oxide (MAG-OX) tablet 400 mg  400 mg Oral BID    morphine injection 1 mg  1 mg IntraVENous Q4H PRN    insulin lispro (HUMALOG) injection   SubCUTAneous AC&HS    glucose chewable tablet 16 g  4 Tab Oral PRN    dextrose (D50W) injection syrg 12.5-25 g  12.5-25 g IntraVENous PRN    glucagon (GLUCAGEN) injection 1 mg  1 mg IntraMUSCular PRN    insulin NPH (NOVOLIN N, HUMULIN N) injection 10 Units  10 Units SubCUTAneous ACB&D    valsartan (DIOVAN) tablet 320 mg  320 mg Oral DAILY    pantoprazole (PROTONIX) tablet 40 mg  40 mg Oral ACB    levothyroxine (SYNTHROID) tablet 88 mcg  88 mcg Oral ACB

## 2017-10-01 NOTE — PROGRESS NOTES
Hospitalist Progress Note    NAME: Jaylin José   :  1960   MRN:  [id number]       Interim Hospital Summary: 61 y.o. female whom presented on 2017 with      Assessment / Plan:  Assessment / Plan:  Recurrent hypokalemia 2.2  Recurrent hypochloremic hyponatremia Na 126  Suspected lung cancer with liver mets  Acute thrombocytopenia platelet 34D with petechiae  Low back pain, bilateral leg pain from hip down to feet with new left leg weakness  Resolving bronchitis, just finished steroid taper pack today  Tobacco abuse    HTN  Hypothyroid  DM type 2  Obesity  Bilateral lower leg edema, improved  Epigastric pain, distention         --nephrology consulted and as discussed with Dr. Frankie Boone no sign of renal potassium wasting based on prior urine potassium.  Does not think hyperaldostonism, aldosterone <1, renin activity <0.167.  Recommended endocrinology consult.    srum cortisol levels 102( quite high) on admission     Suspected elevated ACTH from the tumor, with high serum cortisol and recurrent persistent hypokalemia  Started on ketoconazole   Continue replacing potassium iv and po kcl     10/1  Finally some headway with hypokalemia, k is 3 today  Shall recheck the levels this evening  Hopefully we can find a steady dose that will prevent hypokalema  She is also on ARB  Hemodynamically stable     -- TSH-0.21, mildly low , do not think significant for profound hypothyroidism     --potassium replacement.  Continue aldactone.  She's unsure whether she is currently taking HCTZ - she was given 4 doses of iv kcl on  when pot was 2.2 and then again 4 more doses of iv kcl 10 meq each on , 4 more doses on , also on 30meq po tid   Does not seem to making any difference    DATA   coags normal     platelets.  74 000, not significantly low to cause any bleeding    -  doppler US of the legs negative for any clots  ,CT lumbar spine shows minimal djd, nothing to cause the left leg weakness  CT abdomen. shows multiple liver nodules, hightly suggestive of mets  CT chest shows multiple nodules, seems like the primary is in the left lower lobe  CT guided biopsy has been ordered and waiting on the results, tis has been postponed to Monday    DIABETES  --hold metformin for CT contrast.  On glipizide, add NPH 10 units bid, high dose SSI. HLD  --statins on hold for now    LEG PAIN , ?muscular on admission   --tylenol and morphine prn leg pain. 10/1  Resolved    HTN  --hold norvasc due to severe feet swelling, which could also be from the severe     hypoalbuminemia    - hyponatremia   has resolved  - daily labs     Constipation  Probably from the severe hypokalemia  Encouraged her to walk  Prn MOM     Leucocytosis  Secondary to steroids      Body mass index is 32.59 kg/(m^2).     Code:  full  DVT prophylaxis:  SCD  Surrogate decision maker: Breann Cecil and son Villa Jones  I had a long discussion with patient for 40 min and her son Sara Bess, explaining her current condition and management, she is very talkative and full of energy, but has a good comprehension of her current condition , son seems intelligent, calm ,     I guess at this point ketoconazole seems to be working , so once the biopsy is done tomorrow , she may be discharged home with outpatient oncology follow up . Subjective:     Chief Complaint / Reason for Physician Visit    Discussed with RN events overnight. Up , walking around , comfortable    Review of Systems:  Symptom Y/N Comments  Symptom Y/N Comments   Fever/Chills n   Chest Pain n    Poor Appetite    Edema     Cough n   Abdominal Pain     Sputum    Joint Pain     SOB/CAMPBELL n   Pruritis/Rash     Nausea/vomit n   Tolerating PT/OT     Diarrhea    Tolerating Diet y    Constipation n   Other       Could NOT obtain due to:      Objective:     VITALS:   Last 24hrs VS reviewed since prior progress note.  Most recent are:  Patient Vitals for the past 24 hrs:   Temp Pulse Resp BP SpO2   10/01/17 0745 98.2 °F (36.8 °C) (!) 106 16 (!) 173/99 96 %   10/01/17 0708 - - - - 95 %   09/30/17 1454 98.1 °F (36.7 °C) (!) 116 16 (!) 139/99 96 %       Intake/Output Summary (Last 24 hours) at 10/01/17 1232  Last data filed at 10/01/17 0927   Gross per 24 hour   Intake             1440 ml   Output                0 ml   Net             1440 ml      PHYSICAL EXAM:  General:                    WD, WN. Alert, cooperative, mild distress    EENT:                                  EOMI. Anicteric sclerae. MMM  Resp:                                   CTA bilaterally, no wheezing or rales. No accessory muscle use  CV:                                      Regular  rhythm,  No edema  GI:                                       Soft, Non distended, Non tender.  +Bowel sounds  Neurologic:                Alert and oriented X 3, normal speech,   Psych:                       Good insight. Not anxious nor agitated  Skin:                                    No rashes. No jaundice      Reviewed most current lab test results and cultures  YES  Reviewed most current radiology test results   YES  Review and summation of old records today    NO  Reviewed patient's current orders and MAR    YES  PMH/SH reviewed - no change compared to H&P  ________________________________________________________________________  Care Plan discussed with:    Comments   Patient y    Family  y    RN y    Care Manager     Consultant                        Multidiciplinary team rounds were held today with , nursing, pharmacist and clinical coordinator. Patient's plan of care was discussed; medications were reviewed and discharge planning was addressed.      ________________________________________________________________________  Total NON critical care TIME:  50   Minutes    Total CRITICAL CARE TIME Spent:   Minutes non procedure based      Comments   >50% of visit spent in counseling and coordination of care y    ________________________________________________________________________  Trace Nguyen MD     Procedures: see electronic medical records for all procedures/Xrays and details which were not copied into this note but were reviewed prior to creation of Plan. LABS:  I reviewed today's most current labs and imaging studies.   Pertinent labs include:  Recent Labs      09/30/17   0358  09/29/17   0605   WBC  12.4*  11.9*   HGB  12.1  12.1   HCT  33.8*  33.9*   PLT  62*  69*     Recent Labs      10/01/17   0600  09/30/17   0358  09/29/17   0605  09/28/17   1319   NA  138  140  136   --    K  3.0*  2.4*  2.7*   --    CL  95*  97  94*   --    CO2  34*  36*  35*   --    GLU  165*  112*  86   --    BUN  11  12  8   --    CREA  0.43*  0.41*  0.33*   --    CA  8.2*  8.2*  7.9*   --    MG  2.0   --   2.1   --    PHOS   --   2.5*  2.0*   --    ALB   --    --   2.3*   --    TBILI   --    --   1.0   --    SGOT   --    --   175*   --    ALT   --    --   87*   --    INR   --    --    --   1.2*

## 2017-10-01 NOTE — ROUTINE PROCESS
Dr. Angel Kay made aware of patient's elevated blood pressure. Action give hydralazine 10mg iv every 6 hours as need for systolic >227. Continue to monitor.

## 2017-10-02 NOTE — PROGRESS NOTES
Progress Note        Patient: Saba Brewer MRN: [id number]  SSN: [id number]    YOB: 1960  Age: 61 y.o. Sex: female      Subjective:      Saba Brewer is a 61 y.o. female who is admitted with abdominal pain. CT abdomen shows diffuse metastatic disease in the liver. CT chest shows disease in the mediastinal LNs and lung. She is a chronic smoker. Denies bleeding per rectum. Last colonoscopy over 5 years ago. No loss of weight or bone pains. Ms. Serjio David had a liver biopsy today and has some discomfort at the puncture site. She is accompanied by her  and several family members. She is anxious to be discharged and go to the [facility] for the week. Review of Systems:    Constitutional: negative  Eyes: negative  Ears, Nose, Mouth, Throat, and Face: negative  Respiratory: negative  Cardiovascular: negative  Gastrointestinal: negative  Genitourinary:negative  Integument/Breast: negative  Hematologic/Lymphatic: negative  Musculoskeletal:negative  Neurological: negative      Past Medical History:   Diagnosis Date    Bronchitis     Diabetes ([facility] [address]     Endocrine disease     Hypertension     Other ill-defined conditions     high cholesterol     Past Surgical History:   Procedure Laterality Date    HX HEENT      tonsil    HX HEENT      eye ball decompression    HX ORTHOPAEDIC      carpal tunnel      History reviewed. No pertinent family history. Social History   Substance Use Topics    Smoking status: Current Every Day Smoker     Packs/day: 1.00    Smokeless tobacco: Never Used    Alcohol use Yes      Comment: socially      Prior to Admission medications    Medication Sig Start Date End Date Taking? Authorizing Provider   magnesium oxide (MAG-OX) 400 mg tablet Take 1 Tab by mouth two (2) times a day for 60 days. 10/2/17 12/1/17 Yes Rafael José MD   potassium chloride SR (KLOR-CON 10) 10 mEq tablet Take 3 Tabs by mouth three (3) times daily for 60 days.  10/2/17 12/1/17 Yes Gage Zhong MD   ketoconazole (NIZORAL) 200 mg tablet Take 1 Tab by mouth two (2) times a day for 60 days. 10/2/17 12/1/17 Yes Gage Zhong MD   cholecalciferol (VITAMIN D3) 1,000 unit tablet Take 1,000 Units by mouth daily. Yes Historical Provider   fish oil-omega-3 fatty acids 340-1,000 mg capsule Take 1 Cap by mouth daily. Yes Historical Provider   valsartan (DIOVAN) 320 mg tablet Take 320 mg by mouth daily. Yes Historical Provider   lansoprazole (PREVACID) 15 mg capsule Take 30 mg by mouth Daily (before breakfast). Yes Historical Provider   albuterol (VENTOLIN HFA) 90 mcg/actuation inhaler Take 2 Puffs by inhalation every four (4) hours as needed for Wheezing or Shortness of Breath. Yes Historical Provider   amLODIPine (NORVASC) 5 mg tablet Take 5 mg by mouth daily. Yes Historical Provider   MULTIVIT-MINERALS/FOLIC ACID (WOMEN'S MULTIVITAMIN GUMMIES PO) Take 2 Caps by mouth daily. Yes Historical Provider   [facility]) 500 mg TG24 24 hour tablet Take 1,000 mg by mouth daily (with breakfast). Metformin Administration Instructions:    Metformin ER 1,000 mg (2 x 500 mg tablets) daily with breakfast  Metformin  mg (1 x 500 mg tablet) daily with lunch  Metformin  mg (1 x 500 mg tablet) every evening   Yes Historical Provider   metFORMIN (GLUMETZA ER) 500 mg TG24 24 hour tablet Take 500 mg by mouth daily (with lunch). Metformin Administration Instructions:    Metformin ER 1,000 mg (2 x 500 mg tablets) daily with breakfast  Metformin  mg (1 x 500 mg tablet) daily with lunch  Metformin  mg (1 x 500 mg tablet) every evening   Yes Historical Provider   metFORMIN (GLUMETZA ER) 500 mg TG24 24 hour tablet Take 500 mg by mouth every evening.  Metformin Administration Instructions:    Metformin ER 1,000 mg (2 x 500 mg tablets) daily with breakfast  Metformin  mg (1 x 500 mg tablet) daily with lunch  Metformin  mg (1 x 500 mg tablet) every evening   Yes Historical Provider   guaiFENesin (ROBITUSSIN) 100 mg/5 mL liquid Take 100 mg by mouth nightly. Yes Historical Provider   potassium chloride (K-DUR, KLOR-CON) 20 mEq tablet Take 20 mEq by mouth two (2) times a day. Yes Historical Provider   levothyroxine (SYNTHROID) 88 mcg tablet Take 88 mcg by mouth Daily (before breakfast). Yes Historical Provider   spironolactone (ALDACTONE) 50 mg tablet Take 1 Tab by mouth daily. 9/18/17  Yes Nathalie Forrester MD   fluticasone-vilanterol (BREO ELLIPTA) 100-25 mcg/dose inhaler Take 1 Puff by inhalation daily. 9/18/17  Yes Nathalie Forrester MD   albuterol-ipratropium (DUO-NEB) 2.5 mg-0.5 mg/3 ml nebu 3 mL by Nebulization route every four (4) hours as needed. 9/18/17  Yes Nathalie Forrester MD   rosuvastatin (CRESTOR) 10 mg tablet Take 10 mg by mouth nightly. Yes Ginger Forrester MD   aspirin 81 mg tablet Take 81 mg by mouth daily. Yes Ginger Forrester MD   glimepiride (AMARYL) 1 mg tablet Take 1 Tab by mouth Daily (before breakfast). 9/18/17   Nathalie Forrester MD              Allergies   Allergen Reactions    Codeine Nausea and Vomiting    Darvocet A500 [Propoxyphene N-Acetaminophen] Nausea and Vomiting           Objective:     Vitals:    10/02/17 1100 10/02/17 1140 10/02/17 1230 10/02/17 1306   BP: 160/72 (!) 161/102  (!) 129/92   Pulse: 77 90 74 95   Resp: 18 16 16 16   Temp:   97.5 °F (36.4 °C) 98.6 °F (37 °C)   SpO2: 93% 93% 96% 95%   Weight:       Height:                Physical Exam:    GENERAL: alert, cooperative, no distress, appears stated age  EYE: negative  LYMPHATIC: Cervical, supraclavicular, and axillary nodes normal.   THROAT & NECK: normal and no erythema or exudates noted.    LUNG: clear to auscultation bilaterally  HEART: regular rate and rhythm  ABDOMEN: soft, non-tender  EXTREMITIES:  no edema  SKIN: Normal.  NEUROLOGIC: negative        CT Results (most recent):    Results from Hospital Encounter encounter on 09/28/17   CT HEAD WO CONT   Narrative EXAM:  CT HEAD WITHOUT CONTRAST  INDICATION: Change in mentation. COMPARISON: None. CONTRAST: None. TECHNIQUE: Unenhanced CT of the head was performed using 5 mm images. Brain and  bone windows were generated. Sagittal and coronal reformations were generated. CT dose reduction was achieved through use of a standardized protocol tailored  for this examination and automatic exposure control for dose modulation. CT dose  reduction was achieved through use of a standardized protocol tailored for this  examination and automatic exposure control for dose modulation. FINDINGS:  The ventricles and sulci are normal in size, shape and configuration and  midline. There is minimal atherosclerotic calcification of the carotid arteries. There is no significant white matter disease. There is no intracranial  hemorrhage. There is no extra-axial collection, mass, mass effect or midline  shift. The basilar cisterns are open. No acute infarct is identified. The bone  windows demonstrate no abnormalities. The visualized portions of the paranasal  sinuses and mastoid air cells are clear. Impression IMPRESSION: No acute intracranial abnormality. Lab Results   Component Value Date/Time    WBC 12.4 09/30/2017 03:58 AM    HGB 12.1 09/30/2017 03:58 AM    HCT 33.8 09/30/2017 03:58 AM    PLATELET 62 54/09/5803 03:58 AM    MCV 83.5 09/30/2017 03:58 AM         Lab Results   Component Value Date/Time    Sodium 135 10/02/2017 04:50 AM    Potassium 3.6 10/02/2017 04:50 AM    Chloride 95 10/02/2017 04:50 AM    CO2 31 10/02/2017 04:50 AM    Anion gap 9 10/02/2017 04:50 AM    Glucose 141 10/02/2017 04:50 AM    BUN 10 10/02/2017 04:50 AM    Creatinine 0.45 10/02/2017 04:50 AM    BUN/Creatinine ratio 22 10/02/2017 04:50 AM    GFR est AA >60 10/02/2017 04:50 AM    GFR est non-AA >60 10/02/2017 04:50 AM    Calcium 8.1 10/02/2017 04:50 AM    Bilirubin, total 1.0 09/29/2017 06:05 AM    AST (SGOT) 175 09/29/2017 06:05 AM    Alk.  phosphatase 248 09/29/2017 06:05 AM    Protein, total 5.4 09/29/2017 06:05 AM    Albumin 2.3 09/29/2017 06:05 AM    Globulin 3.1 09/29/2017 06:05 AM    A-G Ratio 0.7 09/29/2017 06:05 AM    ALT (SGPT) 87 09/29/2017 06:05 AM           Assessment:     1. Metastatic cancer: Unknown primary       Disease in the lungs, liver and abdominal LNs  Discussed the possibility with the patient    Liver biopsy today - await final pathology    Tumor markers - CEA, AFP normal, CA 19-9 elevated at 117  May need GI eval.        Plan:       > Awaiting final pathology  > Follow-up appointment on Thursday 10/12 at 3:30pm      Signed By: Samuel Ricketts NP     October 2, 2017            Attending Physician Note:     I have reviewed the history, physical examination, assessment and the plan of the care of the patient. I saw the patient with Wendy Glover and I participated in the examination and critical decision making. I agree with the note as stated above. Ms. Jenny Mccoy is a lady with newly diagnosed metastatic cancer of unknown origin. Biopsy has been performed. We shall see her in 2 weeks to discuss the next steps.         Signed by: Sujey Cast MD                     October 2, 2017

## 2017-10-02 NOTE — PROGRESS NOTES
Patient back on floor from ct. Patient instructed to stay in bed and try not to walk around. Educated the patient and family re: the importance of staying in bed.   Patient has low platelets and is at high risk for bleeding because of biopsy and low platelets

## 2017-10-02 NOTE — DISCHARGE SUMMARY
Hospitalist Discharge Summary     Patient ID:  Bettie Soulier  [id number]  24 y.o.  1960    PCP on record: Heike Gomez MD    Admit date: 9/28/2017  Discharge date and time: 10/2/2017      DISCHARGE DIAGNOSIS:    Metastatic cancer of unknown primary  Hypokalemia metabolic alkalosis   Hyponatremia   Thrombocytopenia  Bronchitis POA  Tobacco abuse  HTN  DM  Hypothyroidism, Hx graves  Obesity  Body mass index is 32.59 kg/(m^2). CONSULTATIONS:  IP CONSULT TO ONCOLOGY  IP CONSULT TO INTERVENTIONAL RADIOLOGY    Excerpted HPI from H&P of Kiera Horton MD:  CHIEF COMPLAINT:  Bilateral leg pain \"from my hips down\"     HISTORY OF PRESENT ILLNESS:     Bettie Soulier is a 61 y.o.  female who presents with severe bilateral leg pain from hips down. Pain sharp >10/10, constant, better with IV fentanyl in ER to 8/10. Pain also better when moving/walking to distract her. Denies tingling or numbness. Some pain in lower back too. Some leg weakness, unable to get up without help today. Difficulty lifting legs to put on pants. For several months now, notice difficulty rising from chair at work.     Was hospitalized 2 weeks ago for leg swelling and found to have severe hypokalemia K+ <1.8, hyponatremia and contraction alkalosis, nausea and vomiting. She was told to stop HCTZ, started on aldactone. Currently unsure whether she has gone back to taking HCTZ.       Also developed petechiae in legs while at hospital or soon after discharge and  notice large bruise on sternum today. Had fallen and scraped right knee recently but did not hit chest.      Just finished steroid taper pack for bronchitis today. Edema in legs has decreased from last admit. Was constipated after discharge but now resolving and appetite back to normal and bowels regular again. Weight fluctuated from 165 to 190, but now back down to 180s.      We were asked to admit for work up and evaluation of the above problems. ______________________________________________________________________  DISCHARGE SUMMARY/HOSPITAL COURSE:  for full details see H&P, daily progress notes, labs, consult notes. Patient presents with abd, back, and leg pain with significant electrolyte abnormalities. Work up below demonstrates what appears to be metastatic disease of unknown primary. Ultimately a liver biopsy was completed. Results pending. Patient request to be discharge as she has arranged for a trip to the [place] and she plans to return in a week to follow up with Oncology. Renal suspect ectopic ACTH production from lung mass and the very high cortisol levels as the etiology of the hypokalemic metabolic alkalosis and started patient on ketoconazole with K and Mg supplements. She was advised to hold ASA due to low platelets and hold metformin / statin due to liver mets. Liver biopsy pending. Patient will follow up with Dr Jon Pedro in 1-2 weeks. Recommend Endocrine referral as OP. SUMMARY of work up:     CT head nothing acute  CT chest   IMPRESSION:  Extensive mediastinal and hilar adenopathy and multiple central left lower lobe  pulmonary nodules, concerning for left lower lobe primary malignancy with  metastatic disease. Previously described multiple hepatic lesions and left adrenal mass. Incidental coronary artery disease  Small left pleural transudate  Minimal pericardial effusion, too small to further characterize    CT abdomen  IMPRESSION:  1. Multiple hypodense lesions throughout the liver most likely representing  metastases. 2. 1.8 cm indeterminate left adrenal nodule, worrisome for metastasis given the  above. 3. Hypodense pancreas that appears somewhat indistinct with slight adjacent  stranding likely related to acute pancreatitis. 4. Significantly enlarged gastrohepatic lymph node. 5. Small area of consolidation in the medial left lung base. CT lumbar spine  IMPRESSION:     1.  Mild spondylosis at T11-12. No significant spinal stenosis or neural  foraminal narrowing. 2. Multilevel posterior facet arthropathy in the lower lumbar spine. 3. 1.6 cm indeterminate left adrenal gland nodule. 4. Small area of consolidation in the inferior medial left lung base.       CA 19-9 117 (high)  CEA 2.1  AFP  2.9  ACTH  337  Cortisol  104  Ildefonso / Renin activity <0.167      _______________________________________________________________________  Patient seen and examined by me on discharge day. Pertinent Findings:  Gen:    Not in distress  Chest: Clear lungs  CVS:   Regular rhythm. No edema  Abd:  Soft, not distended, not tender  Neuro:  Alert, Oriented x 4, grossly non focal exam  _______________________________________________________________________  DISCHARGE MEDICATIONS:   Current Discharge Medication List      START taking these medications    Details   potassium chloride SR (KLOR-CON 10) 10 mEq tablet Take 3 Tabs by mouth three (3) times daily for 60 days. Qty: 270 Tab, Refills: 1      ketoconazole (NIZORAL) 200 mg tablet Take 1 Tab by mouth two (2) times a day for 60 days. Qty: 60 Tab, Refills: 1      oxyCODONE IR (ROXICODONE) 5 mg immediate release tablet Take 1 Tab by mouth every four (4) hours as needed for Pain. Max Daily Amount: 30 mg.  Qty: 30 Tab, Refills: 0         CONTINUE these medications which have CHANGED    Details   magnesium oxide (MAG-OX) 400 mg tablet Take 1 Tab by mouth two (2) times a day for 60 days. Qty: 60 Tab, Refills: 1         CONTINUE these medications which have NOT CHANGED    Details   cholecalciferol (VITAMIN D3) 1,000 unit tablet Take 1,000 Units by mouth daily. fish oil-omega-3 fatty acids 340-1,000 mg capsule Take 1 Cap by mouth daily. valsartan (DIOVAN) 320 mg tablet Take 320 mg by mouth daily. lansoprazole (PREVACID) 15 mg capsule Take 30 mg by mouth Daily (before breakfast).       albuterol (VENTOLIN HFA) 90 mcg/actuation inhaler Take 2 Puffs by inhalation every four (4) hours as needed for Wheezing or Shortness of Breath. amLODIPine (NORVASC) 5 mg tablet Take 5 mg by mouth daily. MULTIVIT-MINERALS/FOLIC ACID (WOMEN'S MULTIVITAMIN GUMMIES PO) Take 2 Caps by mouth daily. levothyroxine (SYNTHROID) 88 mcg tablet Take 88 mcg by mouth Daily (before breakfast). fluticasone-vilanterol (BREO ELLIPTA) 100-25 mcg/dose inhaler Take 1 Puff by inhalation daily. Qty: 1 Inhaler, Refills: 0      albuterol-ipratropium (DUO-NEB) 2.5 mg-0.5 mg/3 ml nebu 3 mL by Nebulization route every four (4) hours as needed. Qty: 100 Nebule, Refills: 0      glimepiride (AMARYL) 1 mg tablet Take 1 Tab by mouth Daily (before breakfast). Qty: 30 Tab, Refills: 0         STOP taking these medications       metFORMIN (GLUMETZA ER) 500 mg TG24 24 hour tablet Comments:   Reason for Stopping:         metFORMIN (GLUMETZA ER) 500 mg TG24 24 hour tablet Comments:   Reason for Stopping:         metFORMIN (GLUMETZA ER) 500 mg TG24 24 hour tablet Comments:   Reason for Stopping:         guaiFENesin (ROBITUSSIN) 100 mg/5 mL liquid Comments:   Reason for Stopping:         potassium chloride (K-DUR, KLOR-CON) 20 mEq tablet Comments:   Reason for Stopping:         spironolactone (ALDACTONE) 50 mg tablet Comments:   Reason for Stopping:         rosuvastatin (CRESTOR) 10 mg tablet Comments:   Reason for Stopping:         aspirin 81 mg tablet Comments:   Reason for Stopping:         guaiFENesin (ROBITUSSIN) 100 mg/5 mL liquid Comments:   Reason for Stopping:         omeprazole (PRILOSEC) 40 mg capsule Comments:   Reason for Stopping:               My Recommended Diet, Activity, Wound Care, and follow-up labs are listed in the patient's Discharge Insturctions which I have personally completed and reviewed.     _______________________________________________________________________  DISPOSITION:    Home with Family: x   Home with HH/PT/OT/RN:    SNF/LTC:    CARROLL:    OTHER:        Condition at Discharge:  Stable  _______________________________________________________________________  Follow up with:   PCP : Isabelle Greer MD  Follow-up Information     Follow up With Details Comments 355 Bharathi Eldridge MD On 10/12/2017 Appt time 3:30 pm     NOTE: We are in [address] [address] [facility]  [address]  [address] [facility]      Andrews Bo MD In 2 weeks  [facility] [address]  [address] [facility] [address]  [address] [phone]                Total time in minutes spent coordinating this discharge (includes going over instructions, follow-up, prescriptions, and preparing report for sign off to her PCP) :  35 minutes    Signed:  Jessica Orozco MD

## 2017-10-02 NOTE — PROGRESS NOTES
Renal-out of room at time of rounds. Labs reviewed. D/W nursing.   Obtaining head ct without contrast for further eval of her shaylee (agree likely due to high cortisol levels but want to assess for any brain lesions) Edson Cardona MD

## 2017-10-02 NOTE — H&P
Radiology History and Physical    Patient: Tahira Villanueva 61 y.o. female       Chief Complaint: Leg Pain and Leg Swelling      History of Present Illness: liver lesions     History:    Past Medical History:   Diagnosis Date    Bronchitis     Diabetes ([address]     Endocrine disease     Hypertension     Other ill-defined conditions     high cholesterol     History reviewed. No pertinent family history. Social History     Social History    Marital status:      Spouse name: N/A    Number of children: N/A    Years of education: N/A     Occupational History    Not on file. Social History Main Topics    Smoking status: Current Every Day Smoker     Packs/day: 1.00    Smokeless tobacco: Never Used    Alcohol use Yes      Comment: socially    Drug use: No    Sexual activity: Yes     Other Topics Concern    Not on file     Social History Narrative       Allergies:    Allergies   Allergen Reactions    Codeine Nausea and Vomiting    Darvocet A500 [Propoxyphene N-Acetaminophen] Nausea and Vomiting       Current Medications:  Current Facility-Administered Medications   Medication Dose Route Frequency    hydrALAZINE (APRESOLINE) 20 mg/mL injection 10 mg  10 mg IntraVENous Q6H PRN    ketoconazole (NIZORAL) tablet 200 mg  200 mg Oral BID    potassium chloride SR (KLOR-CON 10) tablet 30 mEq  30 mEq Oral TID    magnesium hydroxide (MILK OF MAGNESIA) 400 mg/5 mL oral suspension 30 mL  30 mL Oral Q6H PRN    sodium chloride (NS) flush 5-10 mL  5-10 mL IntraVENous Q8H    sodium chloride (NS) flush 5-10 mL  5-10 mL IntraVENous PRN    fluticasone-vilanterol (BREO ELLIPTA) 100mcg-25mcg/puff  1 Puff Inhalation DAILY    albuterol-ipratropium (DUO-NEB) 2.5 MG-0.5 MG/3 ML  3 mL Nebulization Q4H PRN    therapeutic multivitamin (THERAGRAN) tablet 1 Tab  1 Tab Oral DAILY    magnesium oxide (MAG-OX) tablet 400 mg  400 mg Oral BID    morphine injection 1 mg  1 mg IntraVENous Q4H PRN    insulin lispro (HUMALOG) injection   SubCUTAneous AC&HS    glucose chewable tablet 16 g  4 Tab Oral PRN    dextrose (D50W) injection syrg 12.5-25 g  12.5-25 g IntraVENous PRN    glucagon (GLUCAGEN) injection 1 mg  1 mg IntraMUSCular PRN    insulin NPH (NOVOLIN N, HUMULIN N) injection 10 Units  10 Units SubCUTAneous ACB&D    valsartan (DIOVAN) tablet 320 mg  320 mg Oral DAILY    pantoprazole (PROTONIX) tablet 40 mg  40 mg Oral ACB    levothyroxine (SYNTHROID) tablet 88 mcg  88 mcg Oral ACB        Physical Exam:  Blood pressure (!) 161/102, pulse 74, temperature 97.5 °F (36.4 °C), resp. rate 16, height 5' 3\" (1.6 m), weight 83.5 kg (184 lb), SpO2 96 %. LUNG: clear to auscultation bilaterally, HEART: regular rate and rhythm, S1, S2 normal, no murmur, click, rub or gallop      Alerts:    Hospital Problems  Date Reviewed: 10/2/2017          Codes Class Noted POA    Metastatic cancer ([facility] ICD-10-CM: C79.9  ICD-9-CM: 199.1  9/29/2017 Yes        Hyponatremia ICD-10-CM: E87.1  ICD-9-CM: 276.1  9/28/2017 Yes        * (Principal)Hypokalemia ICD-10-CM: E87.6  ICD-9-CM: 276.8  9/15/2017 Yes        Diabetes mellitus ([facility] ICD-10-CM: E11.9  ICD-9-CM: 250.00  12/23/2012 Yes        Hypothyroidism ICD-10-CM: E03.9  ICD-9-CM: 244.9  12/23/2012 Yes        HTN (hypertension) ICD-10-CM: I10  ICD-9-CM: 401.9  12/23/2012 Yes        Smoker ICD-10-CM: A86.722  ICD-9-CM: 305.1  12/23/2012 Yes              Laboratory:      Recent Labs      10/02/17   0450   09/30/17   0358   HGB   --    --   12.1   HCT   --    --   33.8*   WBC   --    --   12.4*   PLT   --    --   62*   BUN  10   < >  12   CREA  0.45*   < >  0.41*   K  3.6   < >  2.4*    < > = values in this interval not displayed. Plan of Care/Planned Procedure:  Risks, benefits, and alternatives reviewed with patient and she agrees to proceed with the procedure.      Plan is for liver lesion biopsy       Sandeep Crocker MD

## 2017-10-02 NOTE — PROGRESS NOTES
Oncology Navigator  Psychosocial Assessment    Reason for Assessment:    []Depression  []Anxiety  []Caregiver [place]  []Maladaptive Coping with Serious Illness   [x]Other:  New Dx - awaiting liver biopsy results. Sources of Information:    [x]Patient  []Family  []Staff  []Medical Record    Advance Care Planning:  Advance Care Planning 9/28/2017   Patient's Healthcare Decision Maker is: Legal Next of Johanny 68   Primary Decision Maker Name Marietta Randle   Primary Decision Maker Phone Number [phone]   Primary Decision Maker Relationship to Patient -   Confirm Advance Directive None       Mental Status:    [x]Alert  []Lethargic  []Unresponsive  Oriented to:  [x]Person  [x]Place  [x]Time  [x]Situation      Barriers to Learning:    []Language  []Developmental  []Cognitive  []Altered Mental Status  []Visual/Hearing Impairment  []Unable to Read/Write  []Motivational   [x]No Barriers Identified  []Other:    Relationship Status:  []Single  [x]  []Significant Other/Life Partner  []  []  []      Living Circumstances:  []Lives Alone  [x]Family/Significant Other in Household  []Roommates  []Children in the Home  []Paid Caregivers  []Assisted Living Facility/Group Home  []Skilled [address]  []Homeless  []Incarcerated  []Environmental/Care Concerns  []Other:    Support System:    []Strong  []Fair  []Limited    Financial/Legal Concerns:    []Uninsured  []Limited Income/Resources  []Non-Citizen  [x]No Concerns Identified  []Financial POA:    []Other:    Oriental orthodox/Spiritual/Existential:  PT states she has attended [employer] off and on.     []Strong Sense of Spirituality  [x]Involved in [employer]  []Request  Visit  []Expressing Adolfo Gomez  []No Concerns Identified    Coping with Illness:         Patient: Family/Caregiver:   Understanding and Acceptance of Illness/Prognosis  [x] []   Strong Sense of Resilience [x] []   Self Reflection [] []   Engaged Support System [] []   Does not Readily Discuss Illness [] []   Denial of Terminal Status [] []   Anger [] []   Depression [] []   Anxiety/Fear [] []   Bargaining [] []   Recent Diagnosis/Prognosis [x] []   Difficulties with Body Image [] []   Loss of Identity [] []   Excessive Substance Use [] []   Mental Health History [] []   Enmeshed Relationships [] []   History of Loss [] []   Anticipatory Grief [] []   Concern for Complicated Grief [] []   Suicidal Ideation or Plan [] []   Unable to assess [] []                  Narrative: Pt and pt spouse have been  for 27 years, they met at a [address] has a 29 y.o. Son Carey Preez, who  in the last year. Pt states she is in the process of selling land, home, and they are going to move in with her Mom(Mom's home is paid for). PT works for [employer] and her spouse works for [employer] - at [employer]. Pt is leaving hospital and going to [place] - for vacation with her spouses family. Pt states it has been a rough year - her father  and her son got . Pt spouse uncle , his Sister's . PT states she has signed her DDNR and her employer will be assisting in getting her Will, adv directive etc completed. Pt states her spouse is emotional and is having a time with the whole dx - spouse doesn't think they should go to the beach. Referrals:     I.   Transportation    Medicaid ([employer]) []   [facility] []                                    Regional organization  []                                      Financial Assistance/Medication Access    Patient assistance program (Care Card) []   Co-pay assistance  []                                    Leukemia & Lymphoma Society []   [address]  []   [address] []   [facility]  []     Emotional support    Peer support group []   Local counseling []                                    Online support group [] Coordination of psychiatry consult []     Goals/Plan:  SW had pt and pt spouse FMLA to be completed once liver biopsy results are available. (In red folder to be completed).

## 2017-10-02 NOTE — PROGRESS NOTES
[address]  Social Work Navigator Encounter     Patient Name:      Medical History:     Advance Directives:    Narrative:     Barriers to Care:     Plan:

## 2017-10-02 NOTE — PROGRESS NOTES
Patient discharged to home. Ivs removed. Discharge instructions, scripts, and medication education done with patient and spouse. Education done with patient and spouse on the importance of looking out for signs and symptoms of bleeding at biopsy site. Patient has low platelets and is at an increased risk for bleeding.

## 2017-10-02 NOTE — ROUTINE PROCESS
General Surgery End of Shift Nursing Note    Bedside shift change report given to XXX (oncoming nurse) by Jay Mejia, NASRA and Sean Miller RN (offgoing nurse). Report included the following information SBAR, Kardex, Intake/Output and MAR. Shift worked:   PT is having a liver biopsy    Significant changes during shift:    ***   Non-emergent issues for physician to address:   ***     Number times ambulated in hallway past shift: {NUMBERS 0-5 [[id number]]}    Number of times OOB to chair past shift: {NUMBERS 0-5 [[id number]]}    Pain Management:  Current medication: ***  Patient states pain is manageable on current pain medication: {YES/NO:56336}    GI:    Current diet:  DIET DIABETIC CONSISTENT CARB Regular    Tolerating current diet: {YES/NO:22449}  Passing flatus: {YES/NO:34821}  Last Bowel Movement: {Time; today/yest/etc:45800}   Appearance: ***    Respiratory:    Incentive Spirometer at bedside: {YES/NO:22976}  Patient instructed on use: {YES/NO:07720}    Patient Safety:    Falls Score: {Numbers; 1-4 :21043502}  Bed Alarm On? {YES/NO:77940}  Sitter?  {YES/NO:46613}    Wagner Tavares

## 2017-10-02 NOTE — ROUTINE PROCESS
1mg Morphine given at 1818. Both IVs removed. Discharge medications reviewed with patient and spouse. Appropriate educational materials and side effects teaching were provided. Patient has scripts with them. Explained importance of risk for bleeding and signs/symptoms to look for. Advised of meds that were discontinued. Tech took pt down to main entrance for discharge.

## 2017-10-02 NOTE — PROGRESS NOTES
Called report to [employer]. Informed of liver biopsy site, patient has voided while down here, currently on room air.

## 2017-10-02 NOTE — DIABETES MGMT
DTC Progress Note    Recommendations/ Comments: Please consider adding humalog 5units ac tid. Chart reviewed on C[facility] Carreno. Patient is a 61 y.o. female with known Type 2 Diabetes on metformin ER 1000mg ac b, 500mg ac L/d and amaryl 1mg ac b at home. A1c:   Lab Results   Component Value Date/Time    Hemoglobin A1c 6.6 09/16/2017 03:14 AM    Hemoglobin A1c 6.6 12/24/2012 03:15 AM       Recent Glucose Results: Lab Results   Component Value Date/Time     (H) 10/02/2017 04:50 AM     (H) 10/01/2017 06:50 PM    GLUCPOC 157 (H) 10/02/2017 12:28 PM    GLUCPOC 250 (H) 10/01/2017 09:34 PM    GLUCPOC 268 (H) 10/01/2017 04:27 PM        Lab Results   Component Value Date/Time    Creatinine 0.45 10/02/2017 04:50 AM     Estimated Creatinine Clearance: 139.4 mL/min (based on Cr of 0.45). Active Orders   Diet    DIET DIABETIC CONSISTENT CARB Regular        PO intake: Patient Vitals for the past 72 hrs:   % Diet Eaten   10/02/17 1335 50 %   09/29/17 1845 100 %       Current hospital DM medication: NPH 10units ac b/d and humalog correction resistant scale    Will continue to follow as needed.     Thank you  SHERI Montemayor, RN, [address]

## 2017-10-02 NOTE — DISCHARGE INSTRUCTIONS
[facility].  Special Procedures/Radiology Department    Radiologist: MD Shun Plummer 2, 2017    Liver Biopsy Discharge Instructions    You may have an aching pain in the biopsy site tonight. Take Tylenol, as directed on the label, for pain or discomfort. Avoid ibuprofen (Advil, Motrin) and aspirin for the next 48 hours as these drugs may cause you to bleed. Resume your previous diet and follow the medication reconciliation form. Rest today. Do not drive or sign any legal documents activity for 24 hours because you received sedation medications. Avoid any strenuous activity for 24 hours. Do not lift anything heavier than a small grocery bag (10 pounds) and avoid twisting for the next 5 days. If you experience severe sweating, severe abdominal pain, dizziness or faintness, go to the nearest Emergency Room immediately. Pain under the left collar bone is normal.    Watch for signs of infection at biopsy site:  redness, pain, drainage, fever chills. If this occurs, call you doctor. Contact your physician after 5 business days for test results. If you have any questions or concerns, please call [phone] and ask to speak to the nurse on-call.

## 2017-10-06 PROBLEM — J96.01 ACUTE RESPIRATORY FAILURE WITH HYPOXIA (HCC): Status: ACTIVE | Noted: 2017-01-01

## 2017-10-07 PROBLEM — I48.91 ATRIAL FIBRILLATION WITH RVR (HCC): Status: ACTIVE | Noted: 2017-01-01

## 2017-10-07 PROBLEM — E87.5 HYPERKALEMIA: Status: ACTIVE | Noted: 2017-01-01

## 2017-10-07 PROBLEM — J44.1 CHRONIC OBSTRUCTIVE PULMONARY DISEASE WITH ACUTE EXACERBATION (HCC): Status: ACTIVE | Noted: 2017-01-01

## 2017-10-07 PROBLEM — C34.91 SMALL CELL CARCINOMA OF RIGHT LUNG (HCC): Status: ACTIVE | Noted: 2017-01-01

## 2017-10-07 NOTE — ED TRIAGE NOTES
Patient arrives to ED via EMS with a report of bloating and bilateral leg swelling for a few weeks. Patient had a recent hospital admission with liver biopsy's. Patient denies N/V/D; fevers.

## 2017-10-07 NOTE — PROGRESS NOTES
Hospitalist Progress Note    NAME: Bradly Espinosa   :  1960   MRN:  [id number]       Assessment / Plan:  Acute hypoxic respiratory failure POA due to possible COPD exacerbation +/-HF  --critically ill: + significant work of breathing on bipap; tachycardic, hypotensive, hypothermic   --continue BIPAP, per pt wishes on admission DNI but family is now hesitant and thinking about making her full code   --holding all BP meds  --central and pressors as needed    Afib RVR   -- stoop Cardizem gtt with hypotension --> start amiodarone gtt  --ECHO  --cardiology consult   --TSH low 2017 --> repeat with free t4     Lung cancer with metastasis  Elevated LFT's is due to mts ds    --liver biopsy: 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 Body mass index is 33.23 kg/(m^2). Code status: Partial   Surrogate Decision Maker:  [place]   Prophylaxis: Lovenox     Recommended Disposition: TBD, pt is critically ill at present with high risk for further deterioration      Subjective:     Chief Complaint / Reason for Physician Visit: following acute respiratory failure / Afib   Called to assess pt stat in icu with hypotension/afib rvr   On bipap with significant work of breathing     Discussed with RN events overnight. Review of Systems:  Symptom Y/N Comments  Symptom Y/N Comments   Fever/Chills    Chest Pain     Poor Appetite    Edema     Cough    Abdominal Pain     Sputum    Joint Pain     SOB/CAMPBELL    Pruritis/Rash     Nausea/vomit    Tolerating PT/OT     Diarrhea    Tolerating Diet     Constipation    Other       Could NOT obtain due to: AMS      Objective:     VITALS:   Last 24hrs VS reviewed since prior progress note.  Most recent are:  Patient Vitals for the past 24 hrs:   Temp Pulse Resp BP SpO2 10/07/17 0751 - (!) 129 29 105/83 (!) 89 %   10/07/17 0735 - (!) 135 29 (!) 79/59 (!) 89 %   10/07/17 0730 - (!) 142 28 (!) 74/52 (!) 89 %   10/07/17 0700 - (!) 153 29 115/75 92 %   10/07/17 0630 - - - 152/67 -   10/07/17 0618 - (!) 146 26 108/49 91 %   10/07/17 0600 (!) 94.4 °F (34.7 °C) (!) 148 30 (!) 157/103 (!) 87 %   10/07/17 0532 - (!) 129 - 96/53 -   10/07/17 0513 - (!) 148 - 121/72 (!) 86 %   10/07/17 0511 - (!) 169 - 121/72 (!) 86 %   10/07/17 0455 - (!) 173 - 137/74 (!) 87 %   10/07/17 0450 - (!) 163 - 114/68 91 %   10/07/17 0445 - (!) 164 - 109/74 90 %   10/07/17 0440 - (!) 162 - 93/65 98 %   10/07/17 0430 - (!) 164 - 96/69 90 %   10/07/17 0426 - (!) 163 - 96/75 93 %   10/07/17 0420 - (!) 170 - 102/61 (!) 89 %   10/07/17 0416 - (!) 175 - 114/45 92 %   10/07/17 0410 - (!) 171 - 103/75 93 %   10/07/17 0405 - (!) 171 - 115/78 92 %   10/07/17 0400 - (!) 172 - 119/75 92 %   10/07/17 0355 - (!) 166 - 117/72 92 %   10/07/17 0351 - (!) 169 - (!) 135/115 (!) 87 %   10/07/17 0345 - (!) 166 - 126/73 (!) 87 %   10/07/17 0340 - (!) 168 - 118/68 91 %   10/07/17 0335 - (!) 190 - 121/74 93 %   10/07/17 0332 - (!) 192 - (!) 129/93 -   10/07/17 0330 97.6 °F (36.4 °C) (!) 195 26 (!) 129/93 94 %   10/07/17 0328 - (!) 190 - - (!) 89 %   10/07/17 0253 - (!) 125 - (!) 141/100 (!) 87 %   10/07/17 0251 97.9 °F (36.6 °C) (!) 108 22 (!) 141/100 90 %   10/07/17 0145 98 °F (36.7 °C) (!) 104 15 122/59 90 %   10/07/17 0130 - (!) 102 17 127/68 90 %   10/07/17 0115 - (!) 106 16 132/71 90 %   10/07/17 0104 - (!) 104 17 - 90 %   10/07/17 0100 - (!) 112 21 127/72 -   10/07/17 0045 - (!) 121 22 143/81 91 %   10/07/17 0030 - (!) 111 15 142/75 90 %   10/07/17 0015 - (!) 107 15 127/79 90 %   10/07/17 0001 - (!) 121 16 139/81 (!) 89 %   10/06/17 2345 - (!) 116 18 161/78 90 %   10/06/17 2318 - (!) 130 20 - 90 %   10/06/17 2317 - - - (!) 160/95 -   10/06/17 2215 - (!) 115 18 (!) 171/97 93 %   10/06/17 2200 - (!) 114 19 (!) 170/92 (!) 87 % 10/06/17 2145 - (!) 111 15 160/82 -   10/06/17 2130 - (!) 104 17 152/87 91 %   10/06/17 2115 - 97 18 152/86 92 %   10/06/17 2100 - (!) 101 17 153/86 92 %   10/06/17 2030 - - - 146/86 93 %   10/06/17 2020 97.7 °F (36.5 °C) (!) 101 20 145/88 (!) 89 %   10/06/17 2019 - - - - (!) 89 %   10/06/17 2018 - - - 145/88 -       Intake/Output Summary (Last 24 hours) at 10/07/17 0807  Last data filed at 10/07/17 0700   Gross per 24 hour   Intake           142.42 ml   Output             2040 ml   Net         -1897.58 ml        PHYSICAL EXAM:  General: WD, WN. Lethargic, cooperative, moderate respiratory acute distress  on BIPAP  EENT:  EOMI. Anicteric sclerae. MMM  Resp:  CTA bilaterally, no wheezing or rales. No accessory muscle use  CV:  Irregularly irregular  rhythm,  ++ edema  GI:  Soft, Non distended, Non tender.  +Bowel sounds  Neurologic:  lethargic    Psych:   Poor insight. Not anxious nor agitated  Skin:  No rashes. No jaundice    Reviewed most current lab test results and cultures  YES  Reviewed most current radiology test results   YES  Review and summation of old records today    NO  Reviewed patient's current orders and MAR    YES  PMH/ reviewed - no change compared to H&P  ________________________________________________________________________  Care Plan discussed with:    Comments   Patient y    Family  y  and niece at bedside    RN y    Care Manager     Consultant  y intensivist                      Multidiciplinary team rounds were held today with , nursing, pharmacist and clinical coordinator. Patient's plan of care was discussed; medications were reviewed and discharge planning was addressed. ________________________________________________________________________  Total NON critical care TIME:    Minutes    Total CRITICAL CARE TIME Spent:  65  Minutes non procedure based  Patient is critically ill and at high risk for further deterioration.   Complex decision making was performed which includes reviewing the patient's past medical records, current laboratory results, and actual Xray films. I have also discussed this case with the involved ED physician and with Pulmonary Critical care. Critical Care:  The reason for providing this level of medical care for this critically ill patient was due a critical illness that impaired one or more vital organ systems such that there was a high probability of imminent or life threatening deterioration in the patients condition. This care involved high complexity decision making to assess, manipulate, and support vital system functions, to treat this degreee vital organ system failure and to prevent further life threatening deterioration of the patients condition.    I was immediately available to the patient. Comments   >50% of visit spent in counseling and coordination of care     ________________________________________________________________________  Shyam Bowie MD     Procedures: see electronic medical records for all procedures/Xrays and details which were not copied into this note but were reviewed prior to creation of Plan. LABS:  I reviewed today's most current labs and imaging studies.   Pertinent labs include:  Recent Labs      10/07/17   0531  10/06/17   2105   WBC  5.9  10.3   HGB  11.2*  10.8*   HCT  32.3*  31.3*   PLT  31*  26*     Recent Labs      10/07/17   0531  10/06/17   2105   NA  138  133*   K  4.3  5.4*   CL  101  99   CO2  23  28   GLU  339*  342*   BUN  20  18   CREA  0.96  0.88   CA  8.9  8.8   MG  2.3  2.4   PHOS  4.9*   --    ALB   --   2.4*   TBILI   --   3.2*   SGOT   --   195*   ALT   --   292*   INR  1.2*   --        Signed: Shyam Bowie MD

## 2017-10-07 NOTE — PROGRESS NOTES
[facility]  Pulmonary, Critical Care, and Sleep Medicine    Name: Melissa Pastrana MRN: [id number]   : 1960 Hospital: [facility]   Date: 10/7/2017        Critical Care Initial Patient Consult    IMPRESSION:   · Acute Hypoxic Respiratory Failure due acute Pulmonary edema, metastatic Small Cell Ca. · Metastatic Small Cell Lung Ca (poorly differentiated neuroendocrine carcinoma)  with mets to   · Possible left basilar pneumonia  · Atrial Fib with RVR  · Anasarca with peripheral edema  · Hypotension, Shock  · Thrombocytopenia  · DM  · Smoker  · Pt is Do Not Intubate  · Overall poor prognosis  · Critically ill, 35 min CC, EOP. RECOMMENDATIONS:   · BIPAP support  · Will add cefepime for abx  · Glycemic Control and monitoring  · Will respect her wishes for DNI  · Pressors as needed to keep MAP over 65. Cc: Abdominal bloating, and swelling for 2 days prior to admission. Subjective/History: This patient has been seen and evaluated at the request of Dr. Rey Bird for above. Patient is a 61 y.o. female   Who presents for above. She has Atrial fib with RVR. Has hypotension this am.   The patient is critically ill and can not provide additional history due to Unable to speak. Past Medical History:   Diagnosis Date    Bronchitis     Diabetes ([facility] [address]     Endocrine disease     Hypertension     Other ill-defined conditions(799.89)     high cholesterol      Past Surgical History:   Procedure Laterality Date    HX HEENT      tonsil    HX HEENT      eye ball decompression    HX ORTHOPAEDIC      carpal tunnel      Prior to Admission medications    Medication Sig Start Date End Date Taking? Authorizing Provider   magnesium oxide (MAG-OX) 400 mg tablet Take 1 Tab by mouth two (2) times a day for 60 days. 10/2/17 12/1/17  Arley Goodell, MD   potassium chloride SR (KLOR-CON 10) 10 mEq tablet Take 3 Tabs by mouth three (3) times daily for 60 days.  10/2/17 12/1/17  Kiet Gabriel MD   ketoconazole (NIZORAL) 200 mg tablet Take 1 Tab by mouth two (2) times a day for 60 days. 10/2/17 12/1/17  Kiet Gabriel MD   oxyCODONE IR (ROXICODONE) 5 mg immediate release tablet Take 1 Tab by mouth every four (4) hours as needed for Pain. Max Daily Amount: 30 mg. 10/2/17   Kiet Gabriel MD   cholecalciferol (VITAMIN D3) 1,000 unit tablet Take 1,000 Units by mouth daily. Historical Provider   fish oil-omega-3 fatty acids 340-1,000 mg capsule Take 1 Cap by mouth daily. Historical Provider   valsartan (DIOVAN) 320 mg tablet Take 320 mg by mouth daily. Historical Provider   lansoprazole (PREVACID) 15 mg capsule Take 30 mg by mouth Daily (before breakfast). Historical Provider   albuterol (VENTOLIN HFA) 90 mcg/actuation inhaler Take 2 Puffs by inhalation every four (4) hours as needed for Wheezing or Shortness of Breath. Historical Provider   amLODIPine (NORVASC) 5 mg tablet Take 5 mg by mouth daily. Historical Provider   MULTIVIT-MINERALS/FOLIC ACID (WOMEN'S MULTIVITAMIN GUMMIES PO) Take 2 Caps by mouth daily. Historical Provider   levothyroxine (SYNTHROID) 88 mcg tablet Take 88 mcg by mouth Daily (before breakfast). Historical Provider   glimepiride (AMARYL) 1 mg tablet Take 1 Tab by mouth Daily (before breakfast). 9/18/17   Pierre Prince MD   fluticasone-vilanterol (BREO ELLIPTA) 100-25 mcg/dose inhaler Take 1 Puff by inhalation daily. 9/18/17   Pierre Prince MD   albuterol-ipratropium (DUO-NEB) 2.5 mg-0.5 mg/3 ml nebu 3 mL by Nebulization route every four (4) hours as needed.  9/18/17   Roxane Christianson MD     Current Facility-Administered Medications   Medication Dose Route Frequency    fluticasone-vilanterol (BREO ELLIPTA) 100mcg-25mcg/puff  1 Puff Inhalation DAILY    sodium chloride (NS) flush 5-10 mL  5-10 mL IntraVENous Q8H    methylPREDNISolone (PF) (SOLU-MEDROL) injection 40 mg  40 mg IntraVENous Q12H    levalbuterol (XOPENEX) nebulizer soln 1.25 mg/3 mL 1.25 mg Nebulization Q6H RT    metoprolol (LOPRESSOR) 5 mg/5 mL injection        insulin lispro (HUMALOG) injection   SubCUTAneous Q6H    amiodarone (CORDARONE) 900 mg/250 ml D5W infusion  0.5-1 mg/min IntraVENous TITRATE    dexmedeTOMidine (PRECEDEX) 400 mcg in 0.9% sodium chloride 100 mL infusion  0.2-1.4 mcg/kg/hr IntraVENous TITRATE    insulin NPH (NOVOLIN N, HUMULIN N) injection 5 Units  5 Units SubCUTAneous Q12H    NOREPINephrine (LEVOPHED) 8,000 mcg in dextrose 5% 250 mL infusion  2-30 mcg/min IntraVENous TITRATE     Allergies   Allergen Reactions    Codeine Nausea and Vomiting    Darvocet A500 [Propoxyphene N-Acetaminophen] Nausea and Vomiting      Social History   Substance Use Topics    Smoking status: Current Every Day Smoker     Packs/day: 1.00     Years: 20.00    Smokeless tobacco: Never Used      Comment: patient states that she quit smoking a couple months ago and now vapes    Alcohol use Yes      Comment: socially      History reviewed. No pertinent family history. Review of Systems:  Review of systems not obtained due to patient factors.     Objective:   Vital Signs:    Visit Vitals    /83    Pulse (!) 129    Temp (!) 94.4 °F (34.7 °C)    Resp 29    Ht 5' 3\" (1.6 m)    Wt 85.1 kg (187 lb 9.8 oz)    SpO2 (!) 89%    BMI 33.23 kg/m2       O2 Device: BIPAP   O2 Flow Rate (L/min): 3 l/min   Temp (24hrs), Av °F (36.1 °C), Min:94.4 °F (34.7 °C), Max:98 °F (36.7 °C)       Intake/Output:   Last shift:         Last 3 shifts: 10/05 1901 - 10/07 0700  In: 142.4 [I.V.:142.4]  Out: 0 [Urine:2040]    Intake/Output Summary (Last 24 hours) at 10/07/17 0949  Last data filed at 10/07/17 07   Gross per 24 hour   Intake           142.42 ml   Output             2040 ml   Net         -1897.58 ml     Hemodynamics:   PAP:   CO:     Wedge:   CI:     CVP:    SVR:       PVR:       Ventilator Settings:  Mode Rate Tidal Volume Pressure FiO2 PEEP            100 %       Peak airway pressure: Minute ventilation: 16.5 l/min      Physical Exam:    General:  sleepy, no distress, appears stated age. Head:  Normocephalic, without obvious abnormality, atraumatic. Eyes:  Conjunctivae/corneas clear. PERRL, EOMs intact. Nose: Nares normal. Septum midline. Mucosa normal. No drainage or sinus tenderness. Throat: Lips, mucosa, and tongue normal. Teeth and gums normal.   Neck: Supple, symmetrical, trachea midline, no adenopathy, thyroid: no enlargment/tenderness/nodules, no carotid bruit and no JVD. Back:   Symmetric, no curvature. ROM normal.   Lungs:   Bilaterally has Wheezing and  Some rhonchi. Has good air movement. Chest wall:  No tenderness or deformity. Heart:  Regular rate and rhythm, S1, S2 normal, no murmur, click, rub or gallop. Abdomen:   Soft, non-tender. Bowel sounds normal. No masses,  No organomegaly. Extremities: Extremities normal, atraumatic, no cyanosis, has edema bilateral lower extremities. Pulses: 2+ and symmetric all extremities. Skin: Skin color, texture, turgor normal. No rashes or lesions   Lymph nodes: Cervical, supraclavicular, and axillary nodes normal.   Neurologic: Grossly nonfocal       Data:     Recent Results (from the past 24 hour(s))   CBC WITH AUTOMATED DIFF    Collection Time: 10/06/17  9:05 PM   Result Value Ref Range    WBC 10.3 3.6 - 11.0 K/uL    RBC 3.69 (L) 3.80 - 5.20 M/uL    HGB 10.8 (L) 11.5 - 16.0 g/dL    HCT 31.3 (L) 35.0 - 47.0 %    MCV 84.8 80.0 - 99.0 FL    MCH 29.3 26.0 - 34.0 PG    MCHC 34.5 30.0 - 36.5 g/dL    RDW 14.6 (H) 11.5 - 14.5 %    PLATELET 26 (LL) 430 - 400 K/uL    NEUTROPHILS 48 %    BAND NEUTROPHILS 19 %    LYMPHOCYTES 24 %    MONOCYTES 4 %    EOSINOPHILS 0 %    BASOPHILS 0 %    METAMYELOCYTES 3 %    MYELOCYTES 2 %    ABS. NEUTROPHILS 6.9 K/UL    ABS. LYMPHOCYTES 2.5 K/UL    ABS. MONOCYTES 0.4 K/UL    ABS. EOSINOPHILS 0.0 K/UL    ABS.  BASOPHILS 0.0 K/UL    RBC COMMENTS NORMOCYTIC, NORMOCHROMIC      WBC COMMENTS TOXIC GRANULATION     MAGNESIUM    Collection Time: 10/06/17  9:05 PM   Result Value Ref Range    Magnesium 2.4 1.6 - 2.4 mg/dL   METABOLIC PANEL, COMPREHENSIVE    Collection Time: 10/06/17  9:05 PM   Result Value Ref Range    Sodium 133 (L) 136 - 145 mmol/L    Potassium 5.4 (H) 3.5 - 5.1 mmol/L    Chloride 99 97 - 108 mmol/L    CO2 28 21 - 32 mmol/L    Anion gap 6 5 - 15 mmol/L    Glucose 342 (H) 65 - 100 mg/dL    BUN 18 6 - 20 MG/DL    Creatinine 0.88 0.55 - 1.02 MG/DL    BUN/Creatinine ratio 20 12 - 20      GFR est AA >60 >60 ml/min/1.73m2    GFR est non-AA >60 >60 ml/min/1.73m2    Calcium 8.8 8.5 - 10.1 MG/DL    Bilirubin, total 3.2 (H) 0.2 - 1.0 MG/DL    ALT (SGPT) 292 (H) 12 - 78 U/L    AST (SGOT) 195 (H) 15 - 37 U/L    Alk. phosphatase 437 (H) 45 - 117 U/L    Protein, total 5.9 (L) 6.4 - 8.2 g/dL    Albumin 2.4 (L) 3.5 - 5.0 g/dL    Globulin 3.5 2.0 - 4.0 g/dL    A-G Ratio 0.7 (L) 1.1 - 2.2     NT-PRO BNP    Collection Time: 10/06/17  9:05 PM   Result Value Ref Range    NT pro-BNP 1014 (H) 0 - 125 PG/ML   NUCLEATED RBC    Collection Time: 10/06/17  9:05 PM   Result Value Ref Range    NRBC 5.5 (H) 0  WBC    ABSOLUTE NRBC 0.57 (H) 0.00 - 0.01 K/uL    WBC CORRECTED FOR NR ADJUSTED FOR NUCLEATED RBC'S     LIPASE    Collection Time: 10/06/17  9:05 PM   Result Value Ref Range    Lipase 778 (H) 73 - 393 U/L   EKG, 12 LEAD, SUBSEQUENT    Collection Time: 10/07/17  3:33 AM   Result Value Ref Range    Ventricular Rate 184 BPM    Atrial Rate 163 BPM    QRS Duration 80 ms    Q-T Interval 266 ms    QTC Calculation (Bezet) 465 ms    Calculated R Axis 5 degrees    Calculated T Axis -131 degrees    Diagnosis       Atrial fibrillation with rapid ventricular response  Marked ST abnormality, possible inferior subendocardial injury  Marked ST abnormality, possible anterior subendocardial injury  When compared with ECG of 15-SEP-2017 18:12,  Atrial fibrillation has replaced Sinus rhythm  Vent.  rate has increased BY  96 BPM  ST more depressed in Anterior leads  T wave inversion now evident in Inferior leads  T wave inversion now evident in Lateral leads     CBC W/O DIFF    Collection Time: 10/07/17  5:31 AM   Result Value Ref Range    WBC 5.9 3.6 - 11.0 K/uL    RBC 3.78 (L) 3.80 - 5.20 M/uL    HGB 11.2 (L) 11.5 - 16.0 g/dL    HCT 32.3 (L) 35.0 - 47.0 %    MCV 85.4 80.0 - 99.0 FL    MCH 29.6 26.0 - 34.0 PG    MCHC 34.7 30.0 - 36.5 g/dL    RDW 14.6 (H) 11.5 - 14.5 %    PLATELET 31 (LL) 580 - 400 K/uL   MAGNESIUM    Collection Time: 10/07/17  5:31 AM   Result Value Ref Range    Magnesium 2.3 1.6 - 2.4 mg/dL   PHOSPHORUS    Collection Time: 10/07/17  5:31 AM   Result Value Ref Range    Phosphorus 4.9 (H) 2.6 - 4.7 MG/DL   CK W/ CKMB & INDEX    Collection Time: 10/07/17  5:31 AM   Result Value Ref Range     (H) 26 - 192 U/L    CK - MB 2.2 <3.6 NG/ML    CK-MB Index 0.7 0 - 2.5     TROPONIN I    Collection Time: 10/07/17  5:31 AM   Result Value Ref Range    Troponin-I, Qt. <0.04 <0.05 ng/mL   PROTHROMBIN TIME + INR    Collection Time: 10/07/17  5:31 AM   Result Value Ref Range    INR 1.2 (H) 0.9 - 1.1      Prothrombin time 12.1 (H) 9.0 - 11.1 sec   BLOOD GAS, ARTERIAL    Collection Time: 10/07/17  5:31 AM   Result Value Ref Range    pH 7.53 (H) 7.35 - 7.45      PCO2 23 (L) 35.0 - 45.0 mmHg    PO2 48 (LL) 80 - 100 mmHg    O2 SAT 89 (L) 92 - 97 %    BICARBONATE 19 (L) 22 - 26 mmol/L    BASE DEFICIT 2.0 mmol/L    O2 METHOD NRM      FIO2 100 %    SPONTANEOUS RATE 28.0      Sample source ARTERIAL      SITE RIGHT RADIAL      RANDY'S TEST YES      Critical value read back beatrice kohler rn    METABOLIC PANEL, BASIC    Collection Time: 10/07/17  5:31 AM   Result Value Ref Range    Sodium 138 136 - 145 mmol/L    Potassium 4.3 3.5 - 5.1 mmol/L    Chloride 101 97 - 108 mmol/L    CO2 23 21 - 32 mmol/L    Anion gap 14 5 - 15 mmol/L    Glucose 339 (H) 65 - 100 mg/dL    BUN 20 6 - 20 MG/DL    Creatinine 0.96 0.55 - 1.02 MG/DL    BUN/Creatinine ratio 21 (H) 12 - 20      GFR est AA >60 >60 ml/min/1.73m2    GFR est non-AA 60 (L) >60 ml/min/1.73m2    Calcium 8.9 8.5 - 10.1 MG/DL   NUCLEATED RBC    Collection Time: 10/07/17  5:31 AM   Result Value Ref Range    NRBC 9.9 (H) 0  WBC    ABSOLUTE NRBC 0.58 (H) 0.00 - 0.01 K/uL    WBC CORRECTED FOR NR ADJUSTED FOR NUCLEATED RBC'S     HEPATIC FUNCTION PANEL    Collection Time: 10/07/17  5:31 AM   Result Value Ref Range    Protein, total 5.5 (L) 6.4 - 8.2 g/dL    Albumin 2.1 (L) 3.5 - 5.0 g/dL    Globulin 3.4 2.0 - 4.0 g/dL    A-G Ratio 0.6 (L) 1.1 - 2.2      Bilirubin, total 3.5 (H) 0.2 - 1.0 MG/DL    Bilirubin, direct 2.4 (H) 0.0 - 0.2 MG/DL    Alk. phosphatase 424 (H) 45 - 117 U/L    AST (SGOT) 182 (H) 15 - 37 U/L    ALT (SGPT) 278 (H) 12 - 78 U/L             Telemetry:Atrial Fib with RVR    Imaging:  I have personally reviewed the patients radiographs and have reviewed the reports:  10-7-17: IMPRESSION: Left basilar airspace disease may represent atelectasis or  pneumonia; follow-up is recommended to assure resolution. CT of abdomen: IMPRESSION:     1. Multiple hypodense lesions throughout the liver most likely representing  metastases. 2. 1.8 cm indeterminate left adrenal nodule, worrisome for metastasis given the  above. 3. Hypodense pancreas that appears somewhat indistinct with slight adjacent  stranding likely related to acute pancreatitis. 4. Significantly enlarged gastrohepatic lymph node. 5. Small area of consolidation in the medial left lung base.        Total critical care time exclusive of procedures: 35 minutes  Yesenia Arreguin MD

## 2017-10-07 NOTE — PROGRESS NOTES
Rapid Response 2 note    Responded to RR 2 called in for respiratory distress requiring NRB mask     Subjective:  Pt alert but in moderate/severe resp distress  Confirmed DNI status again with patient & she clearly states NO INTUBATION/VENT SUPPORT as discussed in ER on admission    Objective:  Exam:  Resp: tachypnea noted, on NRB mask, Distant BS noted, ? Crackles at bases  Cardio: Irregular rhythm noted  Extremities: Edema persistent    Vitals: HR in 150's/min, sats 92% on BIPAP,     Labs:  ABG showed 7.525/23/47/18.6  Imaging:   Admission CXR noted - unchanged  Recent CT Chest noted- Extensive mediastinal and hilar adenopathy and multiple central left lower lobe  pulmonary nodules, concerning for left lower lobe primary malignancy with  metastatic disease. Previously described multiple hepatic lesions and left adrenal mass. Incidental coronary artery disease  Small left pleural transudate  Minimal pericardial effusion, too small to further characterize    Assessment & Plan:    1> Acute respiratory Failure with Hypoxia  Likely due to Flash Pulmonary Edema due to New onset AFib RVR  Respiratory Alkalosis - due to hyperventilation    Transfer to ICU  Spoke with On call Pulmonary Dr Concha Nichols  Will start on BIPAP at setting 8/6  IV Amiodarone bolus x 1  Cont IV Cardizem started on earlier RR for Afib RVR  Give Lasix 60 mg x 1 now- check stat CXR to rule out Pulm edema  DNI as per pt's wishes- no intubation  Cont Solumedrol, nebs (xopenex) for now    2> Metastatic Cancer POA- likely Lung primary with mets to Liver, Adrenals    S/p Liver biopsy last admission  Poor prognosis overall  May need to consider Palliative care/hospice transition if pt continues to deteriorates despite aggressive care in ICU.           Direct Critical care time spent: 50 mins

## 2017-10-07 NOTE — PROGRESS NOTES
9904- RRT called  0326 arrived to room 2261 , pt found connected to monitor appearing to be in A fib  With RVR. recently placed on 3 lnc for O2 sats less than 90%. currently 89% on 4lpm. Pt placed on 100%NRB by RT . Lungs sound coarse and diminished throughout. EKG done, Pt alert and oriented CAMPBELL but cooperative. 0202 transmitted EKG. 0340- Pt changed to 40 % NRB and not tolerating this . Back to Aldo Ansari, RN  Spoke with Dr Ghazal Hurst and orders rec'd to start Cardizem Gtt. Awaiting gtt. 0345- Pt voided using Bedpan and is feeling better now appears less stressed. Pt continues to be Alert and Ox3,  0350- VS stable, /73. Pulse -166. RR-22 . No further action needed. Pt to stay in present room. Encouraged Glenroy Lerner RN to call if further assistance needed. RRT completed at 0350.

## 2017-10-07 NOTE — ED NOTES
Patient returned from ultrasound at this time; patient ambulated to bedside commode; patient voided at this time. Patient returned to bed, on monitor x3, call bell within reach. Patient in a position of comfort.

## 2017-10-07 NOTE — PROGRESS NOTES
9404- rapid response called. 4094 arrived into pt room. labs drawn and sent, CXR done, Call already placed to Dr Ferdinand Hagen. Pt is DNI, using accessory muscles to breath, distressed. Rapid Afib.  [id number] transferring pt to CCU 6550 on cardizem gtt and Bipap.

## 2017-10-07 NOTE — ED NOTES
TRANSFER - OUT REPORT:    Verbal report given to Verenice Vera RN (name) on Austen Squires  being transferred to PCU (unit) for routine progression of care       Report consisted of patients Situation, Background, Assessment and   Recommendations(SBAR). Information from the following report(s) SBAR, Kardex, ED Summary, Intake/Output, MAR, Accordion and Recent Results was reviewed with the receiving nurse. Lines:   Peripheral IV 10/06/17 Left Antecubital (Active)   Site Assessment Clean, dry, & intact 10/6/2017  9:16 PM   Phlebitis Assessment 0 10/6/2017  9:16 PM   Infiltration Assessment 0 10/6/2017  9:16 PM   Dressing Status Clean, dry, & intact 10/6/2017  9:16 PM   Dressing Type Tape;Transparent 10/6/2017  9:16 PM   Hub Color/Line Status Pink;Patent; Flushed 10/6/2017  9:16 PM   Action Taken Blood drawn 10/6/2017  9:16 PM        Opportunity for questions and clarification was provided.       Patient transported with:   Monitor  Registered Nurse

## 2017-10-07 NOTE — H&P
Hospitalist Admission Note    NAME: Cj Carreno   :  1960   MRN:  [id number]     Date/Time:  10/7/2017 12:45 AM    Patient PCP: Snehal Nguyen MD  ________________________________________________________________________    My assessment of this patient's clinical condition and my plan of care is as follows. Assessment / Plan:  Acute resp failure with Hypoxia POA  Likely due to Acute COPD exacerbation POA  Anasarca with LE Edema POA  H/o recently diagnosed Metastatic Cancer - likely Lung primary- mets to liver, adrenals- s/p Liver biopsy last admission  Thrombocytopenia POA  Smoker  CXR= Left lung base opacities and mediastinal lymphadenopathy. No change since last  Month    Admit to stepdown unit for close monitoring  Oxygen to keep sats >90%  IV lasix BID , responded well to diuretic x 1 in ER  IV steroids  Scheduled nebs  Mucinex  IP Oncology consult Dr Yadira Butler for follow up on biopsy & further plans- pt was suppose to F/U next week as OP after recent discharge  Cont Home inhalers    Hyperkalemia  Hold PO k+  Cont PO mag  IV lasix BID will help    HTN  Sinus tachycardia    Cont norvascm, Diovan    DM type 2 uncontrolled    FS Q AC & HS  Cont Amaryl  SSI lispro here  Watch for worsening BS with IV steroids- can add NPH with steroids as needed    Chronic Constipation - last BM yesterday  Cont aggressive Bowel regimen      Code Status: DNI as per pt, ok with cardiac resuscitation & blood transfusion  Surrogate Decision Maker:  Keny     DVT Prophylaxis: low PLTs  GI Prophylaxis: not indicated    Baseline: Pt was recently DC from hospital on request for 5 day trip to [place], s/p liver biopsy for unknown metastatic cancer diagnosed last admission        Subjective:   CHIEF COMPLAINT: Abdominal Bloating & swelling x 2 days    HISTORY OF PRESENT ILLNESS:     Cj Carreno is a 61 y.o.  female who presents with above complains from home via EMS.   Pt presents with CC of worsening Abdominal bloating/swelling associated with LE edema x 2 days  H/o recently diagnosed Metastatic Cancer with lesions in Liver, Adrenals & Lungs- thought to be primary lung Ca. S/p Liver biopsy on 10/2 after which she was DC on request for pt to travel to [facility] for planned vacation. 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 We were asked to admit for work up and evaluation of the above problems. Past Medical History:   Diagnosis Date    Bronchitis     Diabetes ([address]     Endocrine disease     Hypertension     Other ill-defined conditions(799.89)     high cholesterol        Past Surgical History:   Procedure Laterality Date    HX HEENT      tonsil    HX HEENT      eye ball decompression    HX ORTHOPAEDIC      carpal tunnel       Social History   Substance Use Topics    Smoking status: Current Every Day Smoker     Packs/day: 1.00     Years: 20.00    Smokeless tobacco: Never Used      Comment: patient states that she quit smoking a couple months ago and now vapes    Alcohol use Yes      Comment: socially      Family History  +ve for HTN  No h/o cancers    Allergies   Allergen Reactions    Codeine Nausea and Vomiting    Darvocet A500 [Propoxyphene N-Acetaminophen] Nausea and Vomiting        Prior to Admission medications    Medication Sig Start Date End Date Taking? Authorizing Provider   magnesium oxide (MAG-OX) 400 mg tablet Take 1 Tab by mouth two (2) times a day for 60 days. 10/2/17 12/1/17  Jam Us MD   potassium chloride SR (KLOR-CON 10) 10 mEq tablet Take 3 Tabs by mouth three (3) times daily for 60 days.  10/2/17 12/1/17  Jam Us MD   ketoconazole (NIZORAL) 200 mg tablet Take 1 Tab by mouth two (2) times a day for 60 days. 10/2/17 12/1/17  Arlette Rushing MD   oxyCODONE IR (ROXICODONE) 5 mg immediate release tablet Take 1 Tab by mouth every four (4) hours as needed for Pain. Max Daily Amount: 30 mg. 10/2/17   Arlette Rushing MD   cholecalciferol (VITAMIN D3) 1,000 unit tablet Take 1,000 Units by mouth daily. Historical Provider   fish oil-omega-3 fatty acids 340-1,000 mg capsule Take 1 Cap by mouth daily. Historical Provider   valsartan (DIOVAN) 320 mg tablet Take 320 mg by mouth daily. Historical Provider   lansoprazole (PREVACID) 15 mg capsule Take 30 mg by mouth Daily (before breakfast). Historical Provider   albuterol (VENTOLIN HFA) 90 mcg/actuation inhaler Take 2 Puffs by inhalation every four (4) hours as needed for Wheezing or Shortness of Breath. Historical Provider   amLODIPine (NORVASC) 5 mg tablet Take 5 mg by mouth daily. Historical Provider   MULTIVIT-MINERALS/FOLIC ACID (WOMEN'S MULTIVITAMIN GUMMIES PO) Take 2 Caps by mouth daily. Historical Provider   levothyroxine (SYNTHROID) 88 mcg tablet Take 88 mcg by mouth Daily (before breakfast). Historical Provider   glimepiride (AMARYL) 1 mg tablet Take 1 Tab by mouth Daily (before breakfast). 9/18/17   Pierre Prince MD   fluticasone-vilanterol (BREO ELLIPTA) 100-25 mcg/dose inhaler Take 1 Puff by inhalation daily. 9/18/17   Pierre Prince MD   albuterol-ipratropium (DUO-NEB) 2.5 mg-0.5 mg/3 ml nebu 3 mL by Nebulization route every four (4) hours as needed.  9/18/17   Cricket Min MD       REVIEW OF SYSTEMS:       Total of 12 systems reviewed as follows:       POSITIVE= underlined text  Negative = text not underlined  General:  fever, chills, sweats, generalized weakness, weight loss/gain,      loss of appetite   Eyes:    blurred vision, eye pain, loss of vision, double vision  ENT:    rhinorrhea, pharyngitis   Respiratory:   cough, sputum production, SOB, CAMPBELL, wheezing, pleuritic pain   Cardiology:   chest pain, palpitations, orthopnea, PND, edema, syncope   Gastrointestinal:  abdominal bloating with distension , N/V, diarrhea, dysphagia, constipation, bleeding   Genitourinary:  frequency, urgency, dysuria, hematuria, incontinence   Muskuloskeletal :  arthralgia, myalgia, back pain  Hematology:  easy bruising, nose or gum bleeding, lymphadenopathy   Dermatological: rash, ulceration, pruritis, color change / jaundice  Endocrine:   hot flashes or polydipsia   Neurological:  headache, dizziness, confusion, focal weakness, paresthesia,     Speech difficulties, memory loss, gait difficulty  Psychological: Feelings of anxiety, depression, agitation    Objective:   VITALS:    Visit Vitals    /75    Pulse (!) 111    Temp 97.7 °F (36.5 °C)    Resp 15    SpO2 90%       PHYSICAL EXAM:    General:    Alert, cooperative, no distress, appears stated age. HEENT: Atraumatic, icteric sclerae noted+, pink conjunctivae     No oral ulcers, mucosa moist, throat clear, dentition fair  Neck:  Supple, symmetrical,  thyroid: non tender  Lungs:   Expiratory Wheezing noted bilaterally + No rales. Chest wall:  No tenderness  No Accessory muscle use. Heart:   Regular  rhythm,  No  murmur  2 + LE pitting edema noted +  Abdomen:   Soft, non-tender. diffusely distended. Bowel sounds normal  Extremities: No cyanosis. No clubbing,      Skin turgor normal, Capillary refill normal, Radial dial pulse 2+  Skin:     Not pale. Not Jaundiced  No rashes, Anasarca/abdominal wall edema noted +  Psych:  Good insight. Not depressed. Not anxious or agitated. Neurologic: EOMs intact. No facial asymmetry. No aphasia or slurred speech. Symmetrical strength, Sensation grossly intact.  Alert and oriented X 4.     _______________________________________________________________________  Care Plan discussed with:    Comments   Patient x    Family      RN x    Care Manager                    Consultant: _______________________________________________________________________  Expected  Disposition:   Home with Family x   HH/PT/OT/RN ?   SNF/LTC    [facility]    ________________________________________________________________________  TOTAL TIME:  58 Minutes    Critical Care Provided     Minutes non procedure based      Comments    x Reviewed previous records   >50% of visit spent in counseling and coordination of care x Discussion with patient and questions answered       ________________________________________________________________________  Signed: Vidal Zamora MD    Procedures: see electronic medical records for all procedures/Xrays and details which were not copied into this note but were reviewed prior to creation of Plan. LAB DATA REVIEWED:    Recent Results (from the past 24 hour(s))   CBC WITH AUTOMATED DIFF    Collection Time: 10/06/17  9:05 PM   Result Value Ref Range    WBC 10.3 3.6 - 11.0 K/uL    RBC 3.69 (L) 3.80 - 5.20 M/uL    HGB 10.8 (L) 11.5 - 16.0 g/dL    HCT 31.3 (L) 35.0 - 47.0 %    MCV 84.8 80.0 - 99.0 FL    MCH 29.3 26.0 - 34.0 PG    MCHC 34.5 30.0 - 36.5 g/dL    RDW 14.6 (H) 11.5 - 14.5 %    PLATELET 26 (LL) 393 - 400 K/uL    NEUTROPHILS 48 %    BAND NEUTROPHILS 19 %    LYMPHOCYTES 24 %    MONOCYTES 4 %    EOSINOPHILS 0 %    BASOPHILS 0 %    METAMYELOCYTES 3 %    MYELOCYTES 2 %    ABS. NEUTROPHILS 6.9 K/UL    ABS. LYMPHOCYTES 2.5 K/UL    ABS. MONOCYTES 0.4 K/UL    ABS. EOSINOPHILS 0.0 K/UL    ABS.  BASOPHILS 0.0 K/UL    RBC COMMENTS NORMOCYTIC, NORMOCHROMIC      WBC COMMENTS TOXIC GRANULATION     MAGNESIUM    Collection Time: 10/06/17  9:05 PM   Result Value Ref Range    Magnesium 2.4 1.6 - 2.4 mg/dL   METABOLIC PANEL, COMPREHENSIVE    Collection Time: 10/06/17  9:05 PM   Result Value Ref Range    Sodium 133 (L) 136 - 145 mmol/L    Potassium 5.4 (H) 3.5 - 5.1 mmol/L    Chloride 99 97 - 108 mmol/L    CO2 28 21 - 32 mmol/L    Anion gap 6 5 - 15 mmol/L    Glucose 342 (H) 65 - 100 mg/dL BUN 18 6 - 20 MG/DL    Creatinine 0.88 0.55 - 1.02 MG/DL    BUN/Creatinine ratio 20 12 - 20      GFR est AA >60 >60 ml/min/1.73m2    GFR est non-AA >60 >60 ml/min/1.73m2    Calcium 8.8 8.5 - 10.1 MG/DL    Bilirubin, total 3.2 (H) 0.2 - 1.0 MG/DL    ALT (SGPT) 292 (H) 12 - 78 U/L    AST (SGOT) 195 (H) 15 - 37 U/L    Alk.  phosphatase 437 (H) 45 - 117 U/L    Protein, total 5.9 (L) 6.4 - 8.2 g/dL    Albumin 2.4 (L) 3.5 - 5.0 g/dL    Globulin 3.5 2.0 - 4.0 g/dL    A-G Ratio 0.7 (L) 1.1 - 2.2     NT-PRO BNP    Collection Time: 10/06/17  9:05 PM   Result Value Ref Range    NT pro-BNP 1014 (H) 0 - 125 PG/ML   NUCLEATED RBC    Collection Time: 10/06/17  9:05 PM   Result Value Ref Range    NRBC 5.5 (H) 0  WBC    ABSOLUTE NRBC 0.57 (H) 0.00 - 0.01 K/uL    WBC CORRECTED FOR NR ADJUSTED FOR NUCLEATED RBC'S     LIPASE    Collection Time: 10/06/17  9:05 PM   Result Value Ref Range    Lipase 778 (H) 73 - 393 U/L

## 2017-10-07 NOTE — ED NOTES
Patient assisted to bedside commode; patient voided at this time; patient given call bell to call out when finished as patient stated she wanted to sit in case she needed to pee again.

## 2017-10-07 NOTE — ED PROVIDER NOTES
[facility] [address]  EMERGENCY DEPARTMENT HISTORY AND PHYSICAL EXAM       Date of Service: 10/6/2017   Patient Name: Mike Baird   YOB: 1960  Medical Record Number: [id number]    History of Presenting Illness     Chief Complaint   Patient presents with    Leg Swelling     bloated        History Provided By:  patient and spouse    Additional History:   Mike Baird is a 61 y.o. female with PMhx significant for COPD, HLD, DM, HTN, endocrine dz, who presents via EMS from home to the ED with cc of painless, constant and now worsening \"bloating\" since she was admitted and discharged from the ICU on 10/2/2017. Per record review, pt was admitted for hyponatremia, hypokalemia, and b/l leg cramping. She denies any worsening SOB at home, changes in medications, use of CPAP, or medication noncompliance. She does not have a home O2 requirement. Social Hx: + Tobacco, + EtOH, - Illicit Drugs    There are no other complaints, changes or physical findings at this time. Primary Care Provider: Jake Agudelo MD   Specialist: none    Past History     Past Medical History:   Past Medical History:   Diagnosis Date    Bronchitis     Diabetes ([facility] [address]     Endocrine disease     Hypertension     Other ill-defined conditions(799.89)     high cholesterol        Past Surgical History:   Past Surgical History:   Procedure Laterality Date    HX HEENT      tonsil    HX HEENT      eye ball decompression    HX ORTHOPAEDIC      carpal tunnel        Family History:   History reviewed. No pertinent family history. Social History:   Social History   Substance Use Topics    Smoking status: Current Every Day Smoker     Packs/day: 1.00     Years: 20.00    Smokeless tobacco: Never Used      Comment: patient states that she quit smoking a couple months ago and now vapes    Alcohol use Yes      Comment: socially        Allergies:    Allergies   Allergen Reactions    Codeine Nausea and Vomiting    Darvocet A500 [Propoxyphene N-Acetaminophen] Nausea and Vomiting         Review of Systems   Review of Systems   Constitutional: Negative for chills and fever. Respiratory: Negative for cough and shortness of breath. Cardiovascular: Negative for chest pain. Gastrointestinal: Positive for abdominal distention. Negative for constipation, diarrhea, nausea and vomiting. Skin:        Positive for \"bloating\"   Neurological: Negative for weakness and numbness. All other systems reviewed and are negative. Physical Exam  Physical Exam   Constitutional: She is oriented to person, place, and time. She appears well-developed and well-nourished. HENT:   Head: Normocephalic and atraumatic. Eyes: EOM are normal.   Scleral icterus b/l   Neck: Normal range of motion. Neck supple. Cardiovascular: Normal rate and regular rhythm. Pulmonary/Chest: Effort normal and breath sounds normal. No respiratory distress. Abdominal: Soft. She exhibits no distension. There is no tenderness. Trace pitting edema of the abd. Musculoskeletal: Normal range of motion. Neurological: She is alert and oriented to person, place, and time. Skin: Skin is warm and dry. Appears jaundiced    Psychiatric: She has a normal mood and affect. Nursing note and vitals reviewed. Medical Decision Making   I am the first provider for this patient. I reviewed the vital signs, available nursing notes, past medical history, past surgical history, family history and social history. Old Medical Records: Old medical records. Nursing notes. Provider Notes:   Pt p/w abd distension, worsening leg swelling. DDx:  acute liver failure, renal failure, heart failure. Unlikely DVT given b/l nature. Pt was recently admitted for similar sxs and electrolyte abnml. Will recheck labs, possibly give Bumex, and will review most recent discharge summary.      Critical Care Time: none    ED Course:  8:42 PM   Initial assessment performed. The patients presenting problems have been discussed, and they are in agreement with the care plan formulated and outlined with them. I have encouraged them to ask questions as they arise throughout their visit. Progress Notes:   10:15 PM   Lengthy discussion with patient at bedside regarding lab results. Discussed significantly worsening LFTs including  T Bili of 3.2, likely has ascites. After reviewing biopsy results, appears that pt has metastatic liver CA from lung CA. Pt will not only need to be admitted d/t malignant ascites but also for hypoxia. Recommended admission. Patient is agreeable with plan. Addressed questions. Consult Note:  10:30 PM  Luna Oates MD spoke with Devonte Lyon MD,  Specialty: hospitalist  Discussed pt's hx, disposition, and available diagnostic and imaging results. Reviewed care plans. Consultant agrees with plans as outlined. Dr. Yunier Gomes accepts the patient for hosptitalization   Written by Renetta Evans ED Scribe, as dictated by Luna Oaets MD.       Diagnostic Study Results     Labs -      Recent Results (from the past 12 hour(s))   CBC WITH AUTOMATED DIFF    Collection Time: 10/06/17  9:05 PM   Result Value Ref Range    WBC 10.3 3.6 - 11.0 K/uL    RBC 3.69 (L) 3.80 - 5.20 M/uL    HGB 10.8 (L) 11.5 - 16.0 g/dL    HCT 31.3 (L) 35.0 - 47.0 %    MCV 84.8 80.0 - 99.0 FL    MCH 29.3 26.0 - 34.0 PG    MCHC 34.5 30.0 - 36.5 g/dL    RDW 14.6 (H) 11.5 - 14.5 %    PLATELET 26 (LL) 581 - 400 K/uL    NEUTROPHILS 48 %    BAND NEUTROPHILS 19 %    LYMPHOCYTES 24 %    MONOCYTES 4 %    EOSINOPHILS 0 %    BASOPHILS 0 %    METAMYELOCYTES 3 %    MYELOCYTES 2 %    ABS. NEUTROPHILS 6.9 K/UL    ABS. LYMPHOCYTES 2.5 K/UL    ABS. MONOCYTES 0.4 K/UL    ABS. EOSINOPHILS 0.0 K/UL    ABS.  BASOPHILS 0.0 K/UL    RBC COMMENTS NORMOCYTIC, NORMOCHROMIC      WBC COMMENTS TOXIC GRANULATION     MAGNESIUM    Collection Time: 10/06/17  9:05 PM   Result Value Ref Range    Magnesium 2.4 1.6 - 2.4 mg/dL   METABOLIC PANEL, COMPREHENSIVE    Collection Time: 10/06/17  9:05 PM   Result Value Ref Range    Sodium 133 (L) 136 - 145 mmol/L    Potassium 5.4 (H) 3.5 - 5.1 mmol/L    Chloride 99 97 - 108 mmol/L    CO2 28 21 - 32 mmol/L    Anion gap 6 5 - 15 mmol/L    Glucose 342 (H) 65 - 100 mg/dL    BUN 18 6 - 20 MG/DL    Creatinine 0.88 0.55 - 1.02 MG/DL    BUN/Creatinine ratio 20 12 - 20      GFR est AA >60 >60 ml/min/1.73m2    GFR est non-AA >60 >60 ml/min/1.73m2    Calcium 8.8 8.5 - 10.1 MG/DL    Bilirubin, total 3.2 (H) 0.2 - 1.0 MG/DL    ALT (SGPT) 292 (H) 12 - 78 U/L    AST (SGOT) 195 (H) 15 - 37 U/L    Alk. phosphatase 437 (H) 45 - 117 U/L    Protein, total 5.9 (L) 6.4 - 8.2 g/dL    Albumin 2.4 (L) 3.5 - 5.0 g/dL    Globulin 3.5 2.0 - 4.0 g/dL    A-G Ratio 0.7 (L) 1.1 - 2.2     NT-PRO BNP    Collection Time: 10/06/17  9:05 PM   Result Value Ref Range    NT pro-BNP 1014 (H) 0 - 125 PG/ML   NUCLEATED RBC    Collection Time: 10/06/17  9:05 PM   Result Value Ref Range    NRBC 5.5 (H) 0  WBC    ABSOLUTE NRBC 0.57 (H) 0.00 - 0.01 K/uL    WBC CORRECTED FOR NR ADJUSTED FOR NUCLEATED RBC'S     LIPASE    Collection Time: 10/06/17  9:05 PM   Result Value Ref Range    Lipase 778 (H) 73 - 393 U/L       Radiologic Studies -  The following have been ordered and reviewed:  US ABD LTD   Final Result   ULTRASOUND OF THE RIGHT UPPER QUADRANT     INDICATION: elevated LFTS     COMPARISON: Abdomen and pelvis CT from September 28, 2017     TECHNIQUE:  Sonography of the right upper quadrant was performed.      FINDINGS:     GALLBLADDER: No gallstones, wall thickening, or pericholecystic fluid. COMMON DUCT: 2 mm in diameter. No visualized calculi. [place]el LIVER: Innumerable hepatic masses are again seen. No intrahepatic biliary  dilatation is shown. MAIN PORTAL VEIN: Patent. Appropriate hepatopetal flow. PANCREAS: Ill-defined hypoechoic mass in the pancreatic head is unchanged. RIGHT KIDNEY: 10.9 cm in length.  No hydronephrosis, shadowing calculus, or  contour-deforming renal mass.        IMPRESSION  IMPRESSION:   Persistent diffuse hepatic metastases. No biliary dilatation. Unchanged  pancreatic head mass. CXR Results  (Last 48 hours)               10/06/17 2057  XR CHEST PORT Final result    Impression:  IMPRESSION:       Left lung base opacities and mediastinal lymphadenopathy. No change since last   month. See CT report from 9/29/2017. Narrative:  EXAM:  XR CHEST PORT       INDICATION:  Hypoxia and bilateral lower extremity swelling       COMPARISON: Chest views on 9/15/2017. CT chest on 9/29/2017. TECHNIQUE: Semiupright portable chest AP view       FINDINGS: Cardiac monitoring wires overlie the thorax. Lung volumes are low. Cardiac size is within normal limits. Para-aortic lymph nodes are unchanged. The   pulmonary vasculature is within normal limits. Left lung base opacities are unchanged. No pneumothorax. Right lung is clear. Bones are unchanged. Vital Signs-Reviewed the patient's vital signs.    Patient Vitals for the past 12 hrs:   Temp Pulse Resp BP SpO2   10/06/17 2318 - (!) 130 20 - 90 %   10/06/17 2317 - - - (!) 160/95 -   10/06/17 2215 - (!) 115 18 (!) 171/97 93 %   10/06/17 2200 - (!) 114 19 (!) 170/92 (!) 87 %   10/06/17 2145 - (!) 111 15 160/82 -   10/06/17 2130 - (!) 104 17 152/87 91 %   10/06/17 2115 - 97 18 152/86 92 %   10/06/17 2100 - (!) 101 17 153/86 92 %   10/06/17 2030 - - - 146/86 93 %   10/06/17 2020 97.7 °F (36.5 °C) (!) 101 20 145/88 (!) 89 %   10/06/17 2019 - - - - (!) 89 %   10/06/17 2018 - - - 145/88 -       Medications Given in the ED:  Medications   bumetanide (BUMEX) injection 2 mg (2 mg IntraVENous Given 10/6/17 2149)   albuterol-ipratropium (DUO-NEB) 2.5 MG-0.5 MG/3 ML (3 mL Nebulization Given 10/6/17 2311)       Pulse Oximetry Analysis - Normal 93% on 3L O2 NC     Cardiac Monitor:   Rate: 117  Rhythm: Sinus Tachycardia        Diagnosis Clinical Impression:   1. Liver metastases ([address]    2. Acute liver failure without hepatic coma    3. Malignant ascites    4. Acute on chronic respiratory failure with hypoxia (HCC)         Plan:  1. Admit to hospitalist.    Admit Note:  10:30 PM  Pt is being admitted by Dr. Mireille Rodriguez, hospitalist. The results of their tests and reason(s) for their admission have been discussed with pt and/or available family. They convey agreement and understanding for the need to be admitted and for admission diagnosis. _______________________________   Attestations: This note is prepared by Xochitl Jones, acting as Scribe for Monisha Valdes MD.       The scribe's documentation has been prepared under my direction and personally reviewed by me in its entirety.  I confirm that the note above accurately reflects all work, treatment, procedures, and medical decision making performed by me.  _______________________________

## 2017-10-07 NOTE — PROGRESS NOTES
Rapid Response Note    Responded to the RR called on the floor after pt had just gone up from ED - Tachyarrhythmia in 200's/m  Pt was sinus tachy in ER likely due to Albuterol neb. HR slowed down with IV lopressor 5 mg x 1 push- EKG revealed AFib RVR  Will start pt on IV Cardizem drip now. Change Nebs to Xopenex to decreased stimulation  Will avoid Full AC for now as pt has metastatic Cancer with mets in liver, adrenals & Lungs-- high risk for bleeding.   Consider Cardiology consult if not converted to NSR by AM.          DO NOT BILL

## 2017-10-07 NOTE — PROGRESS NOTES
PCU SHIFT NOTE    Patient received from ER at [phone] in assigned room. Telephone report given by ED nurse Lashonda Kwon. Report included: SBAR, Kardex, I/O's, MAR, vital signs, recent results, heart rhythm of ST, medications given in ER.     0222: Patient alert and oriented x4. Arrived to unit with 3L O2. Vital signs documented. Physical assessment completed. Patient denies any complaints on admission to floor. States she has abdominal pain with palpation to all four quadrants. Requested to be left alone to be able to sleep. Plan of care given to patient, with patient understanding. Bed alarm in place. Call bell in reach. Patient requested water, which was given. Resting with eyes closed. 0320: Patients HR >200. Patient asymptomatic. Denies any pain, nausea, or SOB. Vital signs documented. Rapid Response called at 0325. Nursing supervisor and ICU charge nurse respond to room. Dr. Rom Hooker called and ordered lopressor 5 mg IV stat and to change albuterol treatments to Xopenex. Lopressor given at 3663. Patients oxygen sats <90%. Patient becomes tachypnic. RT in room. Patient placed on NRB at [phone]. Dr. Rom Hooker notified regarding patients condition and ordered Cardizem gtt 5 mg and titrate up to 15 mg to maintain HR <120. Cardizem gtt started at [address]. Patient assisted to bedpan. Able to urinate with dyspnea on exertion. 5016-4444: Patients HR maintains greater than 140 despite titrating Cardizem gtt up to 15 mg. Oxygen sats <90 on NRB. Patient diaphoretic. Restless. Somnolent. Lungs sounds increasingly wet. Dr. Telly Shaffer called. Ordered Lasix 40 mg IV x1. Stat CXR, aiken insertion, ABG. Lasix given at [address] RT in room to draw ABG. Aiken inserted by [facility] RN. Patient becomes increasingly SOB, tachypneic, and restless. Saturation not at goal above 90%. HR maintaining greater than 140. RR called at 0538.     0538: Rapid Response called. Dr. Rom Hooker at bedside. Orders to transfer patient to CCU.  Patient transferred with all belongings, oxygen, IV equipment, monitor, and nursing supervisor and charge nurse present. TRANSFER - OUT REPORT:    Verbal report given to Marixa HAMMONDS (name) on Tayo Hester  being transferred to ICU (unit) for change in patient condition(SOB, HR increased, oxygen sat decreased )       Report consisted of patients Situation, Background, Assessment and   Recommendations(SBAR). Information from the following report(s) SBAR, Kardex, ED Summary, Intake/Output, MAR, Recent Results, Med Rec Status and Cardiac Rhythm Afib with RVR was reviewed with the receiving nurse. Lines:   Peripheral IV 10/06/17 Left Antecubital (Active)   Site Assessment Clean, dry, & intact 10/7/2017  2:51 AM   Phlebitis Assessment 0 10/7/2017  2:51 AM   Infiltration Assessment 0 10/7/2017  2:51 AM   Dressing Status Clean, dry, & intact 10/7/2017  2:51 AM   Dressing Type Tape;Transparent 10/7/2017  2:51 AM   Hub Color/Line Status Pink;Patent; Flushed 10/7/2017  2:51 AM   Action Taken Blood drawn 10/6/2017  9:16 PM       Peripheral IV 10/06/17 Right Antecubital (Active)        Opportunity for questions and clarification was provided.       Patient transported with:   O2 @ NRB liters  Registered Nurse

## 2017-10-07 NOTE — PROGRESS NOTES
1615: removed 5 gold rings from patient's fingers. Rochelle Gautam RN @ bedside to witness. The 5 rings were placed in a sealed biohazard bag. Rings/bag locked in . 1838: Spoke with niece Emma Malinda and notified her that rings were removed and locked in . Requested that family retrieves them on next visit. Emma Petty verbalized understanding and stated she would notify Michelle Coley as well.

## 2017-10-07 NOTE — PROGRESS NOTES
2679: Right femoral CVC inserted per Dr. Ledy Luna at bedside. 0845: Amio gtt started per protocol at 1mg/min. 1000: Precedex gtt started for restlessness.  On BiPaP

## 2017-10-07 NOTE — CONSULTS
Subjective:                 [address], [address]  [phone]    Date of  Admission: 10/6/2017  8:12 PM     Admission type:Emergency    Mozelle Goldmann is a 61 y.o. female admitted for Acute respiratory failure with hypoxia ([facility] [facility] Metastatic can*. Ms Bronwyn Li has lung ca, is DNI and was in AF with rvr. She was started on amio gtt. I gave her IV dig and was asked to see her. She is on bipap and not responsive to questions. Patient Active Problem List    Diagnosis Date Noted    Chronic obstructive pulmonary disease with acute exacerbation ([facility] [address] 10/07/2017    Hyperkalemia 10/07/2017    Atrial fibrillation with RVR ([address] 10/07/2017    Acute respiratory failure with hypoxia ([address] 10/06/2017    Metastatic cancer ([facility] [address] 09/29/2017    Hyponatremia 09/28/2017    Hypokalemia 09/15/2017    Acute respiratory failure ([address] 12/23/2012    Hypoxia 12/23/2012    SIRS (systemic inflammatory response syndrome) ([address] 12/23/2012    Acute bronchitis 12/23/2012    Diabetes mellitus ([address] 12/23/2012    Hypothyroidism 12/23/2012    HTN (hypertension) 12/23/2012    Smoker 12/23/2012      Rahul Browne MD  Past Medical History:   Diagnosis Date    Bronchitis     Diabetes ([address]     Endocrine disease     Hypertension     Other ill-defined conditions(799.89)     high cholesterol      Past Surgical History:   Procedure Laterality Date    HX HEENT      tonsil    HX HEENT      eye ball decompression    HX ORTHOPAEDIC      carpal tunnel     Allergies   Allergen Reactions    Codeine Nausea and Vomiting    Darvocet A500 [Propoxyphene N-Acetaminophen] Nausea and Vomiting      Social History   Substance Use Topics    Smoking status: Current Every Day Smoker     Packs/day: 1.00     Years: 20.00    Smokeless tobacco: Never Used      Comment: patient states that she quit smoking a couple months ago and now vapes    Alcohol use Yes      Comment: socially     History reviewed.  No pertinent family history. Current Facility-Administered Medications   Medication Dose Route Frequency    fluticasone-vilanterol (BREO ELLIPTA) 100mcg-25mcg/puff  1 Puff Inhalation DAILY    oxyCODONE IR (ROXICODONE) tablet 5 mg  5 mg Oral Q4H PRN    sodium chloride (NS) flush 5-10 mL  5-10 mL IntraVENous Q8H    sodium chloride (NS) flush 5-10 mL  5-10 mL IntraVENous PRN    ondansetron (ZOFRAN) injection 4 mg  4 mg IntraVENous Q4H PRN    naloxone (NARCAN) injection 0.4 mg  0.4 mg IntraVENous PRN    glucose chewable tablet 16 g  4 Tab Oral PRN    dextrose (D50W) injection syrg 12.5-25 g  12.5-25 g IntraVENous PRN    glucagon (GLUCAGEN) injection 1 mg  1 mg IntraMUSCular PRN    levalbuterol (XOPENEX) nebulizer soln 1.25 mg/3 mL  1.25 mg Nebulization Q6H RT    metoprolol (LOPRESSOR) 5 mg/5 mL injection        insulin lispro (HUMALOG) injection   SubCUTAneous Q6H    amiodarone (CORDARONE) 900 mg/250 ml D5W infusion  0.5-1 mg/min IntraVENous TITRATE    dexmedeTOMidine (PRECEDEX) 400 mcg in 0.9% sodium chloride 100 mL infusion  0.2-1.4 mcg/kg/hr IntraVENous TITRATE    insulin NPH (NOVOLIN N, HUMULIN N) injection 5 Units  5 Units SubCUTAneous Q12H    NOREPINephrine (LEVOPHED) 8,000 mcg in dextrose 5% 250 mL infusion  2-30 mcg/min IntraVENous TITRATE    methylPREDNISolone (PF) (SOLU-MEDROL) injection 40 mg  40 mg IntraVENous Q8H    cefepime (MAXIPIME) 2 g in 0.9% sodium chloride (MBP/ADV) 100 mL  2 g IntraVENous Q12H         Review of Symptoms:  uto     Subjective:      Visit Vitals    /80    Pulse (!) 114    Temp (!) 94.4 °F (34.7 °C)    Resp 30    Ht 5' 3\" (1.6 m)    Wt 187 lb 9.8 oz (85.1 kg)    SpO2 91%    BMI 33.23 kg/m2       Physical Exam  Abdomen: soft, non-tender.    Extremities: extremities normal  Heart: irr irr  Lungs: decreased BS  Pulses: 2+ and symmetric    Cardiographics    Telemetry: afib    ECG: afib with rvr, st depressions    Labs:  Recent Labs      10/07/17   0531  10/06/17   2105   WBC 5. 9  10.3   HGB  11.2*  10.8*   HCT  32.3*  31.3*   PLT  31*  26*     Recent Labs      10/07/17   0531  10/06/17   2105   NA  138  133*   K  4.3  5.4*   CL  101  99   CO2  23  28   GLU  339*  342*   BUN  20  18   CREA  0.96  0.88   CA  8.9  8.8   MG  2.3  2.4   PHOS  4.9*   --    ALB  2.1*  2.4*   TBILI  3.5*  3.2*   SGOT  182*  195*   ALT  278*  292*   INR  1.2*   --        Recent Labs      10/07/17   0531   TROIQ  <0.04   CPK  294*   CKMB  2.2         Intake/Output Summary (Last 24 hours) at 10/07/17 1231  Last data filed at 10/07/17 0700   Gross per 24 hour   Intake           142.42 ml   Output             2040 ml   Net         -1897.58 ml         Assessment:     Assessment:       Active Problems:    Acute bronchitis (12/23/2012)      Smoker (12/23/2012)      Metastatic cancer ([facility] (9/29/2017)      Acute respiratory failure with hypoxia (HCC) (10/6/2017)      Chronic obstructive pulmonary disease with acute exacerbation ([facility] [address] (10/7/2017)      Hyperkalemia (10/7/2017)      Atrial fibrillation with RVR ([facility] [address] (10/7/2017)         Plan:     Nini Wilson is critically ill. She has lung ca, is DNI and not surprisingly went into resp distress and AF with rvr. She is now rate controlled on amio gtt. She got one dose of IV digoxin. Will cont supportive care with amio gtt and IV dig as needed. Thank you for allowing us to participate in her care.       Shahla Shin MD, [facility]    10/7/2017

## 2017-10-07 NOTE — ED NOTES
Patient belongings consist of shorts, undergarments, shirt, 2 bags; yellow necklace; 3 sets of earrings; 3 yellow rings with clear stones; one yellow band; one gray band (all jewelry is on patient). Patient is wearing her glasses and dentures.

## 2017-10-07 NOTE — ED NOTES
Patient's O2 level dropping down to 87%; patient put on 3 L NC at this time; patient O2 is now at 91%. MD will order a breathing treatment as patient states she has not had one today.

## 2017-10-07 NOTE — CONSULTS
Oncology Consultation        Patient: Tayo Hester MRN: [id number]  SSN: [id number]    YOB: 1960  Age: 61 y.o. Sex: female      Subjective:      Tayo Hester is a 61 y.o. female who is admitted with respiratory failure and sepsis. CXR shows b/l lung infiltrates. She is on BiPAP. Recent been diagnosed with extensive stage SCLC. Review of Systems:    Could not be obtained because the patient is on BiPAP    Past Medical History:   Diagnosis Date    Bronchitis     Diabetes ([address]     Endocrine disease     Hypertension     Other ill-defined conditions(799.89)     high cholesterol     Past Surgical History:   Procedure Laterality Date    HX HEENT      tonsil    HX HEENT      eye ball decompression    HX ORTHOPAEDIC      carpal tunnel      History reviewed. No pertinent family history. Social History   Substance Use Topics    Smoking status: Current Every Day Smoker     Packs/day: 1.00     Years: 20.00    Smokeless tobacco: Never Used      Comment: patient states that she quit smoking a couple months ago and now vapes    Alcohol use Yes      Comment: socially      Prior to Admission medications    Medication Sig Start Date End Date Taking? Authorizing Provider   magnesium oxide (MAG-OX) 400 mg tablet Take 1 Tab by mouth two (2) times a day for 60 days. 10/2/17 12/1/17  Daisy Allen MD   potassium chloride SR (KLOR-CON 10) 10 mEq tablet Take 3 Tabs by mouth three (3) times daily for 60 days. 10/2/17 12/1/17  Daisy Allen MD   ketoconazole (NIZORAL) 200 mg tablet Take 1 Tab by mouth two (2) times a day for 60 days. 10/2/17 12/1/17  Daisy Allen MD   oxyCODONE IR (ROXICODONE) 5 mg immediate release tablet Take 1 Tab by mouth every four (4) hours as needed for Pain. Max Daily Amount: 30 mg. 10/2/17   Daisy Allen MD   cholecalciferol (VITAMIN D3) 1,000 unit tablet Take 1,000 Units by mouth daily.     Historical Provider   fish oil-omega-3 fatty acids 340-1,000 mg capsule Take 1 Cap by mouth daily. Historical Provider   valsartan (DIOVAN) 320 mg tablet Take 320 mg by mouth daily. Historical Provider   lansoprazole (PREVACID) 15 mg capsule Take 30 mg by mouth Daily (before breakfast). Historical Provider   albuterol (VENTOLIN HFA) 90 mcg/actuation inhaler Take 2 Puffs by inhalation every four (4) hours as needed for Wheezing or Shortness of Breath. Historical Provider   amLODIPine (NORVASC) 5 mg tablet Take 5 mg by mouth daily. Historical Provider   MULTIVIT-MINERALS/FOLIC ACID (WOMEN'S MULTIVITAMIN GUMMIES PO) Take 2 Caps by mouth daily. Historical Provider   levothyroxine (SYNTHROID) 88 mcg tablet Take 88 mcg by mouth Daily (before breakfast). Historical Provider   glimepiride (AMARYL) 1 mg tablet Take 1 Tab by mouth Daily (before breakfast). 9/18/17   Pierre Prince MD   fluticasone-vilanterol (BREO ELLIPTA) 100-25 mcg/dose inhaler Take 1 Puff by inhalation daily. 9/18/17   Pierre Prince MD   albuterol-ipratropium (DUO-NEB) 2.5 mg-0.5 mg/3 ml nebu 3 mL by Nebulization route every four (4) hours as needed.  9/18/17   Danae Brown MD              Allergies   Allergen Reactions    Codeine Nausea and Vomiting    Darvocet A500 [Propoxyphene N-Acetaminophen] Nausea and Vomiting           Objective:     Vitals:    10/07/17 1600 10/07/17 1608 10/07/17 1615 10/07/17 1630   BP:  101/66 (!) 87/61 91/56   Pulse: 87 86 86 85   Resp: 25 (!) 31 (!) 35 (!) 33   Temp:       SpO2: 90% 92% 92% 93%   Weight:       Height:                Physical Exam:    GENERAL: On BiPAP, somnolent  EYE: negative  LYMPHATIC: Cervical, supraclavicular, and axillary nodes normal.   THROAT & NECK: BiPAP on  LUNG: b/l ronchi  HEART: irregularly, irregular   ABDOMEN: soft, non-tender  EXTREMITIES:  no edema  SKIN: Normal.  NEUROLOGIC: negative        CT Results (most recent):    Results from Hospital Encounter encounter on 09/28/17   CT HEAD WO CONT   Narrative EXAM:  CT HEAD WITHOUT CONTRAST  INDICATION: Change in mentation. COMPARISON: None. CONTRAST: None. TECHNIQUE: Unenhanced CT of the head was performed using 5 mm images. Brain and  bone windows were generated. Sagittal and coronal reformations were generated. CT dose reduction was achieved through use of a standardized protocol tailored  for this examination and automatic exposure control for dose modulation. CT dose  reduction was achieved through use of a standardized protocol tailored for this  examination and automatic exposure control for dose modulation. FINDINGS:  The ventricles and sulci are normal in size, shape and configuration and  midline. There is minimal atherosclerotic calcification of the carotid arteries. There is no significant white matter disease. There is no intracranial  hemorrhage. There is no extra-axial collection, mass, mass effect or midline  shift. The basilar cisterns are open. No acute infarct is identified. The bone  windows demonstrate no abnormalities. The visualized portions of the paranasal  sinuses and mastoid air cells are clear. Impression IMPRESSION: No acute intracranial abnormality. Lab Results   Component Value Date/Time    WBC 5.9 10/07/2017 05:31 AM    HGB 11.2 10/07/2017 05:31 AM    HCT 32.3 10/07/2017 05:31 AM    PLATELET 31 36/32/7881 05:31 AM    MCV 85.4 10/07/2017 05:31 AM         Lab Results   Component Value Date/Time    Sodium 138 10/07/2017 05:31 AM    Potassium 4.3 10/07/2017 05:31 AM    Chloride 101 10/07/2017 05:31 AM    CO2 23 10/07/2017 05:31 AM    Anion gap 14 10/07/2017 05:31 AM    Glucose 339 10/07/2017 05:31 AM    BUN 20 10/07/2017 05:31 AM    Creatinine 0.96 10/07/2017 05:31 AM    BUN/Creatinine ratio 21 10/07/2017 05:31 AM    GFR est AA >60 10/07/2017 05:31 AM    GFR est non-AA 60 10/07/2017 05:31 AM    Calcium 8.9 10/07/2017 05:31 AM    Bilirubin, total 3.5 10/07/2017 05:31 AM    AST (SGOT) 182 10/07/2017 05:31 AM    Alk.  phosphatase 424 10/07/2017 05:31 AM Protein, total 5.5 10/07/2017 05:31 AM    Albumin 2.1 10/07/2017 05:31 AM    Globulin 3.4 10/07/2017 05:31 AM    A-G Ratio 0.6 10/07/2017 05:31 AM    ALT (SGPT) 278 10/07/2017 05:31 AM           Assessment:     1. Extensive Stage small cell lung ca    Patient admitted with respiratory failure  ? PNA  Sepsis  On BiPAP    I discussed the diagnosis with the family. This is incurable disease. She is critically ill. If she recovers, I shall treat her with systemic chemotherapy          Plan:       1. Respiratory support  2. Treat sepsis per Critical care/hospitalist  3.  Prognosis is guarded        Signed By: Xiang Cobos MD     October 7, 2017

## 2017-10-07 NOTE — PROGRESS NOTES
0600, TRANSFER - IN REPORT:    Verbal report received from NASRA Ding(name) on Austen Squires  being received from Oncology (unit) for change in patient condition(A Fib ) Respiratory Alkalosis > BiPAP,      Report consisted of patients Situation, Background, Assessment and   Recommendations(SBAR). Information from the following report(s) SBAR, Kardex, ED Summary, Intake/Output, MAR, Accordion, Recent Results, Med Rec Status and Cardiac Rhythm A Fib  was reviewed with the receiving nurse. Opportunity for questions and clarification was provided. Assessment completed upon patients arrival to unit and care assumed. Temp; 94.4 axillary  Neuro; alert, cooperative, severe distress, GCS;  Eye; 4, Verbal; 5, Motor; 6.   Respiratory; Sat 90% on BiPAP 8/6 RR 4, Pt's RR 30-40,   moderate retractions, coarse diminished rhonchi breath sounds Bilaterally,    Cardiac; irregularly irregular rhythm, A Fib with RVR, 165 B/min, NIBP [id number] mmHg,  On Cardizem at 15 mg/hr     GI; NPO,  Abd distended ascites  with hypoactive bowel sound,   Renal; Maldonado cath with low urine out put. > 60 mg Lasix IV once given  Skin; Primary Nurse Madison La and NASRA Amos performed a dual skin assessment on this patient No impairment noted  Lalo score is 13, redness blanchable bilaterally at buttock, small ecchymosis at left buttock, abrasion at left knee, Tattoo both side at upper back and both shins, extremities normal, atraumatic, no cyanosis or edema, no edema,   Endo; AC/HS  Lines; PV X 2, Maldonado. Code; DNI. Patient state that she do not want to be in ventilator. Lab; WBC; 5.9,  HGB; 11.2, platelet; 31 , Na; 363, K; 4.3 , BUN; 20, Crea; 0.96,  DVT; none  Plan; BiPAP management, pain management, ativan 1 mg IV given to make her calm down, Amiodarone  150 mg IV stat given, to follow with MD for infusion? ?,    0746, during hand off, patient BP drop to 74/52 mmHg, Cardizem drip held, Primary Doctor notified, order to give Bolus NS 1 L, CVP insertion, follow with CCU Dr > Dr Julienne Diana updated  Bedside and Verbal shift change report given to [address]  (oncoming nurse) by Bowen Duong RN (offgoing nurse). Report included the following information SBAR, Kardex, ED Summary, Procedure Summary, Intake/Output, MAR, Accordion, Recent Results, Med Rec Status and Cardiac Rhythm A Fib .

## 2017-10-08 NOTE — PROGRESS NOTES
emp; 94.4 axillary > covered with extra Madison, Temp in room adjusted. Neuro; on Precedex 0.6 ivonne/kg/hr, drowsy, cooperative, GCS;  Eye; 4, Verbal; 5, Motor; 6. weakness BLL  Respiratory; Sat 94% on BiPAP 8/6 RR 4, Pt's RR 30s,   moderate retractions, coarse diminished rhonchi breath sounds Bilaterally,   Cardiac; NSR, 80 B/min, NIBP 102/58 mmHg,  On Levophed 18 ivonne/min, Amiodarone 0.5 mg/min     GI; NPO,  Abd distended ascites  with hypoactive bowel sound,   Renal; Maldonado cath with Anuria,   Skin;  redness blanchable bilaterally at buttock, small ecchymosis at left buttock, abrasion at left knee, Tattoo both side at upper back and both shins, extremities edema at both LL  Endo; SSI Q 6 hrs  Lines; right Femoral CVC, Maldonado. Code; DNI. Patient state that she do not want to be in ventilator. Lab; WBC; 5.9,  HGB; 11.2, platelet; 31 , Na; 518, K; 4.3 , BUN; 20, Crea; 0.96,  DVT; none > Platelet 31  Soft Restraints for both arm for pt safety for 24 hours  Plan; BiPAP management, pain & agitation management, titrate Levophed top maintain MAP > 65 mmHg, follow lab tomorrow. 2200, BP 84/56 mmHg > Levophed increased to 20 ivonne/min, to follow up.  2213, /65 mmHg, levophed at 22 ivonne/min. 2358, Monitor showed changed in V lead wave, EKG done, showed decreased in QRS wave, T tent at V4, V5, blood sample drawn for BMP, CBC and sent to lab. hospitalist on call updated, Levophed at 30 ivonne/min, Low BP in 80s, changing in EKG, order to add on Trop I and CK/ CKMB, NS bolus 500 ml, warmer blanket for hypothermia, to follow up.  0043, as NS bolus going, BP improved, QRS Complex increased, Dr morrison in the phone, agreed that hyperkalemia > order to stop Amiodarone gtt, give 1 g calcium gluconate IV once, then to follow up, Complex increased Calcium chloride given instead of calcium gluconate.   0047, Lab called for critical result of k 8.5, Phos > 13.5, Mg 3.2, Bicarb 11, > Dr morrison order Bicab 1 amp IV stat, to start insulin 10 unit with Bicarb 100 mEq/L in D5% at 15 ml/hr, to do Gluco check hourly. 0230, Dr Slime Marmolejo informed that the rhythm back to wide, he agreed to give the calcium gluconate and sodium Bicarb 50 ml IV stat, BP now 80/43 mmHg,  0238, calcium gluconate started, NaHCO3 one ampule given, rhythm back NSr with T tent, /47 mmHg. 0330, 5 rings given to the  witnessed by [facility] RN. ABG showed pH of 7.02 Bicarb 11, Dr morrison updated, discussed the option to do urgent HD, order given to consult Nephrology, this option discussed with family, the want to do HD,  0415, Dr. Mary Jo Barrera at bedside explained the patient medical condition. Patient is now a full DNR and comfort measures only. Current treatment with Levophed, Precedex, Bicarb gtt to remain in place, but no further escalation. 5800, morphine for comfort care given, sodium Bicarbonate 1 ampule given for Widecomplex  QRS as per Dr Breonna Lopez.  0505, Dextrose 50% 25 g given to prevent hypoglycemia,   0610, Family asked to remove the BiPAP, agonal rhythm on the monitor and agonal breathing with no distress, no heart beat could be heard upon auscultation, Family at bedside,  at bedside, Dali notified.    8070, Dr Breonna Lopez announce the time of death, patient's belonging taken by family,

## 2017-10-08 NOTE — PROGRESS NOTES
Dr. Sam Ivy at bedside. Patient is now a full DNR and comfort measures only. Current treatment with Levophed, Precedex, Bicarb gtt to remain in place, but no further escalation.

## 2017-10-08 NOTE — PROGRESS NOTES
Called to pronounce patient. No response to noxious stimuli. No spontaneous breath sounds nor cardiac sounds. Pupils fixed and dilated. TOD: 6:11AM.  Family notified and grieving appropriately.    D/C Summary, death certificate, and death note to be completed by Attending MD.  Osman Mckeon MD

## 2017-10-08 NOTE — PROGRESS NOTES
5 [facility] the [facility]. [address] [facility] Belongings found in patient's closet. She has a bag of cloths and a bag of Rx. Attempted to call the  and family to  the medication bag with no answer. 1030 Second attempt to call family to  belongings not successful. [place] [facility] 1120 [place]ported to [facility] [place]. All belonging sent with patient.

## 2017-10-08 NOTE — PROGRESS NOTES
Spiritual Care Assessment/Progress Notes    Vivian Gasca [id number]  [id number]    1960  61 y.o.  female    Patient Telephone Number: [phone] (home)   Orthodox Affiliation: Cheondoism   Language: English   Extended Emergency Contact Information  Primary Emergency Contact: Keny Gomez  Address: [facility] [place] [address], [address] Phone: [phone]  Mobile Phone: [phone]  Relation: Spouse   Patient Active Problem List    Diagnosis Date Noted    Chronic obstructive pulmonary disease with acute exacerbation ([address] 10/07/2017    Hyperkalemia 10/07/2017    Atrial fibrillation with RVR ([address] 10/07/2017    Small cell carcinoma of right lung ([address] 10/07/2017    Acute respiratory failure with hypoxia ([address] 10/06/2017    Metastatic cancer ([address] 09/29/2017    Hyponatremia 09/28/2017    Hypokalemia 09/15/2017    Acute respiratory failure ([address] 12/23/2012    Hypoxia 12/23/2012    SIRS (systemic inflammatory response syndrome) ([address] 12/23/2012    Acute bronchitis 12/23/2012    Diabetes mellitus ([address] 12/23/2012    Hypothyroidism 12/23/2012    HTN (hypertension) 12/23/2012    Smoker 12/23/2012        Date: 10/8/2017       Level of Orthodox/Spiritual Activity:  []         Involved in jarrod tradition/spiritual practice    []         Not involved in jarrod tradition/spiritual practice  []         Spiritually oriented    []         Claims no spiritual orientation    []         seeking spiritual identity  []         Feels alienated from Restorationist practice/tradition  []         Feels angry about Restorationist practice/tradition  []         Spirituality/Restorationist tradition a resource for coping at this time.   [x]         Not able to assess due to medical condition    Services Provided Today per family:  [x]         crisis intervention    []         reading Scriptures  [x]         spiritual assessment    []         prayer  [x]         empathic listening/emotional support  []         rites and rituals (cite in comments)  [x]         life review     []         Bahai support  []         theological development   []         advocacy  []         ethical dialog     []         blessing  [x]         bereavement support    [x]         support to family  []         anticipatory grief support   []         help with AMD  []         spiritual guidance    []         meditation      Spiritual Care Needs  []         Emotional Support  []         Spiritual/Baptism Care  []         Loss/Adjustment  []         Advocacy/Referral                /Ethics  [x]         No needs expressed at               this time  []         Other: (note in               comments)  Spiritual Care Plan  []         Follow up visits with               pt/family  []         Provide materials  []         Schedule sacraments  []         Contact Community               Clergy  [x]         Follow up as needed  []         Other: (note in               comments)     Comments: Responded to call from nursing staff at time of patient's death. Provided support to patient's , niece, and additional family at bedside. Helped family to process the patient's rapid decline and express grief. Facilitated story telling and legacy work. Patient's niece shared that the group gathered was the patient's main support system who gathered weekly for dinner. Affirmed family's connection and support of one another through multiple recent deaths. Assisted family in identifying next steps. Escorted family out of unit when ready to leave and provided with information on bereavement resources. [facility]IT[facility] [employer]ach[employer].    Paging Service [phone])

## 2017-10-08 NOTE — PROGRESS NOTES
Rapid Response/Critical Care note    Received the call from [facility] Dr Neva Santa regarding the deteriorating condition of the patient in ICU room [id number]. Discussed the case in detail & recent ABG results (showing severe acidosis) , BMP (showing Severe Hyperkalemia, Shock liver, acute Renal failure). Subjective:    Pt is on BIPAP in bed in moderate resp distress on precedex drip for sedation. Pt is DNI at the moment as per her own documented wish on admission in ER with me. Family (including mPOA  at bedside)    Objective:    Vitals: noted for Hypotension requiring Levophed drip, HR increased to 141 bpm - looks like Junctional rhythm /?AFib RVR    Exam:   Gen: moderate resp distress on BIPAP  Cardio: irregular tachycardia noted  Resp: Crackles noted +    Labs & Imaging: noted as above      Assessment & Plan:    1> Multi Organ Failure - Resp (on BIPAP), Cardiovascular (on pressors), Renal failure with severe acidosis & Hyperkalemia, Shock Liver  2> Hyperkalemia/Acute renal failure/paroxsymal Afib RVR    Discussed the current critical situation of the patient with the AnaA  at the bedside in detail- discussed  current plan of care & possible therapeutic option including CVVHD/nephrology consult versus transition to Comfort   Care/full DNR in light of impending Cardiorespiratory arrest.    opted for Starting Comfort measures & agreed to full DNR- witnessed by ICU RN team & pt's sister at  Bedside. Will put DNR in chart   Start IV morphine Q 1 hr prn respiratory distress, cont precedex drip for now   Cont Levophed , IV bicarb drip for now till family gathers in AM for complete withdrawal of care once family ready. Check BMP 1 more time at 7 AM to determine if pt improved versus worsened to decide on complete withdrawal of  supportive care as per the discussion/wishes of the .    DC nephrology consult for CVVHD earlier thought of by Telehospitalist.   Given IV insulin + Dextrose 50%, s/p 3 amp of bicarb IV, Calcium gluconate for Hyperkalemia    3> Incurable metastatic Lung Cancer- poor prognosis as per Primary Oncologist Dr Dharmesh Martinez consult in AM if pt survives the night- unlikely with current acidosis.         Total Direct Critical care time spent: 50 mins

## 2017-10-08 NOTE — PROGRESS NOTES
*MICHELE acknowledged consult*. MICHELE sent referral to [facility], for an info session.     ADELAIDE Lund CM  [phone]

## 2017-10-09 NOTE — ROUTINE PROCESS
Quality: Chart reviewed for death and restraints. Restraints (bilateral soft wrist) noted 10/7/17-10/8/17. Restraints not a contributing factor in patient death. Documented for tracking according to CMS guidelines at 10:15 on 10/9/17.

## 2021-02-06 NOTE — PROGRESS NOTES
[facility]  Pulmonary, Critical Care, and Sleep Medicine    Name: Brad Khan MRN: [id number]   : 1960 Hospital: [facility]   Date: 2017        Critical Care Patient Consult    IMPRESSION:   · Severe metabolic alkalosis, not clear what is the exact etiology, Pt is very alkalotic, ph: 7.65! · Acute hypoxic Respiratory Failure off oxygen  · Probable COPD, Chronic Bronchitis, Smoker. -Suspected mild acute exacerbation. On nebs, oxygen. · Leukocytosis  · Hyponatremia-not sure about her volume status   · Hypokalemia  · Hypochloremia  · Elevated bicarb  · DM has been on metformin. · Anemia  · Constipation  · Decreasing plts  · Oral Thrush  · Peripheral edema  · Smoker 1ppd  · Discussed with Dr. Jayson Mac, Dr. Katherin Rasheed, nurse. RECOMMENDATIONS:   · Renal following  · Pt does not have a neb at home, may need one  · Pt willing to see us as outpt- please make sure she has appt  · Holding metforming  · Smoking cessation counseling done  · Monitor the BP   · Follow electrolytes with repletion. · Transfer to Tele, Day 3 waiting for bed     Subjective/History:     Pt feeling better today. No acute issues. NO cramps of arms or legs. No HA. No chest pain, no shortness of breath. This patient has been seen and evaluated at the request of Dr. Katherin Rasheed for above. Patient is a 61 y.o. female   Who presents with a lot of metabolic derangements. Has actually been pretty asymptomatic. Reports per her PCP about 4 weeks ago her metformin was changed and she had nausea and emesis for about 2 weeks. Then about 2 weeks ago her meds were adjust and she had less nausea and vomiting. Now she has been constipated for the last 3-4 days. Seen in ED 5 days ago with acute COPD exacerbation. Has been on a prednisone taper. No fevers, no chills, No weight loss, no cramping. +Consitpation for 3 days. Has been on a diuretic. NO chest pain, no back pain, no leg pain.    ROS of 10 other systems is negative except as above. ROS is otherwise negative   Past Medical History:   Diagnosis Date    Bronchitis     Diabetes ([address]     Endocrine disease     Hypertension     Other ill-defined conditions     high cholesterol      Past Surgical History:   Procedure Laterality Date    HX HEENT      tonsil    HX HEENT      eye ball decompression    HX ORTHOPAEDIC      carpal tunnel      Prior to Admission medications    Medication Sig Start Date End Date Taking? Authorizing Provider   cetirizine (ZYRTEC) 10 mg tablet Take 1 Tab by mouth daily. 9/10/17   Katherine Segura MD   methylPREDNISolone (MEDROL, NALINI,) 4 mg tablet Take as directed on packaging 9/10/17   Katherine Segura MD   guaiFENesin (ROBITUSSIN) 100 mg/5 mL liquid Take 5 mL by mouth three (3) times daily. 12/24/12   Glenn Mascorro MD   omeprazole (PRILOSEC) 40 mg capsule Take 1 Cap by mouth daily. 12/24/12   Glenn Mascorro MD   ipratropium-albuterol (COMBIVENT)  mcg/actuation inhaler Take 2 Puffs by inhalation every six (6) hours as needed for Wheezing. 12/24/12   Glenn Mascorro MD   predniSONE (DELTASONE) 5 mg tablet Take 1 Tab by mouth daily. Take 8 tab po daily for 3 days then 6 tab po daily for 3 days, then 4 tab po daily for 3 days then 2 tab po daily for 3 days, then 1 tab po daily for 3 days then stop 12/24/12   Glenn Mascorro MD   metFORMIN (GLUCOPHAGE) 500 mg tablet Take 1,000 mg by mouth two (2) times daily (with meals). Ginger Forrester MD   levothyroxine (SYNTHROID) 100 mcg tablet Take 100 mcg by mouth Daily (before breakfast). Ginger Forrester MD   valsartan-hydrochlorothiazide (DIOVAN-HCT) 320-12.5 mg per tablet Take 1 Tab by mouth daily. Ginger Forrester MD   rosuvastatin (CRESTOR) 10 mg tablet Take 10 mg by mouth nightly. Ginger Forrester MD   aspirin 81 mg tablet Take 81 mg by mouth.       Ginger Forrester MD     Current Facility-Administered Medications   Medication Dose Route Frequency    mupirocin (BACTROBAN) 2 % ointment   Both Nostrils BID    aspirin chewable tablet 81 mg  81 mg Oral ACB    guaiFENesin (ROBITUSSIN) 100 mg/5 mL oral liquid 100 mg  100 mg Oral TID    levothyroxine (SYNTHROID) tablet 100 mcg  100 mcg Oral ACB    pantoprazole (PROTONIX) tablet 40 mg  40 mg Oral ACB    atorvastatin (LIPITOR) tablet 20 mg  20 mg Oral QHS    sodium chloride (NS) flush 5-10 mL  5-10 mL IntraVENous Q8H    0.9% sodium chloride with KCl 40 mEq/L infusion   IntraVENous CONTINUOUS    predniSONE (DELTASONE) tablet 10 mg  10 mg Oral DAILY WITH BREAKFAST    guaiFENesin ER (MUCINEX) tablet 600 mg  600 mg Oral BID    insulin lispro (HUMALOG) injection   SubCUTAneous TIDAC    spironolactone (ALDACTONE) tablet 50 mg  50 mg Oral BID    sodium chloride (NS) flush 5-10 mL  5-10 mL IntraVENous Q8H     Allergies   Allergen Reactions    Codeine Nausea and Vomiting    Darvocet A500 [Propoxyphene N-Acetaminophen] Nausea and Vomiting      Social History   Substance Use Topics    Smoking status: Current Every Day Smoker     Packs/day: 1.00    Smokeless tobacco: Never Used    Alcohol use Yes      Comment: socially      History reviewed. No pertinent family history. Review of Systems:  A comprehensive review of systems was negative. except as noted above.      Objective:   Vital Signs:    Visit Vitals    /78 (BP 1 Location: Right arm, BP Patient Position: Sitting)    Pulse 72    Temp 98.7 °F (37.1 °C)    Resp 17    Ht 5' 3\" (1.6 m)    Wt 83.9 kg (184 lb 15.5 oz)    SpO2 95%    Breastfeeding No    BMI 32.77 kg/m2       O2 Device: Room air   O2 Flow Rate (L/min): 2 l/min   Temp (24hrs), Av.3 °F (36.8 °C), Min:97.9 °F (36.6 °C), Max:99 °F (37.2 °C)       Intake/Output:   Last shift:      701 - 1900  In: -   Out: 300 [Urine:300]  Last 3 shifts: 1901 -  0700  In: 5136.7 [P.O.:2290; I.V.:2846.7]  Out: 7215 [Urine:7215]    Intake/Output Summary (Last 24 hours) at 17 0832  Last data filed at 09/18/17 0751   Gross per 24 hour   Intake          2446.67 ml   Output             3975 ml   Net         -1528.33 ml       Physical Exam:    General:  Alert, cooperative, no distress, appears stated age. Lying in bed, appears comfortable. Head:  Normocephalic, without obvious abnormality, atraumatic. Eyes:  Conjunctivae/corneas clear. PERRL, EOMs intact. Nose: Nares normal. Septum midline. Mucosa normal. No drainage or sinus tenderness. Throat: Lips, mucosa, and tongue normal. Teeth and gums normal.   Neck: Supple, symmetrical, trachea midline, no adenopathy, thyroid: no enlargment/tenderness/nodules, no carotid bruit and no JVD. Back:   Symmetric, no curvature. ROM normal.   Lungs:   Pretty clear today. Good air movement. NO wheezing. Chest wall:  No tenderness or deformity. Heart:  Regular rate and rhythm, S1, S2 normal, no murmur, click, rub or gallop. Abdomen:   Soft, non-tender. Bowel sounds normal. No masses,  No organomegaly. Extremities: Extremities normal, atraumatic, no cyanosis, has peripheral edema. Pulses: 2+ and symmetric all extremities.    Skin: Skin color, texture, turgor normal. No rashes or lesions   Lymph nodes: Cervical, supraclavicular, and axillary nodes normal.   Neurologic: Grossly nonfocal       Data:     Recent Results (from the past 24 hour(s))   GLUCOSE, POC    Collection Time: 09/17/17 12:18 PM   Result Value Ref Range    Glucose (POC) 306 (H) 65 - 100 mg/dL    Performed by [facility]    GLUCOSE, POC    Collection Time: 09/17/17  4:41 PM   Result Value Ref Range    Glucose (POC) 215 (H) 65 - 100 mg/dL    Performed by [facility]    METABOLIC PANEL, BASIC    Collection Time: 09/17/17  8:04 PM   Result Value Ref Range    Sodium 134 (L) 136 - 145 mmol/L    Potassium 3.1 (L) 3.5 - 5.1 mmol/L    Chloride 96 (L) 97 - 108 mmol/L    CO2 33 (H) 21 - 32 mmol/L    Anion gap 5 5 - 15 mmol/L    Glucose 182 (H) 65 - 100 mg/dL    BUN 12 6 - 20 MG/DL    Creatinine 0.61 0.55 - 1.02 MG/DL    BUN/Creatinine ratio 20 12 - 20      GFR est AA >60 >60 ml/min/1.73m2    GFR est non-AA >60 >60 ml/min/1.73m2    Calcium 7.9 (L) 8.5 - 55.7 MG/DL   METABOLIC PANEL, COMPREHENSIVE    Collection Time: 09/18/17  4:22 AM   Result Value Ref Range    Sodium 136 136 - 145 mmol/L    Potassium 3.8 3.5 - 5.1 mmol/L    Chloride 100 97 - 108 mmol/L    CO2 28 21 - 32 mmol/L    Anion gap 8 5 - 15 mmol/L    Glucose 165 (H) 65 - 100 mg/dL    BUN 10 6 - 20 MG/DL    Creatinine 0.43 (L) 0.55 - 1.02 MG/DL    BUN/Creatinine ratio 23 (H) 12 - 20      GFR est AA >60 >60 ml/min/1.73m2    GFR est non-AA >60 >60 ml/min/1.73m2    Calcium 8.1 (L) 8.5 - 10.1 MG/DL    Bilirubin, total 0.6 0.2 - 1.0 MG/DL    ALT (SGPT) 55 12 - 78 U/L    AST (SGOT) 37 15 - 37 U/L    Alk.  phosphatase 98 45 - 117 U/L    Protein, total 5.7 (L) 6.4 - 8.2 g/dL    Albumin 2.8 (L) 3.5 - 5.0 g/dL    Globulin 2.9 2.0 - 4.0 g/dL    A-G Ratio 1.0 (L) 1.1 - 2.2     MAGNESIUM    Collection Time: 09/18/17  4:22 AM   Result Value Ref Range    Magnesium 1.8 1.6 - 2.4 mg/dL   PHOSPHORUS    Collection Time: 09/18/17  4:22 AM   Result Value Ref Range    Phosphorus 2.7 2.6 - 4.7 MG/DL   CBC W/O DIFF    Collection Time: 09/18/17  4:22 AM   Result Value Ref Range    WBC 16.9 (H) 3.6 - 11.0 K/uL    RBC 4.25 3.80 - 5.20 M/uL    HGB 12.7 11.5 - 16.0 g/dL    HCT 35.6 35.0 - 47.0 %    MCV 83.8 80.0 - 99.0 FL    MCH 29.9 26.0 - 34.0 PG    MCHC 35.7 30.0 - 36.5 g/dL    RDW 13.6 11.5 - 14.5 %    PLATELET 822 020 - 173 K/uL   GLUCOSE, POC    Collection Time: 09/18/17  7:40 AM   Result Value Ref Range    Glucose (POC) 168 (H) 65 - 100 mg/dL    Performed by Darwin Kiran              Telemetry: EKG: NSR, QTc: 433, LAE,     Imaging:  I have personally reviewed the patients radiographs and have reviewed the reports:  9-10-17: CXR:        Nola Benoit MD Opt out

## 2023-12-05 NOTE — ED NOTES
Patient assisted to bedside commode at this time; urine sample obtained and sent to lab at this time. Patient returned to bed in a position of comfort, call bell within reach; on monitor x3. Vaccines:   1. Flu shot today  2. COVID booster today  3.  Consider RSV vaccine   Labs:  fasting today  Pap:  Pap/ HPV done 12/2021  Mammogram:  Ordered for March  Colonoscopy:  Due 12/2024  DEXA:  Normal 1/2023  Daily aerobic exercise advised.  Three dairy equivalents daily recommended for bone health, if not possible, use calcium supplements to take in a total of 1200 mg calcium daily  Aspirin is indicated for dense coronary calcifications.     Increase calcium to 1000 mg divided doses w/ your current diet.

## 2024-11-11 NOTE — DISCHARGE SUMMARY
Hospitalist Death Summary     Patient ID:  Saba Brewer  [id number]  89 y.o.  1960    PCP on record: Molly Moreno MD    Admit date: 10/6/2017  Discharge date and time: 10/8/2017      DISCHARGE DIAGNOSIS:    Acute hypoxic respiratory failure POA due to acute pulmonary edema  Metastatic small cell carcinoma   Possible left basilar pneumonia   Hypotension/ shock   Afib RVR   Lung cancer with metastasis  Elevated LFT's is due to mts ds    DM type II   HTN  Thrombocytopenia   Hyponatremia, resolved  Hyperkalemia, resolved    Obesity. Body mass index is 33.23 kg/(m^2). Code status: Partial         CONSULTATIONS:  IP CONSULT TO ONCOLOGY  IP CONSULT TO CARDIOLOGY    Excerpted HPI from H&P of Naomy Thomas MD:    HISTORY OF PRESENT ILLNESS:     Saba Brewer is a 61 y.o.  female who presents with above complains from home via EMS. Pt presents with CC of worsening Abdominal bloating/swelling associated with LE edema x 2 days  H/o recently diagnosed Metastatic Cancer with lesions in Liver, Adrenals & Lungs- thought to be primary lung Ca. S/p Liver biopsy on 10/2 after which she was DC on request for pt to travel to [facility] for planned vacation. 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     We were asked to admit for work up and evaluation of the above problems. ______________________________________________________________________  DISCHARGE SUMMARY/HOSPITAL COURSE:  for full details see H&P, daily progress notes, labs, consult notes.      Acute hypoxic respiratory failure POA due to possible COPD exacerbation +/-HF  --pt remain critically ill; no response to aggressive treatment; she continue to deteriorate. 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 Body mass index is 33.23 kg/(m^2).          Code status: Partial   Surrogate Decision Maker:  Keny   Prophylaxis: Lovenox           _______________________________________________________________________  Patient was not seen and examined by me on discharge day.    _______________________________________________________________________  DISCHARGE MEDICATIONS:   Current Discharge Medication List          My Recommended Diet, Activity, Wound Care, and follow-up labs are listed in the patient's Discharge Insturctions which I have personally completed and reviewed.     _______________________________________________________________________  DISPOSITION:              Home with Family:    Home with HH/PT/OT/RN:    SNF/LTC:    [facility]:    OTHER:        Condition at Discharge:  Stable  _______________________________________________________________________  Follow up with:   PCP : Elizabeth Munoz MD  Follow-up Information     None              Total time in minutes spent coordinating this discharge (includes going over instructions, follow-up, prescriptions, and preparing report for sign off to her PCP) :  < 30 minutes    Signed:  Lizett Caro MD 2 seconds or less

## 2025-05-19 NOTE — IP AVS SNAPSHOT
[address] [facility] 
[phone] Patient: Emiliana Londono MRN: [id number] DQR:[birth year] You are allergic to the following Allergen Reactions Codeine Nausea and Vomiting Darvocet A500 (Propoxyphene N-Acetaminophen) Nausea and Vomiting Recent Documentation Height Weight BMI OB Status Smoking Status 1.6 m 83.5 kg 32.59 kg/m2 Postmenopausal Current Every Day Smoker Emergency Contacts Name Discharge Info Relation Home Work Mobile Keny Gomez DISCHARGE CAREGIVER [3] Spouse [3] [phone] About your hospitalization You were admitted on:  September 28, 2017 You last received care in the:  [facility] 2 GENERAL SURGERY You were discharged on:  October 2, 2017 Unit phone number:  [phone] Why you were hospitalized Your primary diagnosis was:  Hypokalemia Your diagnoses also included:  Hyponatremia, Metastatic Cancer (Hcc), Diabetes Mellitus (Hcc), Htn (Hypertension), Hypothyroidism, Smoker Providers Seen During Your Hospitalizations Provider Role Specialty Primary office phone Norberto Gillette MD Attending Provider Emergency Medicine [phone] Carol Ann Palomares MD Attending Provider Internal Medicine [phone] Margarita Maloney MD Attending Provider Hospitalist [phone] Thanh Jackson MD Attending Provider Internal Medicine [phone] Your Primary Care Physician (PCP) Primary Care Physician Office Phone Office Fax [place] [phone] Follow-up Information Follow up With Details Comments Contact Info Cristian Love MD On 10/12/2017 Appt time 3:30 pm     NOTE: We are in [address] [place] [address] [address] [facility] 
[phone] Myrna Rivrea MD In 2 weeks  [address] [facility] [address] [facility] 
[phone] CC:  Lucy Mckee is here today for:   Chief Complaint   Patient presents with    Office Visit     New patient, Lesion of bone of lumbosacral spine            Referring MD: Osmar Wasserman MD  PCP: Ramonita Hernadez APNP   Medications: medications verified, no change  Latex denies known Latex allergy or symptoms of Latex sensitivity.  Allergy to nickel or other metals: NO  Tobacco history: verified  Body mass index is 27.62 kg/m².  Communication Patient would like their results communicated via:    Cell Phone:   Telephone Information:   Mobile [phone]     Okay to leave a message containing results? Yes  Occupation:   Work letter needed?  NO  Refills needed today?  NO   Vi Rubio MD   [address] [facility] 
[phone] Your Appointments Thursday October 12, 2017  3:30 PM EDT New Patient with Dotty Rod MD  
[address] Oncology at [facility]) [address] [facility] [address] [facility]  
[phone] Current Discharge Medication List  
  
START taking these medications Dose & Instructions Dispensing Information Comments Morning Noon Evening Bedtime  
 ketoconazole 200 mg tablet Commonly known as:  NIZORAL Your last dose was: Your next dose is:    
   
   
 Dose:  200 mg Take 1 Tab by mouth two (2) times a day for 60 days. Quantity:  60 Tab Refills:  1  
     
   
   
   
  
 oxyCODONE IR 5 mg immediate release tablet Commonly known as:  Parul Jacobo Your last dose was: Your next dose is:    
   
   
 Dose:  5 mg Take 1 Tab by mouth every four (4) hours as needed for Pain. Max Daily Amount: 30 mg.  
 Quantity:  30 Tab Refills:  0  
     
   
   
   
  
 potassium chloride SR 10 mEq tablet Commonly known as:  KLOR-CON 10 Replaces:  potassium chloride 20 mEq tablet Your last dose was: Your next dose is:    
   
   
 Dose:  30 mEq Take 3 Tabs by mouth three (3) times daily for 60 days. Quantity:  270 Tab Refills:  1 CONTINUE these medications which have CHANGED Dose & Instructions Dispensing Information Comments Morning Noon Evening Bedtime  
 cholecalciferol 1,000 unit tablet Commonly known as:  VITAMIN D3 What changed:  Another medication with the same name was removed. Continue taking this medication, and follow the directions you see here. Your last dose was: Your next dose is:    
   
   
 Dose:  1000 Units Take 1,000 Units by mouth daily. Refills:  0  
     
   
   
   
  
 valsartan 320 mg tablet Commonly known as:  DIOVAN What changed:  Another medication with the same name was removed. Continue taking this medication, and follow the directions you see here. Your last dose was: Your next dose is:    
   
   
 Dose:  320 mg Take 320 mg by mouth daily. Refills:  0 CONTINUE these medications which have NOT CHANGED Dose & Instructions Dispensing Information Comments Morning Noon Evening Bedtime  
 albuterol-ipratropium 2.5 mg-0.5 mg/3 ml Nebu Commonly known as:  Gregorio Valencia Your last dose was: Your next dose is:    
   
   
 Dose:  3 mL  
3 mL by Nebulization route every four (4) hours as needed. Quantity:  100 Nebule Refills:  0  
     
   
   
   
  
 amLODIPine 5 mg tablet Commonly known as:  [place] Your last dose was: Your next dose is:    
   
   
 Dose:  5 mg Take 5 mg by mouth daily. Refills:  0  
     
   
   
   
  
 fish oil-omega-3 fatty acids 340-1,000 mg capsule Your last dose was: Your next dose is:    
   
   
 Dose:  1 Cap Take 1 Cap by mouth daily. Refills:  0  
     
   
   
   
  
 fluticasone-vilanterol 100-25 mcg/dose inhaler Commonly known as:  BREO ELLIPTA Your last dose was: Your next dose is:    
   
   
 Dose:  1 Puff Take 1 Puff by inhalation daily. Quantity:  1 Inhaler Refills:  0  
     
   
   
   
  
 glimepiride 1 mg tablet Commonly known as:  AMARYL Your last dose was: Your next dose is:    
   
   
 Dose:  1 mg Take 1 Tab by mouth Daily (before breakfast). Quantity:  30 Tab Refills:  0  
     
   
   
   
  
 levothyroxine 88 mcg tablet Commonly known as:  SYNTHROID Your last dose was: Your next dose is:    
   
   
 Dose:  88 mcg Take 88 mcg by mouth Daily (before breakfast). Refills:  0  
     
   
   
   
  
 magnesium oxide 400 mg tablet Commonly known as:  MAG-OX Your last dose was: Your next dose is:    
   
   
 Dose:  400 mg Take 1 Tab by mouth two (2) times a day for 60 days. Quantity:  60 Tab Refills:  1 PREVACID 15 mg capsule Generic drug:  lansoprazole Your last dose was: Your next dose is:    
   
   
 Dose:  30 mg Take 30 mg by mouth Daily (before breakfast). Refills:  0 VENTOLIN HFA 90 mcg/actuation inhaler Generic drug:  albuterol Your last dose was: Your next dose is:    
   
   
 Dose:  2 Puff Take 2 Puffs by inhalation every four (4) hours as needed for Wheezing or Shortness of Breath. Refills:  0  
     
   
   
   
  
 WOMEN'S MULTIVITAMIN GUMMIES PO Your last dose was: Your next dose is:    
   
   
 Dose:  2 Cap Take 2 Caps by mouth daily. Refills:  0 STOP taking these medications   
 aspirin 81 mg tablet CRESTOR 10 mg tablet Generic drug:  rosuvastatin  
   
  
 guaiFENesin 100 mg/5 mL liquid Commonly known as:  ROBITUSSIN  
   
  
 metFORMIN 500 mg Tg24 24 hour tablet Commonly known as:  GLUMETZA ER  
   
  
 omeprazole 40 mg capsule Commonly known as:  PRILOSEC  
   
  
 potassium chloride 20 mEq tablet Commonly known as:  K-DUR, KLOR-CON Replaced by:  potassium chloride SR 10 mEq tablet  
   
  
 spironolactone 50 mg tablet Commonly known as:  ALDACTONE Where to Get Your Medications Information on where to get these meds will be given to you by the nurse or doctor. ! Ask your nurse or doctor about these medications  
  ketoconazole 200 mg tablet  
 magnesium oxide 400 mg tablet  
 oxyCODONE IR 5 mg immediate release tablet  
 potassium chloride SR 10 mEq tablet Discharge Instructions [facility] Special Procedures/Radiology Department Radiologist: MD Colt Oreilly Date:October 2, 2017 Liver Biopsy Discharge Instructions You may have an aching pain in the biopsy site tonight. Take Tylenol, as directed on the label, for pain or discomfort. Avoid ibuprofen (Advil, Motrin) and aspirin for the next 48 hours as these drugs may cause you to bleed. Resume your previous diet and follow the medication reconciliation form. Rest today. Do not drive or sign any legal documents activity for 24 hours because you received sedation medications. Avoid any strenuous activity for 24 hours. Do not lift anything heavier than a small grocery bag (10 pounds) and avoid twisting for the next 5 days. If you experience severe sweating, severe abdominal pain, dizziness or faintness, go to the nearest Emergency Room immediately. Pain under the left collar bone is normal. 
 
Watch for signs of infection at biopsy site:  redness, pain, drainage, fever chills. If this occurs, call you doctor. Contact your physician after 5 business days for test results. If you have any questions or concerns, please call [phone] and ask to speak to the nurse on-call. Discharge Orders None [employer] Announcement We are excited to announce that we are making your provider's discharge notes available to you in [employer]uka. You will see these notes when they are completed and signed by the physician that discharged you from your recent hospital stay. If you have any questions or concerns about any information you see in [employer]uka, please call the Health Information Department where you were seen or reach out to your Primary Care Provider for more information about your plan of care. Introducing [facility]! [facility] introduces [employer]uka patient portal. Now you can access parts of your medical record, email your doctor's office, and request medication refills online. 1. In your internet browser, go to [email or web address] 2. Click on the First Time User? Click Here link in the Sign In box.  You will see the New Member Sign Up page. 3. Enter your [email or web address] Access Code exactly as it appears below. You will not need to use this code after youve completed the sign-up process. If you do not sign up before the expiration date, you must request a new code. · [email or web address] Access Code: [email or web address] Expires: 12/9/2017  8:03 PM 
 
4. Enter the last four digits of your Social Security Number (xxxx) and Date of Birth (mm/dd/yyyy) as indicated and click Submit. You will be taken to the next sign-up page. 5. Create a [email or web address] ID. This will be your [email or web address] login ID and cannot be changed, so think of one that is secure and easy to remember. 6. Create a [email or web address] password. You can change your password at any time. 7. Enter your Password Reset Question and Answer. This can be used at a later time if you forget your password. 8. Enter your e-mail address. You will receive e-mail notification when new information is available in [address] 9. Click Sign Up. You can now view and download portions of your medical record. 10. Click the Download Summary menu link to download a portable copy of your medical information. If you have questions, please visit the Frequently Asked Questions section of the [email or web address] website. Remember, [email or web address] is NOT to be used for urgent needs. For medical emergencies, dial 911. Now available from your iPhone and Android! General Information Please provide this summary of care documentation to your next provider. Patient Signature:  ____________________________________________________________ Date:  ____________________________________________________________  
  
[facility] Provider Signature:  ____________________________________________________________ Date:  ____________________________________________________________